# Patient Record
Sex: FEMALE | NOT HISPANIC OR LATINO | URBAN - METROPOLITAN AREA
[De-identification: names, ages, dates, MRNs, and addresses within clinical notes are randomized per-mention and may not be internally consistent; named-entity substitution may affect disease eponyms.]

---

## 2024-11-10 ENCOUNTER — EMERGENCY (EMERGENCY)
Facility: HOSPITAL | Age: 69
LOS: 0 days | Discharge: ANOTHER HOSPITAL | End: 2024-11-10
Attending: STUDENT IN AN ORGANIZED HEALTH CARE EDUCATION/TRAINING PROGRAM
Payer: MEDICARE

## 2024-11-10 ENCOUNTER — INPATIENT (INPATIENT)
Facility: HOSPITAL | Age: 69
LOS: 7 days | Discharge: NURSING, REHAB OR LONG-TERM CARE | DRG: 101 | End: 2024-11-18
Attending: PSYCHIATRY & NEUROLOGY | Admitting: STUDENT IN AN ORGANIZED HEALTH CARE EDUCATION/TRAINING PROGRAM
Payer: SELF-PAY

## 2024-11-10 VITALS
RESPIRATION RATE: 23 BRPM | OXYGEN SATURATION: 100 % | WEIGHT: 205.03 LBS | HEART RATE: 89 BPM | HEIGHT: 66 IN | DIASTOLIC BLOOD PRESSURE: 75 MMHG | SYSTOLIC BLOOD PRESSURE: 142 MMHG

## 2024-11-10 VITALS
SYSTOLIC BLOOD PRESSURE: 124 MMHG | DIASTOLIC BLOOD PRESSURE: 66 MMHG | RESPIRATION RATE: 20 BRPM | OXYGEN SATURATION: 96 % | HEART RATE: 82 BPM

## 2024-11-10 VITALS
OXYGEN SATURATION: 100 % | SYSTOLIC BLOOD PRESSURE: 129 MMHG | HEART RATE: 86 BPM | TEMPERATURE: 99 F | DIASTOLIC BLOOD PRESSURE: 66 MMHG

## 2024-11-10 DIAGNOSIS — R56.9 UNSPECIFIED CONVULSIONS: ICD-10-CM

## 2024-11-10 DIAGNOSIS — D32.9 BENIGN NEOPLASM OF MENINGES, UNSPECIFIED: ICD-10-CM

## 2024-11-10 DIAGNOSIS — E11.9 TYPE 2 DIABETES MELLITUS WITHOUT COMPLICATIONS: ICD-10-CM

## 2024-11-10 LAB
ALBUMIN SERPL ELPH-MCNC: 3.2 G/DL — LOW (ref 3.3–5)
ALBUMIN SERPL ELPH-MCNC: 3.7 G/DL — SIGNIFICANT CHANGE UP (ref 3.3–5)
ALP SERPL-CCNC: 81 U/L — SIGNIFICANT CHANGE UP (ref 40–120)
ALP SERPL-CCNC: 83 U/L — SIGNIFICANT CHANGE UP (ref 40–120)
ALT FLD-CCNC: 32 U/L — SIGNIFICANT CHANGE UP (ref 10–45)
ALT FLD-CCNC: 41 U/L — SIGNIFICANT CHANGE UP (ref 12–78)
AMPHET UR-MCNC: NEGATIVE — SIGNIFICANT CHANGE UP
ANION GAP SERPL CALC-SCNC: 16 MMOL/L — SIGNIFICANT CHANGE UP (ref 5–17)
ANION GAP SERPL CALC-SCNC: 8 MMOL/L — SIGNIFICANT CHANGE UP (ref 5–17)
APAP SERPL-MCNC: <15 UG/ML — SIGNIFICANT CHANGE UP (ref 10–30)
APPEARANCE UR: CLEAR — SIGNIFICANT CHANGE UP
APPEARANCE UR: CLEAR — SIGNIFICANT CHANGE UP
APTT BLD: 23 SEC — LOW (ref 24.5–35.6)
APTT BLD: 32.1 SEC — SIGNIFICANT CHANGE UP (ref 24.5–35.6)
AST SERPL-CCNC: 34 U/L — SIGNIFICANT CHANGE UP (ref 15–37)
AST SERPL-CCNC: 39 U/L — SIGNIFICANT CHANGE UP (ref 10–40)
BACTERIA # UR AUTO: ABNORMAL /HPF
BACTERIA # UR AUTO: ABNORMAL /HPF
BARBITURATES UR SCN-MCNC: NEGATIVE — SIGNIFICANT CHANGE UP
BASE EXCESS BLDA CALC-SCNC: 1 MMOL/L — SIGNIFICANT CHANGE UP (ref -2–3)
BASOPHILS # BLD AUTO: 0.03 K/UL — SIGNIFICANT CHANGE UP (ref 0–0.2)
BASOPHILS # BLD AUTO: 0.04 K/UL — SIGNIFICANT CHANGE UP (ref 0–0.2)
BASOPHILS NFR BLD AUTO: 0.3 % — SIGNIFICANT CHANGE UP (ref 0–2)
BASOPHILS NFR BLD AUTO: 0.3 % — SIGNIFICANT CHANGE UP (ref 0–2)
BENZODIAZ UR-MCNC: POSITIVE
BILIRUB SERPL-MCNC: 0.5 MG/DL — SIGNIFICANT CHANGE UP (ref 0.2–1.2)
BILIRUB SERPL-MCNC: 0.9 MG/DL — SIGNIFICANT CHANGE UP (ref 0.2–1.2)
BILIRUB UR-MCNC: NEGATIVE — SIGNIFICANT CHANGE UP
BILIRUB UR-MCNC: NEGATIVE — SIGNIFICANT CHANGE UP
BLD GP AB SCN SERPL QL: NEGATIVE — SIGNIFICANT CHANGE UP
BLOOD GAS COMMENTS ARTERIAL: SIGNIFICANT CHANGE UP
BUN SERPL-MCNC: 17 MG/DL — SIGNIFICANT CHANGE UP (ref 7–23)
BUN SERPL-MCNC: 19 MG/DL — SIGNIFICANT CHANGE UP (ref 7–23)
CALCIUM SERPL-MCNC: 9 MG/DL — SIGNIFICANT CHANGE UP (ref 8.5–10.1)
CALCIUM SERPL-MCNC: 9.8 MG/DL — SIGNIFICANT CHANGE UP (ref 8.4–10.5)
CAST: 1 /LPF — SIGNIFICANT CHANGE UP (ref 0–4)
CHLORIDE SERPL-SCNC: 101 MMOL/L — SIGNIFICANT CHANGE UP (ref 96–108)
CHLORIDE SERPL-SCNC: 102 MMOL/L — SIGNIFICANT CHANGE UP (ref 96–108)
CO2 BLDA-SCNC: 29 MMOL/L — HIGH (ref 19–24)
CO2 SERPL-SCNC: 21 MMOL/L — LOW (ref 22–31)
CO2 SERPL-SCNC: 26 MMOL/L — SIGNIFICANT CHANGE UP (ref 22–31)
COCAINE METAB.OTHER UR-MCNC: NEGATIVE — SIGNIFICANT CHANGE UP
COLOR SPEC: YELLOW — SIGNIFICANT CHANGE UP
COLOR SPEC: YELLOW — SIGNIFICANT CHANGE UP
CREAT SERPL-MCNC: 0.88 MG/DL — SIGNIFICANT CHANGE UP (ref 0.5–1.3)
CREAT SERPL-MCNC: 1.06 MG/DL — SIGNIFICANT CHANGE UP (ref 0.5–1.3)
DIFF PNL FLD: ABNORMAL
DIFF PNL FLD: ABNORMAL
EGFR: 57 ML/MIN/1.73M2 — LOW
EGFR: 71 ML/MIN/1.73M2 — SIGNIFICANT CHANGE UP
EOSINOPHIL # BLD AUTO: 0 K/UL — SIGNIFICANT CHANGE UP (ref 0–0.5)
EOSINOPHIL # BLD AUTO: 0 K/UL — SIGNIFICANT CHANGE UP (ref 0–0.5)
EOSINOPHIL NFR BLD AUTO: 0 % — SIGNIFICANT CHANGE UP (ref 0–6)
EOSINOPHIL NFR BLD AUTO: 0 % — SIGNIFICANT CHANGE UP (ref 0–6)
EPI CELLS # UR: PRESENT
ETHANOL SERPL-MCNC: <10 MG/DL — SIGNIFICANT CHANGE UP (ref 0–10)
ETHANOL SERPL-MCNC: <10 MG/DL — SIGNIFICANT CHANGE UP (ref 0–10)
GAS PNL BLDA: SIGNIFICANT CHANGE UP
GAS PNL BLDV: SIGNIFICANT CHANGE UP
GLUCOSE BLDC GLUCOMTR-MCNC: 91 MG/DL — SIGNIFICANT CHANGE UP (ref 70–99)
GLUCOSE SERPL-MCNC: 124 MG/DL — HIGH (ref 70–99)
GLUCOSE SERPL-MCNC: 149 MG/DL — HIGH (ref 70–99)
GLUCOSE UR QL: >=1000 MG/DL
GLUCOSE UR QL: >=1000 MG/DL
HCO3 BLDA-SCNC: 27 MMOL/L — SIGNIFICANT CHANGE UP (ref 21–28)
HCT VFR BLD CALC: 36.1 % — SIGNIFICANT CHANGE UP (ref 34.5–45)
HCT VFR BLD CALC: 38.3 % — SIGNIFICANT CHANGE UP (ref 34.5–45)
HGB BLD-MCNC: 11.2 G/DL — LOW (ref 11.5–15.5)
HGB BLD-MCNC: 11.6 G/DL — SIGNIFICANT CHANGE UP (ref 11.5–15.5)
HOROWITZ INDEX BLDA+IHG-RTO: 100 — SIGNIFICANT CHANGE UP
IMM GRANULOCYTES NFR BLD AUTO: 0.3 % — SIGNIFICANT CHANGE UP (ref 0–0.9)
IMM GRANULOCYTES NFR BLD AUTO: 0.6 % — SIGNIFICANT CHANGE UP (ref 0–0.9)
INR BLD: 0.98 RATIO — SIGNIFICANT CHANGE UP (ref 0.85–1.16)
INR BLD: 0.99 RATIO — SIGNIFICANT CHANGE UP (ref 0.85–1.16)
KETONES UR-MCNC: NEGATIVE MG/DL — SIGNIFICANT CHANGE UP
KETONES UR-MCNC: NEGATIVE MG/DL — SIGNIFICANT CHANGE UP
LACTATE SERPL-SCNC: 3.1 MMOL/L — HIGH (ref 0.7–2)
LEUKOCYTE ESTERASE UR-ACNC: NEGATIVE — SIGNIFICANT CHANGE UP
LEUKOCYTE ESTERASE UR-ACNC: NEGATIVE — SIGNIFICANT CHANGE UP
LIDOCAIN IGE QN: 14 U/L — SIGNIFICANT CHANGE UP (ref 7–60)
LYMPHOCYTES # BLD AUTO: 0.59 K/UL — LOW (ref 1–3.3)
LYMPHOCYTES # BLD AUTO: 1.1 K/UL — SIGNIFICANT CHANGE UP (ref 1–3.3)
LYMPHOCYTES # BLD AUTO: 4.9 % — LOW (ref 13–44)
LYMPHOCYTES # BLD AUTO: 9.8 % — LOW (ref 13–44)
MAGNESIUM SERPL-MCNC: 2 MG/DL — SIGNIFICANT CHANGE UP (ref 1.6–2.6)
MCHC RBC-ENTMCNC: 23 PG — LOW (ref 27–34)
MCHC RBC-ENTMCNC: 23.3 PG — LOW (ref 27–34)
MCHC RBC-ENTMCNC: 30.3 G/DL — LOW (ref 32–36)
MCHC RBC-ENTMCNC: 31 G/DL — LOW (ref 32–36)
MCV RBC AUTO: 75.2 FL — LOW (ref 80–100)
MCV RBC AUTO: 75.8 FL — LOW (ref 80–100)
METHADONE UR-MCNC: NEGATIVE — SIGNIFICANT CHANGE UP
MONOCYTES # BLD AUTO: 0.18 K/UL — SIGNIFICANT CHANGE UP (ref 0–0.9)
MONOCYTES # BLD AUTO: 0.57 K/UL — SIGNIFICANT CHANGE UP (ref 0–0.9)
MONOCYTES NFR BLD AUTO: 1.5 % — LOW (ref 2–14)
MONOCYTES NFR BLD AUTO: 5.1 % — SIGNIFICANT CHANGE UP (ref 2–14)
NEUTROPHILS # BLD AUTO: 11.12 K/UL — HIGH (ref 1.8–7.4)
NEUTROPHILS # BLD AUTO: 9.45 K/UL — HIGH (ref 1.8–7.4)
NEUTROPHILS NFR BLD AUTO: 84.5 % — HIGH (ref 43–77)
NEUTROPHILS NFR BLD AUTO: 92.7 % — HIGH (ref 43–77)
NITRITE UR-MCNC: POSITIVE
NITRITE UR-MCNC: POSITIVE
NRBC # BLD: 0 /100 WBCS — SIGNIFICANT CHANGE UP (ref 0–0)
NRBC # BLD: 0 /100 WBCS — SIGNIFICANT CHANGE UP (ref 0–0)
OPIATES UR-MCNC: NEGATIVE — SIGNIFICANT CHANGE UP
OXYCODONE UR-MCNC: NEGATIVE — SIGNIFICANT CHANGE UP
PCO2 BLDA: 48 MMHG — HIGH (ref 32–46)
PCP SPEC-MCNC: SIGNIFICANT CHANGE UP
PCP UR-MCNC: NEGATIVE — SIGNIFICANT CHANGE UP
PH BLDA: 7.36 — SIGNIFICANT CHANGE UP (ref 7.35–7.45)
PH UR: 6 — SIGNIFICANT CHANGE UP (ref 5–8)
PH UR: 6 — SIGNIFICANT CHANGE UP (ref 5–8)
PLATELET # BLD AUTO: 214 K/UL — SIGNIFICANT CHANGE UP (ref 150–400)
PLATELET # BLD AUTO: 316 K/UL — SIGNIFICANT CHANGE UP (ref 150–400)
PO2 BLDA: 300 MMHG — HIGH (ref 83–108)
POTASSIUM SERPL-MCNC: 3.2 MMOL/L — LOW (ref 3.5–5.3)
POTASSIUM SERPL-MCNC: 3.7 MMOL/L — SIGNIFICANT CHANGE UP (ref 3.5–5.3)
POTASSIUM SERPL-SCNC: 3.2 MMOL/L — LOW (ref 3.5–5.3)
POTASSIUM SERPL-SCNC: 3.7 MMOL/L — SIGNIFICANT CHANGE UP (ref 3.5–5.3)
PROT SERPL-MCNC: 7.5 G/DL — SIGNIFICANT CHANGE UP (ref 6–8.3)
PROT SERPL-MCNC: 7.7 GM/DL — SIGNIFICANT CHANGE UP (ref 6–8.3)
PROT UR-MCNC: SIGNIFICANT CHANGE UP MG/DL
PROT UR-MCNC: SIGNIFICANT CHANGE UP MG/DL
PROTHROM AB SERPL-ACNC: 11.1 SEC — SIGNIFICANT CHANGE UP (ref 9.9–13.4)
PROTHROM AB SERPL-ACNC: 11.3 SEC — SIGNIFICANT CHANGE UP (ref 9.9–13.4)
RBC # BLD: 4.8 M/UL — SIGNIFICANT CHANGE UP (ref 3.8–5.2)
RBC # BLD: 5.05 M/UL — SIGNIFICANT CHANGE UP (ref 3.8–5.2)
RBC # FLD: 13.9 % — SIGNIFICANT CHANGE UP (ref 10.3–14.5)
RBC # FLD: 13.9 % — SIGNIFICANT CHANGE UP (ref 10.3–14.5)
RBC CASTS # UR COMP ASSIST: 0 /HPF — SIGNIFICANT CHANGE UP (ref 0–4)
RBC CASTS # UR COMP ASSIST: 3 /HPF — SIGNIFICANT CHANGE UP (ref 0–4)
RH IG SCN BLD-IMP: POSITIVE — SIGNIFICANT CHANGE UP
SALICYLATES SERPL-MCNC: <2 MG/DL — LOW (ref 15–30)
SAO2 % BLDA: 99 % — HIGH (ref 94–98)
SODIUM SERPL-SCNC: 136 MMOL/L — SIGNIFICANT CHANGE UP (ref 135–145)
SODIUM SERPL-SCNC: 138 MMOL/L — SIGNIFICANT CHANGE UP (ref 135–145)
SP GR SPEC: 1.02 — SIGNIFICANT CHANGE UP (ref 1–1.03)
SP GR SPEC: 1.02 — SIGNIFICANT CHANGE UP (ref 1–1.03)
SQUAMOUS # UR AUTO: 1 /HPF — SIGNIFICANT CHANGE UP (ref 0–5)
THC UR QL: NEGATIVE — SIGNIFICANT CHANGE UP
TROPONIN I, HIGH SENSITIVITY RESULT: 37.8 NG/L — SIGNIFICANT CHANGE UP
UROBILINOGEN FLD QL: 0.2 MG/DL — SIGNIFICANT CHANGE UP (ref 0.2–1)
UROBILINOGEN FLD QL: 0.2 MG/DL — SIGNIFICANT CHANGE UP (ref 0.2–1)
WBC # BLD: 11.18 K/UL — HIGH (ref 3.8–10.5)
WBC # BLD: 12 K/UL — HIGH (ref 3.8–10.5)
WBC # FLD AUTO: 11.18 K/UL — HIGH (ref 3.8–10.5)
WBC # FLD AUTO: 12 K/UL — HIGH (ref 3.8–10.5)
WBC UR QL: 1 /HPF — SIGNIFICANT CHANGE UP (ref 0–5)
WBC UR QL: 3 /HPF — SIGNIFICANT CHANGE UP (ref 0–5)

## 2024-11-10 PROCEDURE — 70450 CT HEAD/BRAIN W/O DYE: CPT | Mod: 26,MC

## 2024-11-10 PROCEDURE — 71045 X-RAY EXAM CHEST 1 VIEW: CPT | Mod: 26

## 2024-11-10 PROCEDURE — 71045 X-RAY EXAM CHEST 1 VIEW: CPT | Mod: 26,77,76

## 2024-11-10 PROCEDURE — 99291 CRITICAL CARE FIRST HOUR: CPT | Mod: 25

## 2024-11-10 PROCEDURE — 43752 NASAL/OROGASTRIC W/TUBE PLMT: CPT

## 2024-11-10 PROCEDURE — 95720 EEG PHY/QHP EA INCR W/VEEG: CPT

## 2024-11-10 PROCEDURE — 99291 CRITICAL CARE FIRST HOUR: CPT

## 2024-11-10 PROCEDURE — 99292 CRITICAL CARE ADDL 30 MIN: CPT | Mod: 25

## 2024-11-10 PROCEDURE — 31500 INSERT EMERGENCY AIRWAY: CPT

## 2024-11-10 PROCEDURE — 71045 X-RAY EXAM CHEST 1 VIEW: CPT | Mod: 26,77

## 2024-11-10 RX ORDER — PROPOFOL 10 MG/ML
10 INJECTION, EMULSION INTRAVENOUS
Qty: 1000 | Refills: 0 | Status: DISCONTINUED | OUTPATIENT
Start: 2024-11-10 | End: 2024-11-10

## 2024-11-10 RX ORDER — FENTANYL 12 UG/H
0.5 PATCH, EXTENDED RELEASE TRANSDERMAL
Qty: 2500 | Refills: 0 | Status: DISCONTINUED | OUTPATIENT
Start: 2024-11-10 | End: 2024-11-10

## 2024-11-10 RX ORDER — LORAZEPAM 2 MG/1
2 TABLET ORAL ONCE
Refills: 0 | Status: DISCONTINUED | OUTPATIENT
Start: 2024-11-10 | End: 2024-11-10

## 2024-11-10 RX ORDER — FENTANYL 12 UG/H
25 PATCH, EXTENDED RELEASE TRANSDERMAL
Refills: 0 | Status: DISCONTINUED | OUTPATIENT
Start: 2024-11-10 | End: 2024-11-12

## 2024-11-10 RX ORDER — LEVETIRACETAM 500 MG/1
1000 TABLET, FILM COATED ORAL ONCE
Refills: 0 | Status: COMPLETED | OUTPATIENT
Start: 2024-11-10 | End: 2024-11-10

## 2024-11-10 RX ORDER — CHLORHEXIDINE GLUCONATE 1.2 MG/ML
1 RINSE ORAL DAILY
Refills: 0 | Status: DISCONTINUED | OUTPATIENT
Start: 2024-11-10 | End: 2024-11-11

## 2024-11-10 RX ORDER — FENTANYL 12 UG/H
0.5 PATCH, EXTENDED RELEASE TRANSDERMAL
Qty: 5000 | Refills: 0 | Status: DISCONTINUED | OUTPATIENT
Start: 2024-11-10 | End: 2024-11-10

## 2024-11-10 RX ORDER — CHLORHEXIDINE GLUCONATE 40 MG/ML
15 SOLUTION TOPICAL EVERY 12 HOURS
Refills: 0 | Status: DISCONTINUED | OUTPATIENT
Start: 2024-11-10 | End: 2024-11-10

## 2024-11-10 RX ORDER — LEVETIRACETAM 500 MG/1
3000 TABLET, FILM COATED ORAL ONCE
Refills: 0 | Status: DISCONTINUED | OUTPATIENT
Start: 2024-11-10 | End: 2024-11-10

## 2024-11-10 RX ORDER — LEVETIRACETAM 500 MG/1
1500 TABLET, FILM COATED ORAL ONCE
Refills: 0 | Status: DISCONTINUED | OUTPATIENT
Start: 2024-11-10 | End: 2024-11-10

## 2024-11-10 RX ORDER — PROPOFOL 10 MG/ML
50 INJECTION, EMULSION INTRAVENOUS
Qty: 1000 | Refills: 0 | Status: DISCONTINUED | OUTPATIENT
Start: 2024-11-10 | End: 2024-11-10

## 2024-11-10 RX ORDER — FENTANYL CITRAT/DEXTROSE 5%/PF 1250MCG/50
0.5 PATIENT CONTROLLED ANALGESIA SYRINGE INTRAVENOUS
Qty: 2500 | Refills: 0 | Status: DISCONTINUED | OUTPATIENT
Start: 2024-11-10 | End: 2024-11-10

## 2024-11-10 RX ORDER — POTASSIUM CHLORIDE 600 MG/1
20 TABLET, EXTENDED RELEASE ORAL
Refills: 0 | Status: COMPLETED | OUTPATIENT
Start: 2024-11-10 | End: 2024-11-11

## 2024-11-10 RX ORDER — LEVETIRACETAM 500 MG/1
1500 TABLET, FILM COATED ORAL ONCE
Refills: 0 | Status: COMPLETED | OUTPATIENT
Start: 2024-11-10 | End: 2024-11-10

## 2024-11-10 RX ORDER — LACOSAMIDE 10 MG/ML
400 INJECTION INTRAVENOUS ONCE
Refills: 0 | Status: DISCONTINUED | OUTPATIENT
Start: 2024-11-10 | End: 2024-11-10

## 2024-11-10 RX ORDER — OXYCODONE HYDROCHLORIDE 30 MG/1
10 TABLET ORAL EVERY 6 HOURS
Refills: 0 | Status: DISCONTINUED | OUTPATIENT
Start: 2024-11-10 | End: 2024-11-12

## 2024-11-10 RX ORDER — POTASSIUM CHLORIDE 10 MEQ
10 TABLET, EXTENDED RELEASE ORAL ONCE
Refills: 0 | Status: DISCONTINUED | OUTPATIENT
Start: 2024-11-10 | End: 2024-11-10

## 2024-11-10 RX ORDER — LORAZEPAM 2 MG
2 TABLET ORAL ONCE
Refills: 0 | Status: DISCONTINUED | OUTPATIENT
Start: 2024-11-10 | End: 2024-11-10

## 2024-11-10 RX ORDER — ETOMIDATE 2 MG/ML
20 INJECTION, SOLUTION INTRAVENOUS ONCE
Refills: 0 | Status: COMPLETED | OUTPATIENT
Start: 2024-11-10 | End: 2024-11-10

## 2024-11-10 RX ORDER — 0.9 % SODIUM CHLORIDE 0.9 %
1000 INTRAVENOUS SOLUTION INTRAVENOUS
Refills: 0 | Status: DISCONTINUED | OUTPATIENT
Start: 2024-11-10 | End: 2024-11-10

## 2024-11-10 RX ORDER — SODIUM CHLORIDE 9 MG/ML
1000 INJECTION, SOLUTION INTRAMUSCULAR; INTRAVENOUS; SUBCUTANEOUS
Refills: 0 | Status: DISCONTINUED | OUTPATIENT
Start: 2024-11-10 | End: 2024-11-11

## 2024-11-10 RX ORDER — SENNOSIDES 8.6 MG
2 TABLET ORAL AT BEDTIME
Refills: 0 | Status: DISCONTINUED | OUTPATIENT
Start: 2024-11-10 | End: 2024-11-18

## 2024-11-10 RX ORDER — CEFTRIAXONE SODIUM 1 G
1000 VIAL (EA) INJECTION EVERY 24 HOURS
Refills: 0 | Status: COMPLETED | OUTPATIENT
Start: 2024-11-10 | End: 2024-11-12

## 2024-11-10 RX ORDER — POLYETHYLENE GLYCOL 3350 17 G/17G
17 POWDER, FOR SOLUTION ORAL
Refills: 0 | Status: DISCONTINUED | OUTPATIENT
Start: 2024-11-10 | End: 2024-11-18

## 2024-11-10 RX ORDER — CEFTRIAXONE SODIUM 1 G
1000 VIAL (EA) INJECTION ONCE
Refills: 0 | Status: COMPLETED | OUTPATIENT
Start: 2024-11-10 | End: 2024-11-10

## 2024-11-10 RX ORDER — GLUCAGON INJECTION, SOLUTION 0.5 MG/.1ML
1 INJECTION, SOLUTION SUBCUTANEOUS ONCE
Refills: 0 | Status: DISCONTINUED | OUTPATIENT
Start: 2024-11-10 | End: 2024-11-10

## 2024-11-10 RX ORDER — OXYCODONE HYDROCHLORIDE 30 MG/1
5 TABLET ORAL EVERY 6 HOURS
Refills: 0 | Status: DISCONTINUED | OUTPATIENT
Start: 2024-11-10 | End: 2024-11-12

## 2024-11-10 RX ORDER — LACOSAMIDE 10 MG/ML
200 INJECTION INTRAVENOUS EVERY 12 HOURS
Refills: 0 | Status: DISCONTINUED | OUTPATIENT
Start: 2024-11-10 | End: 2024-11-11

## 2024-11-10 RX ORDER — PANTOPRAZOLE SODIUM 40 MG/1
40 TABLET, DELAYED RELEASE ORAL DAILY
Refills: 0 | Status: DISCONTINUED | OUTPATIENT
Start: 2024-11-10 | End: 2024-11-18

## 2024-11-10 RX ORDER — DEXMEDETOMIDINE HYDROCHLORIDE 4 UG/ML
0.2 INJECTION, SOLUTION INTRAVENOUS
Qty: 200 | Refills: 0 | Status: DISCONTINUED | OUTPATIENT
Start: 2024-11-10 | End: 2024-11-11

## 2024-11-10 RX ORDER — ACETAMINOPHEN 500MG 500 MG/1
650 TABLET, COATED ORAL EVERY 6 HOURS
Refills: 0 | Status: DISCONTINUED | OUTPATIENT
Start: 2024-11-10 | End: 2024-11-18

## 2024-11-10 RX ORDER — ONDANSETRON HYDROCHLORIDE 4 MG/1
4 TABLET, FILM COATED ORAL EVERY 6 HOURS
Refills: 0 | Status: DISCONTINUED | OUTPATIENT
Start: 2024-11-10 | End: 2024-11-18

## 2024-11-10 RX ORDER — SUCCINYLCHOLINE CHLORIDE 20 MG/ML
100 INJECTION INTRAMUSCULAR; INTRAVENOUS ONCE
Refills: 0 | Status: COMPLETED | OUTPATIENT
Start: 2024-11-10 | End: 2024-11-10

## 2024-11-10 RX ORDER — CHLORHEXIDINE GLUCONATE 1.2 MG/ML
15 RINSE ORAL EVERY 12 HOURS
Refills: 0 | Status: DISCONTINUED | OUTPATIENT
Start: 2024-11-10 | End: 2024-11-11

## 2024-11-10 RX ORDER — HEPARIN SODIUM,PORCINE 1000/ML
5000 VIAL (ML) INJECTION EVERY 8 HOURS
Refills: 0 | Status: DISCONTINUED | OUTPATIENT
Start: 2024-11-10 | End: 2024-11-16

## 2024-11-10 RX ORDER — CEFTRIAXONE SODIUM 10 G
1000 VIAL (EA) INJECTION ONCE
Refills: 0 | Status: DISCONTINUED | OUTPATIENT
Start: 2024-11-10 | End: 2024-11-10

## 2024-11-10 RX ADMIN — PROPOFOL 5.58 MICROGRAM(S)/KG/MIN: 10 INJECTION, EMULSION INTRAVENOUS at 10:48

## 2024-11-10 RX ADMIN — SODIUM CHLORIDE 75 MILLILITER(S): 9 INJECTION, SOLUTION INTRAMUSCULAR; INTRAVENOUS; SUBCUTANEOUS at 16:16

## 2024-11-10 RX ADMIN — CHLORHEXIDINE GLUCONATE 1 APPLICATION(S): 1.2 RINSE ORAL at 17:30

## 2024-11-10 RX ADMIN — POTASSIUM CHLORIDE 20 MILLIEQUIVALENT(S): 600 TABLET, EXTENDED RELEASE ORAL at 22:17

## 2024-11-10 RX ADMIN — PROPOFOL 21 MICROGRAM(S)/KG/MIN: 10 INJECTION, EMULSION INTRAVENOUS at 14:29

## 2024-11-10 RX ADMIN — SUCCINYLCHOLINE CHLORIDE 100 MILLIGRAM(S): 20 INJECTION INTRAMUSCULAR; INTRAVENOUS at 10:47

## 2024-11-10 RX ADMIN — PANTOPRAZOLE SODIUM 40 MILLIGRAM(S): 40 TABLET, DELAYED RELEASE ORAL at 17:24

## 2024-11-10 RX ADMIN — Medication 2 TABLET(S): at 22:19

## 2024-11-10 RX ADMIN — PROPOFOL 21 MICROGRAM(S)/KG/MIN: 10 INJECTION, EMULSION INTRAVENOUS at 16:17

## 2024-11-10 RX ADMIN — SODIUM CHLORIDE 75 MILLILITER(S): 9 INJECTION, SOLUTION INTRAMUSCULAR; INTRAVENOUS; SUBCUTANEOUS at 19:09

## 2024-11-10 RX ADMIN — FENTANYL 1.75 MICROGRAM(S)/KG/HR: 12 PATCH, EXTENDED RELEASE TRANSDERMAL at 13:24

## 2024-11-10 RX ADMIN — Medication 100 MILLIGRAM(S): at 13:52

## 2024-11-10 RX ADMIN — LEVETIRACETAM 400 MILLIGRAM(S): 500 TABLET, FILM COATED ORAL at 12:09

## 2024-11-10 RX ADMIN — LEVETIRACETAM 400 MILLIGRAM(S): 500 TABLET, FILM COATED ORAL at 09:58

## 2024-11-10 RX ADMIN — POLYETHYLENE GLYCOL 3350 17 GRAM(S): 17 POWDER, FOR SOLUTION ORAL at 23:06

## 2024-11-10 RX ADMIN — LACOSAMIDE 140 MILLIGRAM(S): 10 INJECTION INTRAVENOUS at 17:31

## 2024-11-10 RX ADMIN — CHLORHEXIDINE GLUCONATE 15 MILLILITER(S): 1.2 RINSE ORAL at 17:27

## 2024-11-10 RX ADMIN — Medication 5000 UNIT(S): at 22:19

## 2024-11-10 RX ADMIN — LACOSAMIDE 180 MILLIGRAM(S): 10 INJECTION INTRAVENOUS at 14:58

## 2024-11-10 RX ADMIN — FENTANYL 3.5 MICROGRAM(S)/KG/HR: 12 PATCH, EXTENDED RELEASE TRANSDERMAL at 14:23

## 2024-11-10 RX ADMIN — DEXMEDETOMIDINE HYDROCHLORIDE 3.5 MICROGRAM(S)/KG/HR: 4 INJECTION, SOLUTION INTRAVENOUS at 19:09

## 2024-11-10 RX ADMIN — ETOMIDATE 20 MILLIGRAM(S): 2 INJECTION, SOLUTION INTRAVENOUS at 10:46

## 2024-11-10 RX ADMIN — Medication 2 MILLIGRAM(S): at 09:55

## 2024-11-10 RX ADMIN — Medication 4.65 MICROGRAM(S)/KG/HR: at 11:45

## 2024-11-10 RX ADMIN — Medication 100 MILLIGRAM(S): at 20:56

## 2024-11-10 RX ADMIN — PROPOFOL 21 MICROGRAM(S)/KG/MIN: 10 INJECTION, EMULSION INTRAVENOUS at 13:21

## 2024-11-10 NOTE — ED ADULT NURSE NOTE - CHIEF COMPLAINT QUOTE
transfer from [facility] for neurosurg pt. had multiple seizures intubated at OSH dx with meningioma

## 2024-11-10 NOTE — ED PROVIDER NOTE - CLINICAL SUMMARY MEDICAL DECISION MAKING FREE TEXT BOX
68 y/o F with PMH DM, glaucoma presenting to the ED w/ seizure.  Vitals stable.  Patient is unresponsive with some seizure activity in the ED (tonic movement of arms and eye deviation).  Patient given ativan IVP, received 15mg versed in the field  Intubated for airway protection; will place on propofol and fentanyl for sedation  Loaded 1g keppra  Will obtain labs and CTH to evaluate for ICH or other acute pathology    Labs reviewed, mild leukocytosis  CT w/ meningioma, may be cause of seizure  Discussed with Neuro ICU, will transfer to [facility] for neurosurgery eval and likely neuro icu admission

## 2024-11-10 NOTE — ED PROVIDER NOTE - PROGRESS NOTE DETAILS
Attending MD Alfonso: Discussed with patient's son at bedside.  He states that he found her on the ground with shaking activity of the entire body.  He estimates that she had continuous shaking activity for about 30 minutes and finally it seemed to young after the paramedics got her to the hospital.  He denies any history of seizure in his mother.  She was not ill with anything yesterday.  She does not drink alcohol.

## 2024-11-10 NOTE — ED ADULT NURSE NOTE - OBJECTIVE STATEMENT
69 yr old female with a h/o high bp and diabetes but normally a and o x 4 fabiola miller was found on the floor by her son having seizures. was taken to [place] and was found to have a mengioma. transferred to us for neuro surg. pt cam intubated from other hospital. unable to assess neuro, skin intact no fevers drake placed at [place]

## 2024-11-10 NOTE — ED ADULT NURSE NOTE - CHIEF COMPLAINT QUOTE
BIBEMS per son he heard pt moaning around 9AM and saw her on the ground in hallway. Per EMS, pt seizing x7 times. EMS placed LAC #20 and gave 5mg Versed IM and 10mg IV push. On NRB 95%. PMH DM, Glaucoma.

## 2024-11-10 NOTE — H&P ADULT - ASSESSMENT
ASSESSMENT/PLAN: 69F presented with Seizure activity     NEURO:  Neuro checks q1/vitals q1  11/10  OSH CTH: 1.0 cm partially calcified mass along the anterior skull base which may represent a meningioma. Mild mass effect on inferior medial frontal lobe  MRI w/wout pending  Vimpat 400mg load, continue Vimpat 200 mg BID  vEEG in progress  Sedation: Propofol  Pain: tylenol and oxycodone PRN   Activity: [] OOB as tolerated [x] Bedrest [] PT [] OT [] PMNR      PULM:  Intubated, SpO2 >92%  16/400/40/5    CV:  -160mmHg  TTE-p    RENAL:  IVF until good PO intake    GI:  Diet: NGT- NPO  senna and miralax for bowel regimen  Last Bowel Movement: PTA  GI prophylaxis [] not indicated [x] PPI [] other:      ENDO:   Goal euglycemia (-180)  ISS  a1c pending    HEME/ONC:  VTE prophylaxis: [x] SCDs [] chemoprophylaxis [] hold chemoprophylaxis due to: [] high risk of DVT/PE on admission due to:  LED-p    ID:  Afebrile    MISC:    SOCIAL/FAMILY:  [x] awaiting [] updated at bedside [] family meeting    CODE STATUS:  [x] Full Code [] DNR [] DNI [] Palliative/Comfort Care    DISPOSITION:  [x] ICU [] Stroke Unit [] Floor [] EMU [] RCU [] PCU    [x] Patient is at high risk of neurologic deterioration/death due to:       Contact: [phone] ASSESSMENT/PLAN: 69F presented with Seizure activity     NEURO:  Neuro checks q1/vitals q1  11/10  OSH CTH: 1.0 cm partially calcified mass along the anterior skull base which may represent a meningioma. Mild mass effect on inferior medial frontal lobe  MRI w/wout pending  Vimpat 400mg load, continue Vimpat 200 mg BID  vEEG in progress  Sedation: Propofol  Pain: tylenol and oxycodone PRN   Activity: [] OOB as tolerated [x] Bedrest [] PT [] OT [] PMNR      PULM:  Intubated, SpO2 >92%  16/400/40/5    CV:  -160mmHg  TTE-p    RENAL:  IVF until good PO intake    GI:  Diet: NGT- NPO  senna and miralax for bowel regimen  Last Bowel Movement: PTA  GI prophylaxis [] not indicated [x] PPI [] other:      ENDO:   Goal euglycemia (-180)  ISS  a1c pending    HEME/ONC:  VTE prophylaxis: [x] SCDs [] chemoprophylaxis [] hold chemoprophylaxis due to: [] high risk of DVT/PE on admission due to:  LED-p    ID:  11/10 at [facility] VS UA (+)  started on Ceftriaxone for 3 days    MISC:    SOCIAL/FAMILY:  [x] awaiting [] updated at bedside [] family meeting    CODE STATUS:  [x] Full Code [] DNR [] DNI [] Palliative/Comfort Care    DISPOSITION:  [x] ICU [] Stroke Unit [] Floor [] EMU [] RCU [] PCU    [x] Patient is at high risk of neurologic deterioration/death due to:       Contact: [phone] ASSESSMENT/PLAN: 69F presented with Seizure activity     NEURO:  Neuro checks q1/vitals q1  11/10  OSH CTH: 1.0 cm partially calcified mass along the anterior skull base which may represent a meningioma. Mild mass effect on inferior medial frontal lobe  MRI w/wout pending  Vimpat 400mg load, continue Vimpat 200 mg BID  vEEG in progress  Sedation: Propofol  Pain: tylenol and oxycodone PRN   Activity: [] OOB as tolerated [x] Bedrest [] PT [] OT [] PMNR      PULM:  Intubated, SpO2 >92%  16/400/30/5    CV:  -160mmHg  TTE-p    RENAL:  IVF until good PO intake    GI:  Diet: NGT- NPO  senna and miralax for bowel regimen  Last Bowel Movement: PTA  GI prophylaxis [] not indicated [x] PPI [] other:      ENDO:   Goal euglycemia (-180)  ISS  a1c pending    HEME/ONC:  VTE prophylaxis: [x] SCDs [x] SQH BID  LED-p    ID:  11/10 at [facility] VS UA (+)  started on Ceftriaxone for 3 days till 11/13  Urine cultures pending    MISC:    SOCIAL/FAMILY:  [x] awaiting [] updated at bedside [] family meeting    CODE STATUS:  [x] Full Code [] DNR [] DNI [] Palliative/Comfort Care    DISPOSITION:  [x] ICU [] Stroke Unit [] Floor [] EMU [] RCU [] PCU    [x] Patient is at high risk of neurologic deterioration/death due to:       Contact: [phone]

## 2024-11-10 NOTE — CONSULT NOTE ADULT - ASSESSMENT
69F PMHx HTN, DM, glaucoma presents as transfer from [facility] due to seizures. Had 30 minutes of consecutive seizure activity, broke after Versed. Received Keppra 2500 mg total, intubated and sedated at time of arrival. Loaded with Vimpat 400 mg in NSCU. On exam, patient is intubated s/p propofol, currently on sedation with precedex. Not arousable, does not follow commands, left gaze preference.   CT head remarkable for 1 cm lesion with mass effect. UA positive for UTI.    Impression: New onset seizures (generalized vs focal to bilateral tonic clonic) with status epilepticus.    Recommendations:  [] Keep off propofol  [] Agree with Vimpat 200 mg BID  [] Continue vEEG monitoring  [] Infectious/metabolic workup as per primary team    Discussed with attending Dr. López. Case and plan not finalized until attending attestation 69F PMHx HTN, DM, glaucoma presents as transfer from [facility] due to seizures. Had 30 minutes of consecutive seizure activity, broke after Versed. Received Keppra 2500 mg total, intubated and sedated at time of arrival. Loaded with Vimpat 400 mg in NSCU. On exam, patient is intubated s/p propofol, currently on sedation with precedex. Not arousable, does not follow commands, left gaze preference.   CT head remarkable for 1 cm lesion with mass effect. UA positive for UTI.    Impression: New onset seizures (generalized vs focal to bilateral tonic clonic) with status epilepticus.    Recommendations:  [] Keep off propofol  [] Agree with Vimpat 200 mg BID  [] Continue vEEG monitoring  [] MRI brain w/ and w/o contrast Epilepsy protocol  [] Infectious/metabolic workup as per primary team  [] Vital signs and neurological checks q2h  [] Seizure, fall and aspiration precautions. Avoid sleep deprivation.   [] Please note that numerous antibiotics may trigger epileptic seizures or status epilepticus by decreasing inhibitory transmission in the brain, thus lowering the seizure threshold. The most potent epileptogenic effect is exerted by penicillins, cephalosporins (fourth generation), fluoroquinolones, and carbapenems.    [] Given concern for seizure, advise the patient with regards to risks and driving privileges associated with the [place] Guidelines - driving is restricted until 12 months (less at discretion of DMV) after the last seizure with impaired awareness. Advise patient regarding the risk of seizures and general seizure safety recommendations including not to be bathing alone, showering with standing water > 2 inches, swimming unsupervised, climbing to high places, or operating heavy machinery until cleared by follow-up outpatient Neurology. Reinforce the importance of compliance with medications. Discuss sleep hygiene and the risks of sleep disruption. Discuss the risk of death associated with seizures / SUDEP.     [] Please note: if patient has a convulsion, please document length of episode, specifically what patient was doing paying attention to eye opening vs closure, gaze deviation, shaking of extremities, tongue bite, urinary incontinence, any derangement of vital signs.      Discussed with attending Dr. López. Case and plan not finalized until attending attestation 69F PMHx HTN, DM, glaucoma presents as transfer from [facility] due to seizures. Had 30 minutes of consecutive seizure activity, broke after Versed. Received Keppra 2500 mg total, intubated and sedated at time of arrival. Loaded with Vimpat 400 mg in NSCU. On exam, patient is intubated s/p propofol, currently on sedation with precedex. Not arousable, does not follow commands, left gaze preference.   CT head remarkable for 1 cm lesion with mass effect. UA positive for UTI.    Impression: New onset seizures (generalized vs focal to bilateral tonic clonic) with status epilepticus.    Recommendations:  [] Keep off propofol  [] Agree with Vimpat 200 mg BID  [] Rescue medications: 1st line- Lorazepam 1 mg IV push for seizures lasting >3 minutes with vital sign changes. 2nd line- Briviact 100 mg IV push for seizures lasting >3 minutes refractory to lorazepam.  [] Continue vEEG monitoring  [] MRI brain w/ and w/o contrast Epilepsy protocol  [] Infectious/metabolic workup as per primary team  [] Vital signs and neurological checks q2h  [] Seizure, fall and aspiration precautions. Avoid sleep deprivation.   [] Please note that numerous antibiotics may trigger epileptic seizures or status epilepticus by decreasing inhibitory transmission in the brain, thus lowering the seizure threshold. The most potent epileptogenic effect is exerted by penicillins, cephalosporins (fourth generation), fluoroquinolones, and carbapenems.    [] Given concern for seizure, advise the patient with regards to risks and driving privileges associated with the [place] Guidelines - driving is restricted until 12 months (less at discretion of DMV) after the last seizure with impaired awareness. Advise patient regarding the risk of seizures and general seizure safety recommendations including not to be bathing alone, showering with standing water > 2 inches, swimming unsupervised, climbing to high places, or operating heavy machinery until cleared by follow-up outpatient Neurology. Reinforce the importance of compliance with medications. Discuss sleep hygiene and the risks of sleep disruption. Discuss the risk of death associated with seizures / SUDEP.     [] Please note: if patient has a convulsion, please document length of episode, specifically what patient was doing paying attention to eye opening vs closure, gaze deviation, shaking of extremities, tongue bite, urinary incontinence, any derangement of vital signs.      Discussed with attending Dr. López. Case and plan not finalized until attending attestation 69F PMHx HTN, DM, glaucoma presents as transfer from [facility] due to seizures. Had 30 minutes of consecutive seizure activity, broke after Versed. Received Keppra 2500 mg total, intubated and sedated at time of arrival. Loaded with Vimpat 400 mg in NSCU. On exam, patient is intubated s/p propofol, currently on sedation with precedex. Not arousable, does not follow commands, left gaze preference.   CT head remarkable for 1 cm lesion with mass effect. UA positive for UTI.    Impression: New onset seizures (generalized vs focal to bilateral tonic clonic) with status epilepticus.    Recommendations:  [] Keep off propofol  [] Agree with Vimpat 200 mg BID  [] Rescue medications: 1st line- Lorazepam 1 mg IV push for seizures lasting >3 minutes with vital sign changes. 2nd line- Briviact 100 mg IV push for seizures lasting >3 minutes refractory to lorazepam.  [] Continue vEEG monitoring  [] MRI brain w/ and w/o contrast Epilepsy protocol  [] Infectious/metabolic workup as per primary team  [] Follow up NSGY recommendations for brain mass  [] Vital signs and neurological checks q2h  [] Seizure, fall and aspiration precautions. Avoid sleep deprivation.   [] Please note that numerous antibiotics may trigger epileptic seizures or status epilepticus by decreasing inhibitory transmission in the brain, thus lowering the seizure threshold. The most potent epileptogenic effect is exerted by penicillins, cephalosporins (fourth generation), fluoroquinolones, and carbapenems.    [] Given concern for seizure, advise the patient with regards to risks and driving privileges associated with the [place] Guidelines - driving is restricted until 12 months (less at discretion of DMV) after the last seizure with impaired awareness. Advise patient regarding the risk of seizures and general seizure safety recommendations including not to be bathing alone, showering with standing water > 2 inches, swimming unsupervised, climbing to high places, or operating heavy machinery until cleared by follow-up outpatient Neurology. Reinforce the importance of compliance with medications. Discuss sleep hygiene and the risks of sleep disruption. Discuss the risk of death associated with seizures / SUDEP.     [] Please note: if patient has a convulsion, please document length of episode, specifically what patient was doing paying attention to eye opening vs closure, gaze deviation, shaking of extremities, tongue bite, urinary incontinence, any derangement of vital signs.      Discussed with attending Dr. López. Case and plan not finalized until attending attestation

## 2024-11-10 NOTE — ED ADULT NURSE REASSESSMENT NOTE - NS ED NURSE REASSESS COMMENT FT1
Late entry 11:24am: Patient observed biting down on tube and extremely restless, Dr. Benites made aware. Fentanyl to be ordered. 3rd IV line initiated.

## 2024-11-10 NOTE — H&P ADULT - NSHPLABSRESULTS_GEN_ALL_CORE
Respiratory:  Mode: AC/ CMV (Assist Control/ Continuous Mandatory Ventilation)  RR (machine): 16  TV (machine): 400  FiO2: 40  PEEP: 5  ITime: 1  MAP: 7    CTof the Head at [facility] VS: 11/10  1.0 cm partially calcified mass along the anterior skull base which may represent a meningioma. Mild mass effect on inferior medial frontal lobe Respiratory:  Mode: AC/ CMV (Assist Control/ Continuous Mandatory Ventilation)  RR (machine): 16  TV (machine): 400  FiO2: 30  PEEP: 5  ITime: 1  MAP: 7    CTof the Head at [facility] VS: 11/10  1.0 cm partially calcified mass along the anterior skull base which may represent a meningioma. Mild mass effect on inferior medial frontal lobe

## 2024-11-10 NOTE — H&P ADULT - NSHPREVIEWOFSYSTEMS_GEN_ALL_CORE
REVIEW OF SYSTEMS: [ ] Unable to Assess due to neurologic exam   [ ] All ROS addressed below are non-contributory, except:  Neuro: [ ] Headache [ ] Back pain [ ] Numbness [ ] Weakness [ ] Ataxia [ ] Dizziness [ ] Aphasia [ ] Dysarthria [ ] Visual disturbance  Resp: [ ] Shortness of breath/dyspnea, [ ] Orthopnea [ ] Cough  CV: [ ] Chest pain [ ] Palpitation [ ] Lightheadedness [ ] Syncope  Renal: [ ] Thirst [ ] Edema  GI: [ ] Nausea [ ] Emesis [ ] Abdominal pain [ ] Constipation [ ] Diarrhea  Hem: [ ] Hematemesis [ ] bright red blood per rectum  ID: [ ] Fever [ ] Chills [ ] Dysuria  ENT: [ ] Rhinorrhea

## 2024-11-10 NOTE — H&P ADULT - HISTORY OF PRESENT ILLNESS
69F with PMH diabetes and glaucoma. Presented to [facility] 11/10 after son found her on the ground this morning presumed seizure-like activity. Patient received a total of 15 mg of Versed during transport from first ED, Patient was intubated in the ED, started on propofol and fentanyl infusions.  Patient received a total of 2500 of IV Keppra at outside hospital.  Patient had a CT of the head that showed "1 cm partially calcified mass along the anterior skull base which may represent a meningioma.  There is mild mass effect on the inferior medial left frontal lobe.  No acute intracranial hemorrhage or midline shift."  Remainder of history is unobtainable as patient is intubated and sedated. Patient transferred to [facility] for further interventions.    Admission Scores  GCS:  Sedation on                               69F with PMH diabetes and glaucoma. Presented to [facility] 11/10 after son found her on the ground this morning presumed seizure-like activity. Patient received a total of 15 mg of Versed during transport from first ED, Patient was intubated in the ED, started on propofol and fentanyl infusions.  Patient received a total of 2500 of IV Keppra at outside hospital.  Patient had a CT of the head that showed "1 cm partially calcified mass along the anterior skull base which may represent a meningioma.  There is mild mass effect on the inferior medial left frontal lobe.  No acute intracranial hemorrhage or midline shift."  Remainder of history is unobtainable as patient is intubated and sedated. Patient transferred to [facility] for further interventions.    Admission Scores  GCS:  Sedation on 69F with PMH diabetes and glaucoma. Presented to [facility] 11/10 after son found her on the ground this morning presumed seizure-like activity. Patient received a total of 15 mg of Versed during transport from first ED, Patient was intubated in the ED, started on propofol and fentanyl infusions.  Patient received a total of 2500 of IV Keppra at outside hospital.  Patient had a CT of the head that showed "1 cm partially calcified mass along the anterior skull base which may represent a meningioma.  There is mild mass effect on the inferior medial left frontal lobe.  No acute intracranial hemorrhage or midline shift."  Remainder of history is unobtainable as patient is intubated and sedated. Patient transferred to [facility] for further interventions.    Admission Scores  GCS:  Sedation

## 2024-11-10 NOTE — ED ADULT TRIAGE NOTE - CHIEF COMPLAINT QUOTE
transfer from [facility] for neurosurg pt. had multiple seizures intubated at OSH dx with meningioma

## 2024-11-10 NOTE — ED ADULT NURSE NOTE - OBJECTIVE STATEMENT
69 year old female BIBA presents to ED postictal after several witnessed seizures. As per family patient had a recent fall and no history of seizures. EMS administered 15mg of Versed. Patient intubated 69 year old female BIBA hx DM, Glaucoma presents to ED postictal after several seizures witnessed by family and EMS. As per family patient had a recent fall and no history of seizures. EMS administered 15mg of Versed. Patient intubated 7.5 and 23 at the lip at 10:06 and given keppra. Land catheter in 14F. CT shows mangioma to be transferred to high level of care.

## 2024-11-10 NOTE — H&P ADULT - BIRTH SEX
Generated in [employer] and routed to Dr Rodriguez for review and signature.  Original placed in MA Done folder on [employer].    Christiana Ellis CMA(AAMA)     Female

## 2024-11-10 NOTE — H&P ADULT - NSHPPHYSICALEXAM_GEN_ALL_CORE
DEVICES:   [ x] Restraints [x ] ET tube [ x] NGT/OGT     EXAMINATION:  PHYSICAL EXAM:    Constitutional: No Acute Distress     Neurological:                                                  Pulmonary: Clear to Auscultation, No rales, No rhonchi, No wheezes     Cardiovascular: S1, S2, Regular rate and rhythm     Gastrointestinal: Soft, Non-tender, Non-distended     Extremities: No calf tenderness DEVICES:   [ x] Restraints [x ] ET tube [ x] NGT/OGT     EXAMINATION:  PHYSICAL EXAM:    Constitutional: No Acute Distress     Neurological: Intubated, no EO, PERRL, (+) cough/gag/overbreathing, not Following, BUE trace localized, BLE WDs                                                 Pulmonary: Clear to Auscultation, No rales, No rhonchi, No wheezes     Cardiovascular: S1, S2, Regular rate and rhythm     Gastrointestinal: Soft, Non-tender, Non-distended     Extremities: No calf tenderness

## 2024-11-10 NOTE — CONSULT NOTE ADULT - SUBJECTIVE AND OBJECTIVE BOX
Neurology - Consult Note    -  Spectra: [phone] ([facility]), [phone] ([facility])  -    HPI: Patient HIEU HERRMANN is a 69y (1955) wo/man with a PMHx significant for ***      Review of Systems:  INCOMPLETE   CONSTITUTIONAL: No fevers or chills  EYES AND ENT: No visual changes or no throat pain   NECK: No pain or stiffness  RESPIRATORY: No hemoptysis or shortness of breath  CARDIOVASCULAR: No chest pain or palpitations  GASTROINTESTINAL: No melena or hematochezia  GENITOURINARY: No dysuria or hematuria  NEUROLOGICAL: +As stated in HPI above  SKIN: No itching, burning, rashes, or lesions   All other review of systems is negative unless indicated above.    Allergies:  Allergy Status Unknown      PMHx/PSHx/Family Hx: As above, otherwise see below   No pertinent past medical history    Diabetes mellitus    Glaucoma        Social Hx:  No current use of tobacco, alcohol, or illicit drugs  Lives with ***    Medications:  MEDICATIONS  (STANDING):  cefTRIAXone   IVPB 1000 milliGRAM(s) IV Intermittent every 24 hours  chlorhexidine 0.12% Liquid 15 milliLiter(s) Oral Mucosa every 12 hours  chlorhexidine 4% Liquid 1 Application(s) Topical daily  heparin   Injectable 5000 Unit(s) SubCutaneous every 8 hours  insulin lispro (ADMELOG) corrective regimen sliding scale   SubCutaneous every 6 hours  lacosamide IVPB 200 milliGRAM(s) IV Intermittent every 12 hours  pantoprazole  Injectable 40 milliGRAM(s) IV Push daily  polyethylene glycol 3350 17 Gram(s) Oral two times a day  propofol Infusion 50 MICROgram(s)/kG/Min (21 mL/Hr) IV Continuous <Continuous>  senna 2 Tablet(s) Oral at bedtime  sodium chloride 0.9%. 1000 milliLiter(s) (75 mL/Hr) IV Continuous <Continuous>    MEDICATIONS  (PRN):  acetaminophen     Tablet .. 650 milliGRAM(s) Oral every 6 hours PRN Temp greater or equal to 38.5C (101.3F), Mild Pain (1 - 3)  ondansetron Injectable 4 milliGRAM(s) IV Push every 6 hours PRN Nausea and/or Vomiting  oxyCODONE    IR 5 milliGRAM(s) Oral every 6 hours PRN Moderate Pain (4 - 6)  oxyCODONE    IR 10 milliGRAM(s) Oral every 6 hours PRN Severe Pain (7 - 10)      Vitals:  T(C): 37.7 (11-10-24 @ 14:30), Max: 37.7 (11-10-24 @ 14:30)  HR: 78 (11-10-24 @ 16:00) (78 - 86)  BP: 122/60 (11-10-24 @ 16:00) (119/57 - 151/67)  RR: 17 (11-10-24 @ 16:00) (17 - 19)  SpO2: 100% (11-10-24 @ 16:00) (100% - 100%)    Physical Examination: INCOMPLETE  General - NAD, pleasant, cooperative   Cardiovascular - Peripheral pulses palpable, no edema  Neurologic Exam:  Mental status - Awake, Alert, Oriented to person, place, and time. Speech fluent, repetition and naming intact. Follows simple and complex commands. Attention/concentration, recent and remote memory (including registration and recall), and fund of knowledge intact    Cranial nerves:  CN II: Visual fields are full to confrontation. Fundoscopic exam is normal with sharp discs. Pupils are 4 mm and briskly reactive to light. Visual acuity is 20/20 bilaterally.  CN III, IV, VI: EOMI, no nystagmus, no ptosis  CN V: Facial sensation is intact to pinprick in all 3 divisions bilaterally.  CN VII: Face is symmetric with normal eye closure and smile.  CN VII: Hearing is normal to rubbing fingers  CN IX, X: Palate elevates symmetrically. Phonation is normal.  CN XI: Head turning and shoulder shrug are intact  CN XII: Tongue is midline with normal movements and no atrophy.    Motor - Normal bulk and tone throughout. No pronator drift of out-stretched arms.  Strength testing            Deltoid      Biceps      Triceps     Wrist Extension    Wrist Flexion     Interossei         R            5                 5               5                     5                              5                        5                 5  L             5                 5               5                     5                              5                        5                 5              Hip Flexion    Hip Extension    Knee Flexion    Knee Extension    Dorsiflexion    Plantar Flexion  R              5                           5                       5                           5                            5                          5  L              5                           5                        5                           5                            5                          5    Sensation - Light touch/temperature OR pain/vibration intact in fingers and toes     DTR's -             Biceps      Triceps     Brachioradialis      Patellar    Ankle    Toes/plantar response  R             2+             2+                  2+                       2+            2+                 Down  L              2+             2+                 2+                        2+           2+                 Down    Coordination - Rapid alternating movements and fine finger movements are intact. There is no dysmetria on finger-to-nose and heel-knee-shin. There are no abnormal or extraneous movements. Romberg?    Gait and station - Posture is normal. Gait is steady with normal steps, base, arm swing, and turning. Heel and toe walking are normal. Tandem gait is normal     Labs:                        11.6   11.18 )-----------( 214      ( 10 Nov 2024 13:23 )             38.3     11-10    138  |  101  |  17  ----------------------------<  124[H]  3.7   |  21[L]  |  0.88    Ca    9.8      10 Nov 2024 13:23    TPro  7.5  /  Alb  3.7  /  TBili  0.9  /  DBili  x   /  AST  39  /  ALT  32  /  AlkPhos  83  11-10    CAPILLARY BLOOD GLUCOSE      POCT Blood Glucose.: 115 mg/dL (10 Nov 2024 13:06)    LIVER FUNCTIONS - ( 10 Nov 2024 13:23 )  Alb: 3.7 g/dL / Pro: 7.5 g/dL / ALK PHOS: 83 U/L / ALT: 32 U/L / AST: 39 U/L / GGT: x             PT/INR - ( 10 Nov 2024 13:23 )   PT: 11.3 sec;   INR: 0.99 ratio         PTT - ( 10 Nov 2024 13:23 )  PTT:23.0 sec  CSF:                  Radiology:     Neurology - Consult Note    -  Spectra: [phone] ([facility]), [phone] ([facility])  -    HPI: Patient HIEU HERRMANN is a 69y (1955) woman with a PMHx significant for HTN, DM, glaucoma who presented to the ED as hospital transfer for seizures. Patient initially presented to [facility] earlier today after she was found by her son with seizure activity. Patient had reported 7 consecutive episodes of full body shaking without return to baseline, lasting approximately 30 minutes prior to EMS arrival. Patient received Ativan, Versed in the field. When she arrived in [facility], she was noted to be unresponsive with tonic movement of bilateral arms and eye deviation (unclear which side). She was intubated for airway protection, started on fentanyl and propofol and loaded with Keppra 1g followed by 1500 mg. CT head revealed 1 cm mass along the anterior skull base with mild mass effect. Patient was transferred to [facility] for further management. Upon arrival to [facility], patient was intubated and sedated, admitted to NSCU. In NSCU, patient loaded with Vimpat 400 mg, started maintenance dose Vimpat 200 mg BID. Currently undergoing vEEG evaluation.    Review of Systems:  Unable to obtain, patient intubated and sedated    Allergies:  Allergy Status Unknown      PMHx/PSHx/Family Hx: As above, otherwise see below   No pertinent past medical history    Diabetes mellitus    Glaucoma        Social Hx:  Unable to obtain, patient unable to provide history    Medications:  MEDICATIONS  (STANDING):  cefTRIAXone   IVPB 1000 milliGRAM(s) IV Intermittent every 24 hours  chlorhexidine 0.12% Liquid 15 milliLiter(s) Oral Mucosa every 12 hours  chlorhexidine 4% Liquid 1 Application(s) Topical daily  heparin   Injectable 5000 Unit(s) SubCutaneous every 8 hours  insulin lispro (ADMELOG) corrective regimen sliding scale   SubCutaneous every 6 hours  lacosamide IVPB 200 milliGRAM(s) IV Intermittent every 12 hours  pantoprazole  Injectable 40 milliGRAM(s) IV Push daily  polyethylene glycol 3350 17 Gram(s) Oral two times a day  propofol Infusion 50 MICROgram(s)/kG/Min (21 mL/Hr) IV Continuous <Continuous>  senna 2 Tablet(s) Oral at bedtime  sodium chloride 0.9%. 1000 milliLiter(s) (75 mL/Hr) IV Continuous <Continuous>    MEDICATIONS  (PRN):  acetaminophen     Tablet .. 650 milliGRAM(s) Oral every 6 hours PRN Temp greater or equal to 38.5C (101.3F), Mild Pain (1 - 3)  ondansetron Injectable 4 milliGRAM(s) IV Push every 6 hours PRN Nausea and/or Vomiting  oxyCODONE    IR 5 milliGRAM(s) Oral every 6 hours PRN Moderate Pain (4 - 6)  oxyCODONE    IR 10 milliGRAM(s) Oral every 6 hours PRN Severe Pain (7 - 10)      Vitals:  T(C): 37.7 (11-10-24 @ 14:30), Max: 37.7 (11-10-24 @ 14:30)  HR: 78 (11-10-24 @ 16:00) (78 - 86)  BP: 122/60 (11-10-24 @ 16:00) (119/57 - 151/67)  RR: 17 (11-10-24 @ 16:00) (17 - 19)  SpO2: 100% (11-10-24 @ 16:00) (100% - 100%)    Physical Examination:   General - Intubated, sedated    Neurologic Exam:  Mental status - Obtunded, mild grimace to noxious stimuli, does not open eyes or follow commands  Cranial nerves: Pupils symmetric, L gaze preference with some roving movements. +corneal +cough/gag  Motor - Moving all extremities spontaneously  Sensation - Minimal movement to noxious stimuli in all extremities  DTR's - 1+ throughout, plantars downgoing  Coordination - Unable to assess, patient intubated/sedated  Gait and station - Unable to assess, patient intubated/sedated    Labs:                        11.6   11.18 )-----------( 214      ( 10 Nov 2024 13:23 )             38.3     11-10    138  |  101  |  17  ----------------------------<  124[H]  3.7   |  21[L]  |  0.88    Ca    9.8      10 Nov 2024 13:23    TPro  7.5  /  Alb  3.7  /  TBili  0.9  /  DBili  x   /  AST  39  /  ALT  32  /  AlkPhos  83  11-10    CAPILLARY BLOOD GLUCOSE      POCT Blood Glucose.: 115 mg/dL (10 Nov 2024 13:06)    LIVER FUNCTIONS - ( 10 Nov 2024 13:23 )  Alb: 3.7 g/dL / Pro: 7.5 g/dL / ALK PHOS: 83 U/L / ALT: 32 U/L / AST: 39 U/L / GGT: x             PT/INR - ( 10 Nov 2024 13:23 )   PT: 11.3 sec;   INR: 0.99 ratio         PTT - ( 10 Nov 2024 13:23 )  PTT:23.0 sec      Radiology:    CT head noncontrast 11/10:  IMPRESSION:  There is a 1.0 cm partially calcified mass along the anterior skull base which may represent a meningioma. There is mild mass effect on the inferior medial left frontal lobe.  No acute intracranial hemorrhage, or midline shift.  Consider follow-up imaging with contrast-enhanced MRI of the brain if no contraindications.

## 2024-11-10 NOTE — PROGRESS NOTE ADULT - SUBJECTIVE AND OBJECTIVE BOX
NSCU ATTENDING -- ADDITIONAL PROGRESS NOTE    Nighttime rounds were performed -- please refer to earlier Progress Note for HPI details.    T(C): 37.9 (11-10-24 @ 19:00), Max: 37.9 (11-10-24 @ 19:00)  HR: 73 (11-10-24 @ 19:00) (73 - 90)  BP: 104/56 (11-10-24 @ 19:00) (104/56 - 160/75)  RR: 17 (11-10-24 @ 19:00) (17 - 21)  SpO2: 100% (11-10-24 @ 19:00) (100% - 100%)  Wt(kg): --    Relevant labwork and imaging reviewed.    CBC:            11.6   11.18 )-----------( 214      ( 11-10-24 @ 13:23 )             38.3         Chem:         ( 11-10-24 @ 13:23 )    138  |  101  |  17  ----------------------------<  124[H]  3.7   |  21[L]  |  0.88        Liver Functions: ( 11-10-24 @ 13:23 )  Alb: 3.7 g/dL / Pro: 7.5 g/dL / ALK PHOS: 83 U/L / ALT: 32 U/L / AST: 39 U/L / GGT: x            MEDICATIONS  (STANDING):  cefTRIAXone   IVPB 1000 milliGRAM(s) IV Intermittent every 24 hours  chlorhexidine 0.12% Liquid 15 milliLiter(s) Oral Mucosa every 12 hours  chlorhexidine 4% Liquid 1 Application(s) Topical daily  dexMEDEtomidine Infusion 0.2 MICROgram(s)/kG/Hr (3.5 mL/Hr) IV Continuous <Continuous>  heparin   Injectable 5000 Unit(s) SubCutaneous every 8 hours  insulin lispro (ADMELOG) corrective regimen sliding scale   SubCutaneous every 6 hours  lacosamide IVPB 200 milliGRAM(s) IV Intermittent every 12 hours  pantoprazole  Injectable 40 milliGRAM(s) IV Push daily  polyethylene glycol 3350 17 Gram(s) Oral two times a day  senna 2 Tablet(s) Oral at bedtime  sodium chloride 0.9%. 1000 milliLiter(s) (75 mL/Hr) IV Continuous <Continuous>    MEDICATIONS  (PRN):  acetaminophen     Tablet .. 650 milliGRAM(s) Oral every 6 hours PRN Temp greater or equal to 38.5C (101.3F), Mild Pain (1 - 3)  fentaNYL    Injectable 25 MICROGram(s) IV Push every 2 hours PRN vent synchrony  ondansetron Injectable 4 milliGRAM(s) IV Push every 6 hours PRN Nausea and/or Vomiting  oxyCODONE    IR 5 milliGRAM(s) Oral every 6 hours PRN Moderate Pain (4 - 6)  oxyCODONE    IR 10 milliGRAM(s) Oral every 6 hours PRN Severe Pain (7 - 10)      [A/P]     ATTENDING STATEMENT:    Patient is critically ill, requiring critical care services.     Attending: I have personally and independently provided 30 minutes of critical care services.  This excludes any time spent on separate procedures or teaching.   NSCU ATTENDING -- ADDITIONAL PROGRESS NOTE    Nighttime rounds were performed -- please refer to earlier Progress Note for HPI details.    T(C): 37.9 (11-10-24 @ 19:00), Max: 37.9 (11-10-24 @ 19:00)  HR: 73 (11-10-24 @ 19:00) (73 - 90)  BP: 104/56 (11-10-24 @ 19:00) (104/56 - 160/75)  RR: 17 (11-10-24 @ 19:00) (17 - 21)  SpO2: 100% (11-10-24 @ 19:00) (100% - 100%)  Wt(kg): --    Relevant labwork and imaging reviewed.    CBC:            11.6   11.18 )-----------( 214      ( 11-10-24 @ 13:23 )             38.3         Chem:         ( 11-10-24 @ 13:23 )    138  |  101  |  17  ----------------------------<  124[H]  3.7   |  21[L]  |  0.88        Liver Functions: ( 11-10-24 @ 13:23 )  Alb: 3.7 g/dL / Pro: 7.5 g/dL / ALK PHOS: 83 U/L / ALT: 32 U/L / AST: 39 U/L / GGT: x            MEDICATIONS  (STANDING):  cefTRIAXone   IVPB 1000 milliGRAM(s) IV Intermittent every 24 hours  chlorhexidine 0.12% Liquid 15 milliLiter(s) Oral Mucosa every 12 hours  chlorhexidine 4% Liquid 1 Application(s) Topical daily  dexMEDEtomidine Infusion 0.2 MICROgram(s)/kG/Hr (3.5 mL/Hr) IV Continuous <Continuous>  heparin   Injectable 5000 Unit(s) SubCutaneous every 8 hours  insulin lispro (ADMELOG) corrective regimen sliding scale   SubCutaneous every 6 hours  lacosamide IVPB 200 milliGRAM(s) IV Intermittent every 12 hours  pantoprazole  Injectable 40 milliGRAM(s) IV Push daily  polyethylene glycol 3350 17 Gram(s) Oral two times a day  senna 2 Tablet(s) Oral at bedtime  sodium chloride 0.9%. 1000 milliLiter(s) (75 mL/Hr) IV Continuous <Continuous>    MEDICATIONS  (PRN):  acetaminophen     Tablet .. 650 milliGRAM(s) Oral every 6 hours PRN Temp greater or equal to 38.5C (101.3F), Mild Pain (1 - 3)  fentaNYL    Injectable 25 MICROGram(s) IV Push every 2 hours PRN vent synchrony  ondansetron Injectable 4 milliGRAM(s) IV Push every 6 hours PRN Nausea and/or Vomiting  oxyCODONE    IR 5 milliGRAM(s) Oral every 6 hours PRN Moderate Pain (4 - 6)  oxyCODONE    IR 10 milliGRAM(s) Oral every 6 hours PRN Severe Pain (7 - 10)      [A/P]  68yo woman found down by son, c/f GTC  15mg versed while transport, in ED intubated started on prop, given 2.5g keppra   CTH showing R frontal calcified mass  adm NSCU    precedex 0.3  intubated, no EO, RUE/RLE WD, LUE/LLE AG spon    f/u neurosurgery for OR plan  keep intubated, CPAP as tolerated 10/5  AEDs: lacosamide 200mg Q12  decadron   ceftriaxone 1g for now, likely 3d course, f/u cultures  keep NPO except for meds   MRI pending  cont VEEG  dopplers BLE pending  TTE pending    ATTENDING STATEMENT:    Patient is critically ill, requiring critical care services.     Attending: I have personally and independently provided 30 minutes of critical care services.  This excludes any time spent on separate procedures or teaching.   NSCU ATTENDING -- ADDITIONAL PROGRESS NOTE    Nighttime rounds were performed -- please refer to earlier Progress Note for HPI details.    T(C): 37.9 (11-10-24 @ 19:00), Max: 37.9 (11-10-24 @ 19:00)  HR: 73 (11-10-24 @ 19:00) (73 - 90)  BP: 104/56 (11-10-24 @ 19:00) (104/56 - 160/75)  RR: 17 (11-10-24 @ 19:00) (17 - 21)  SpO2: 100% (11-10-24 @ 19:00) (100% - 100%)  Wt(kg): --    Relevant labwork and imaging reviewed.    CBC:            11.6   11.18 )-----------( 214      ( 11-10-24 @ 13:23 )             38.3         Chem:         ( 11-10-24 @ 13:23 )    138  |  101  |  17  ----------------------------<  124[H]  3.7   |  21[L]  |  0.88        Liver Functions: ( 11-10-24 @ 13:23 )  Alb: 3.7 g/dL / Pro: 7.5 g/dL / ALK PHOS: 83 U/L / ALT: 32 U/L / AST: 39 U/L / GGT: x            MEDICATIONS  (STANDING):  cefTRIAXone   IVPB 1000 milliGRAM(s) IV Intermittent every 24 hours  chlorhexidine 0.12% Liquid 15 milliLiter(s) Oral Mucosa every 12 hours  chlorhexidine 4% Liquid 1 Application(s) Topical daily  dexMEDEtomidine Infusion 0.2 MICROgram(s)/kG/Hr (3.5 mL/Hr) IV Continuous <Continuous>  heparin   Injectable 5000 Unit(s) SubCutaneous every 8 hours  insulin lispro (ADMELOG) corrective regimen sliding scale   SubCutaneous every 6 hours  lacosamide IVPB 200 milliGRAM(s) IV Intermittent every 12 hours  pantoprazole  Injectable 40 milliGRAM(s) IV Push daily  polyethylene glycol 3350 17 Gram(s) Oral two times a day  senna 2 Tablet(s) Oral at bedtime  sodium chloride 0.9%. 1000 milliLiter(s) (75 mL/Hr) IV Continuous <Continuous>    MEDICATIONS  (PRN):  acetaminophen     Tablet .. 650 milliGRAM(s) Oral every 6 hours PRN Temp greater or equal to 38.5C (101.3F), Mild Pain (1 - 3)  fentaNYL    Injectable 25 MICROGram(s) IV Push every 2 hours PRN vent synchrony  ondansetron Injectable 4 milliGRAM(s) IV Push every 6 hours PRN Nausea and/or Vomiting  oxyCODONE    IR 5 milliGRAM(s) Oral every 6 hours PRN Moderate Pain (4 - 6)  oxyCODONE    IR 10 milliGRAM(s) Oral every 6 hours PRN Severe Pain (7 - 10)      [A/P]  68yo woman found down by son, c/f GTC  15mg versed while transport, in ED intubated started on prop, given 2.5g keppra   CTH showing R frontal calcified mass  adm NSCU    precedex 0.3  intubated, no EO, RUE/RLE WD, LUE/LLE AG spon    f/u neurosurgery for OR plan  keep intubated, CPAP as tolerated 10/5  AEDs: lacosamide 200mg Q12  decadron   ceftriaxone 1g for now, likely 3d course, f/u cultures  keep NPO except for meds   MRI pending  cont VEEG  dopplers BLE pending  TTE pending  replete hypokalemia 60mEq    ATTENDING STATEMENT:    Patient is critically ill, requiring critical care services.     Attending: I have personally and independently provided 30 minutes of critical care services.  This excludes any time spent on separate procedures or teaching.

## 2024-11-10 NOTE — CONSULT NOTE ADULT - ASSESSMENT
- Direct adm NSCU  - AEDs (currently on Vimpat 200 BID), vEEG  - CTX for UA/ fu Urine Cx  - MRI w/wo stereo  - ?CTCAP w/  - Continue to wean sedation (now down to 0.3 of precedex)

## 2024-11-10 NOTE — ED PROVIDER NOTE - CLINICAL SUMMARY MEDICAL DECISION MAKING FREE TEXT BOX
Attending MD Alfonso:  69-year-old woman with a history of diabetes glaucoma is transferred from outside hospital for evaluation of suspected seizure-like activity, intubated sedated on propofol and fentanyl infusions.  Patient reportedly was found on the ground by her son this morning patient received a total of 15 mg of Versed during transport from first ED, patient was intubated in the ED.  Patient received a total of 2500 of IV Keppra at outside hospital.  Patient had a CT of the head that showed "1 cm partially calcified mass along the anterior skull base which may represent a meningioma.  There is mild mass effect on the inferior medial left frontal lobe.  No acute intracranial hemorrhage or midline shift."  Remainder of history is unobtainable as patient is intubated and sedated.    Patient's vital signs are nonactionable.  Intubated, breath sounds present equal bilateral anterior lung fields.  The head is atraumatic, patient is moving all extremities spontaneously but does not open her eyes to voice or follow commands (this is on propofol infusion and fentanyl infusion).  Pupils are 3 mm to 2 mm bilaterally.  Abdomen nontender.  No evident external trauma.  Extremities warm to the touch.    Patient is presenting for evaluation of suspected seizure-like activity intubated in outside hospital received IV Keppra currently on propofol and fentanyl infusions.  Plan at this time will be to continue mechanical ventilation, obtain neurosurgical and neurology consultations.          *The above represents an initial assessment/impression. Please refer to progress notes for potential changes in patient clinical course*

## 2024-11-10 NOTE — ED ADULT NURSE REASSESSMENT NOTE - NS ED NURSE REASSESS COMMENT FT1
Pt has keppra, propofol and fentanyl infusing. IV potassium and rocephin was not administered, EMS made aware.

## 2024-11-10 NOTE — H&P ADULT - NSVTERISKREFERASSESS_GEN_ALL_CORE
Endocrinology Visit    Chief Complaint: Type 2 diabetes    History of Present Illness: Jaime Nelson is a [de-identified] y.o. female who returns for type 2 diabetes mellitus. I saw her in initial consultation in June 2017. A1c values have ranged from 6.5 to 7.3% on regimen of metformin and glipizide. At her last visit, I switched her to XR metformin. A1c has improved to 6.8% (was 7.3% in April). Current glycemic medication regimen is metformin XR 750mg BID plus glipizide 7.5mg Qam before breakfast and 5mg before dinner. Home blood glucose monitoring frequency: 1-2 times/day  Glucose range at home: 137-218 (lower when she checks fasting)  The patient has not had hypoglycemia recently. She does not know what she has done to improve her A1c. At her last visit she reported being less active over the winter, also stressed caring for her  and his health issues. Things have calmed down over the past few months. She has no known complications from diabetes. Has cataracts but no diabetic retinopathy - just had an eye visit yesterday. 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 9) E66.9    Chronic prescription benzodiazepine use Z79.899    Mitral valve prolapse, nonrheumatic I34.1    Osteoarthritis involving multiple joints on both sides of body M15.9       Past Medical History:    Past Medical History:   Diagnosis Date    Diabetes ([address]     adult-onset    IBS (irritable bowel syndrome)     Irregular heart beat     (PVCs)    MVP (mitral valve prolapse) h/o (neg ECHO-3/06)       Past Surgical History:  Past Surgical History:   Procedure Laterality Date    HX HEENT      tonsilectomy    HX HYSTERECTOMY      partial Hysterectomy    HX ORTHOPAEDIC      left knee-arthroscopic    HX ORTHOPAEDIC      carpal tunnel    HX OTHER SURGICAL Left     wrist, middle finger-trigger        Social History:  Social History     Social History    Marital status:      Spouse name: N/A    Number of children: N/A    Years of education: N/A     Occupational History    Not on file. Social History Main Topics    Smoking status: Never Smoker    Smokeless tobacco: Never Used    Alcohol use 0.0 oz/week     0 Standard drinks or equivalent per week      Comment: occassionally    Drug use: No    Sexual activity: No     Other Topics Concern    Not on file     Social History Narrative       Family History:  Family History   Problem Relation Age of Onset    Heart Failure Father      CHF    Emphysema Father      (smoker)    COPD Father     Arthritis-osteo Mother     Arrhythmia Mother     Heart Attack Mother     Heart Attack Maternal Grandmother        Medications:     Current Outpatient Prescriptions:     sertraline (ZOLOFT) 50 mg tablet, Take 1 Tab by mouth daily. , Disp: 90 Tab, Rfl: 2    glipiZIDE (GLUCOTROL) 5 mg tablet, Take 1 and half tabs with breakfast and 1 tab with dinner, Disp: 225 Tab, Rfl: 1    amLODIPine (NORVASC) 5 mg tablet, TAKE ONE TABLET BY MOUTH ONE TIME DAILY, Disp: , Rfl: 12    glucose blood VI test strips (ONETOUCH ULTRA BLUE TEST STRIP) strip, 2 (two) times a day. test blood sugar, Disp: , Rfl:     metFORMIN ER (GLUCOPHAGE XR) 750 mg tablet, Take 1 Tab by mouth two (2) times daily (with meals). , Disp: 60 Tab, Rfl: 5    Blood-Glucose Meter monitoring kit, Check BG 2 times/day; onetouch meter; E11.9, Disp: 1 Kit, Rfl: 0    glucose blood VI test strips (BLOOD GLUCOSE TEST) strip, Check BG 2 times/day, Disp: 100 Strip, Rfl: 11    diclofenac (VOLTAREN) 1 % gel, Apply  to affected area four (4) times daily. , Disp: , Rfl:     fluticasone (FLONASE) 50 mcg/actuation nasal spray, 2 Sprays by Both Nostrils route daily. , Disp: 1 Bottle, Rfl: 0    cyanocobalamin (VITAMIN B-12) 500 mcg tablet, Take 500 mcg by mouth two (2) times a day., Disp: , Rfl:     finasteride (PROSCAR) 5 mg tablet, Take 5 mg by mouth daily. , Disp: , Rfl:     biotin 2,500 mcg tab, Take 10,000 mcg by mouth., Disp: , Rfl:     Cholecalciferol, Vitamin D3, (VITAMIN D3) 1,000 unit cap, Take 1,000 Units by mouth., Disp: , Rfl:     MULTIVITAMIN PO, Take 1 Tab by mouth daily. , Disp: , Rfl:     aspirin delayed-release 81 mg tablet, Take 81 mg by mouth daily. Every other day, Disp: , Rfl:     LORazepam (ATIVAN) 0.5 mg tablet, Take 0.5 mg by mouth two (2) times a day., Disp: , Rfl:     sertraline (ZOLOFT) 25 mg tablet, TAKE ONE TABLET BY MOUTH ONE TIME DAILY, Disp: , Rfl: 4    clobetasol (TEMOVATE) 0.05 % external solution, USE ON SCALP 1-2 TIMES DAILY AS NEEDED, Disp: , Rfl: 5    nitroglycerin (NITROSTAT) 0.4 mg SL tablet, 0.4 mg by SubLINGual route every five (5) minutes as needed for Chest Pain. Up to 3 doses. , Disp: , Rfl:     HYDROcodone-acetaminophen (NORCO)  mg tablet, TAKE ONE TABLET BY MOUTH EVERY 4 TO 6 HOURS AS NEEDED FOR PAIN, Disp: , Rfl:     fexofenadine (ALLEGRA) 180 mg tablet, Take 1 Tab by mouth daily. , Disp: 30 Tab, Rfl: 4    amoxicillin (AMOXIL) 875 mg tablet, TAKE ONE TABLET BY MOUTH TWICE A DAY UNTIL GONE, Disp: , Rfl: 0    HYDROcodone-acetaminophen (NORCO)  mg tablet, TAKE ONE TABLET BY MOUTH EVERY 4 TO 6 HOURS AS NEEDED FOR PAIN, Disp: , Rfl: 0    amLODIPine (NORVASC) 2.5 mg tablet, Take 2.5 mg by mouth daily. Pt unsure of dosage, Disp: , Rfl:     NAPROXEN SODIUM (ALEVE PO), Take 220 mg by mouth daily as needed. , Disp: , Rfl:     Allergies:   Allergies   Allergen Reactions    Januvia [Sitagliptin] Rash    B12 [Cyanocobalamin-Cobamamide] Hives w/injection    Decongestant [Brompheniramine Maleate] Other (comments)     heart    Metrizamide Hives     w/injection    Brompheniramine Rash     heart       Physical Examination:  Blood pressure 128/78, pulse 83, resp. rate 16, height 4' 11\" (1.499 m), weight 169 lb (76.7 kg), SpO2 97 %. Gen: no acute distress  HEENT: mucous membranes moist  Thyroid: no enlargement or nodules noted, + submandibular fullness  CAD: normal rate, regular rhythm. No murmur rubs or gallops  PULM: clear to ausculation, no wheezes, rhonchis or rales. EXT: tr edema; PT pulses 2+  Neuro: grossly non focal  Psych: pleasant, good insight into medical hx  Skin: warm, dry      Clinical Data Review:  Lab Results   Component Value Date/Time    Hemoglobin A1c 6.8 (H) 08/20/2018 09:36 AM    Hemoglobin A1c 7.3 (H) 04/27/2018 10:33 AM    Hemoglobin A1c 6.8 (H) 11/16/2017 10:10 AM      Lab Results   Component Value Date/Time    Sodium 142 08/20/2018 09:38 AM    Potassium 5.0 08/20/2018 09:38 AM    Chloride 104 08/20/2018 09:38 AM    CO2 23 08/20/2018 09:38 AM    Anion gap 11 02/02/2010 09:45 AM    Glucose 149 (H) 08/20/2018 09:38 AM    BUN 11 08/20/2018 09:38 AM    Creatinine 0.71 08/20/2018 09:38 AM    BUN/Creatinine ratio 15 08/20/2018 09:38 AM    GFR est AA 93 08/20/2018 09:38 AM    GFR est non-AA 81 08/20/2018 09:38 AM    Calcium 9.4 08/20/2018 09:38 AM     Lab Results   Component Value Date/Time    Microalb/Creat ratio (ug/mg creat.) 11.3 08/20/2018 09:36 AM     Lab Results   Component Value Date/Time    Cholesterol, total 156 08/20/2018 09:38 AM    HDL Cholesterol 54 08/20/2018 09:38 AM    LDL, calculated 80 08/20/2018 09:38 AM    VLDL, calculated 22 08/20/2018 09:38 AM    Triglyceride 108 08/20/2018 09:38 AM    CHOL/HDL Ratio 2.9 02/02/2010 09:45 AM     Thyroid US April 2018  FINDINGS:  The right thyroid lobe measures 4.2 x 1.3 x 1.6 cm. The left thyroid  lobe measures 4.7 x 1.7 x 1.3 cm.  The isthmus measures 0.4 cm in AP Refer to the Assessment tab to view/cancel completed assessment. thickness.     The thyroid gland is normal in size, echogenicity, and vascularity. A cyst or  hypoechoic solid nodule in the upper right thyroid lobe measures 0.3 cm. There  are no suspicious thyroid nodules or calcification.     IMPRESSION:  Normal size of the thyroid gland without suspicious nodule. Assessment and Plan:  Patient is a [de-identified] y.o. female here for type 2 diabetes, control of which is adequate goal based on fingerstick glucose data and A1c. We discussed that goal A1c for her is less than ~7.5%. Continue current regimen. Glycemic Medication Changes:  - continue metformin XR 750mg BID  - continue glipizide 7.5mg Qam, 5mg Qpm (30 min AC meals)  - notify me for BG<70    DM Health Maintenance: pertinent items updated in HM tab  A1c: update Q6mo  Cv/Lipids: not on statin, LDL is <100 and I don't want to risk statin s/e at thist lito  BP/Renal: BP is at goal today, not on ACEi, microalbumin UTD and nonelevated  Vaccines: per PCP  Podiatry: no active issues, encouraged well-fitting footwear and daily inspection  Neuro: foot exam UTD  Ophtho: yearly eye exam recommended  Diet and exercise: discussed healthy eating and exercise recommendations, particularly reduction in dietary carbohydrates    I spent 25 minutes with the patient today and > 50% of the time was spent counseling the patient about diabetes management including medication options and dietary modification. Patient verbalized an understanding and will return to clinic in 12 months or sooner if needed. Thank you for the opportunity to participate in this patient's care.     Karl Schroeder MD  [facility]  [facility]

## 2024-11-10 NOTE — CONSULT NOTE ADULT - SUBJECTIVE AND OBJECTIVE BOX
Cecy Frey  69F, RHD, PMH DM (insulin dependent), HTN and glaucoma, presented to [facility] VS today after son found her on the ground this AM shaking entire body. S/p 15 of versed, 2,500 of Keppra, then intubated w prop. CTH showing 1 cm partially calcified L anterior skull base mass. No acute bleed or MLS.     Exam off sedation: Intubated, no EO, PERRL, LUE localizes, RUE wd, BLE WDs, no shaking/ seizure like activity    Labs: Glu 124, electrolytes wnl, U tox neg, UA+.

## 2024-11-10 NOTE — ED ADULT TRIAGE NOTE - CHIEF COMPLAINT QUOTE
BIBEMS per son he heard pt moaning around 9AM and saw her on the ground in hallway. Per EMS, pt seizing x7 times. EMS placed LAC #20 and gave 5mg Versed IM and 10mg IV push. On NRB 95% O2 sat. BIBEMS per son he heard pt moaning around 9AM and saw her on the ground in hallway. Per EMS, pt seizing x7 times. EMS placed LAC #20 and gave 5mg Versed IM and 10mg IV push. On NRB 95%. BIBEMS per son he heard pt moaning around 9AM and saw her on the ground in hallway. Per EMS, pt seizing x7 times. EMS placed LAC #20 and gave 5mg Versed IM and 10mg IV push. On NRB 95%. PMH DM, Glaucoma.

## 2024-11-10 NOTE — ED PROVIDER NOTE - OBJECTIVE STATEMENT
68 y/o F with PMH DM, glaucoma presenting to the ED w/ seizure. Per EMS, patient had 7 witnessed seizures with tonic clonic activity. She was found on the ground by her son this morning. No known seizure history. She did fall recently and may have hit her head. No fever at home. Per EMS, patient had episode of bowel incontinence. Patient received 15mg versed in the field. Hx is limited.

## 2024-11-10 NOTE — ED ADULT NURSE NOTE - NSFALLHARMRISKINTERV_ED_ALL_ED
Assistance OOB with selected safe patient handling equipment if applicable/Assistance with ambulation/Communicate risk of Fall with Harm to all staff, patient, and family/Encourage patient to sit up slowly, dangle for a short time, stand at bedside before walking/Monitor gait and stability/Monitor for mental status changes and reorient to person, place, and time, as needed/Provide visual cue: red socks, yellow wristband, yellow gown, etc/Reinforce activity limits and safety measures with patient and family/Review medications for side effects contributing to fall risk/Toileting schedule using arm’s reach rule for commode and bathroom/Use of alarms - bed, stretcher, chair and/or video monitoring/Bed in lowest position, wheels locked, appropriate side rails in place/Call bell, personal items and telephone in reach/Instruct patient to call for assistance before getting out of bed/chair/stretcher/Non-slip footwear applied when patient is off stretcher/[place] to call system/Physically safe environment - no spills, clutter or unnecessary equipment/Purposeful Proactive Rounding/Room/bathroom lighting operational, light cord in reach

## 2024-11-10 NOTE — ED PROVIDER NOTE - PHYSICAL EXAMINATION
GENERAL: unresponsive   HEENT: NC/AT, moist mucous membranes, eyes deviated to the right, pupils pinpoint bilaterally  LUNGS: CTAB, no wheezes or crackles   CARDIAC: RRR, no m/r/g  ABDOMEN: Soft, non tender, non distended, no rebound, no guarding  BACK: No midline spinal tenderness, no CVA tenderness  EXT: No edema, no calf tenderness, 2+ DP pulses bilaterally, no deformities.  NEURO: Unresponsive. Moving all extremities.  SKIN: Warm and dry. No rash.

## 2024-11-10 NOTE — ED PROVIDER NOTE - ATTENDING CONTRIBUTION TO CARE
Attending MD Alfonso:  I have seen and examined this patient and fully participated in the care of this patient as the teaching attending. I personally made/approved the management plan and take responsibility for the patient management.

## 2024-11-11 LAB
A1C WITH ESTIMATED AVERAGE GLUCOSE RESULT: 7.1 % — HIGH (ref 4–5.6)
ADD ON TEST-SPECIMEN IN LAB: SIGNIFICANT CHANGE UP
ALBUMIN SERPL ELPH-MCNC: 2 G/DL — LOW (ref 3.3–5)
ALP SERPL-CCNC: 43 U/L — SIGNIFICANT CHANGE UP (ref 40–120)
ALT FLD-CCNC: 17 U/L — SIGNIFICANT CHANGE UP (ref 10–45)
AST SERPL-CCNC: 17 U/L — SIGNIFICANT CHANGE UP (ref 10–40)
B-OH-BUTYR SERPL-SCNC: 0.5 MMOL/L — HIGH
BILIRUB DIRECT SERPL-MCNC: 0.2 MG/DL — SIGNIFICANT CHANGE UP (ref 0–0.3)
BILIRUB INDIRECT FLD-MCNC: 0.3 MG/DL — SIGNIFICANT CHANGE UP (ref 0.2–1)
BILIRUB SERPL-MCNC: 0.5 MG/DL — SIGNIFICANT CHANGE UP (ref 0.2–1.2)
C PEPTIDE SERPL-MCNC: 0.2 NG/ML — LOW (ref 1.1–4.4)
CORTIS SERPL-MCNC: 11 UG/DL — SIGNIFICANT CHANGE UP
ESTIMATED AVERAGE GLUCOSE: 157 MG/DL — HIGH (ref 68–114)
GLUCOSE BLDC GLUCOMTR-MCNC: 101 MG/DL — HIGH (ref 70–99)
GLUCOSE BLDC GLUCOMTR-MCNC: 128 MG/DL — HIGH (ref 70–99)
GLUCOSE BLDC GLUCOMTR-MCNC: 68 MG/DL — LOW (ref 70–99)
GLUCOSE BLDC GLUCOMTR-MCNC: 69 MG/DL — LOW (ref 70–99)
GLUCOSE BLDC GLUCOMTR-MCNC: 70 MG/DL — SIGNIFICANT CHANGE UP (ref 70–99)
GLUCOSE BLDC GLUCOMTR-MCNC: 71 MG/DL — SIGNIFICANT CHANGE UP (ref 70–99)
GLUCOSE BLDC GLUCOMTR-MCNC: 88 MG/DL — SIGNIFICANT CHANGE UP (ref 70–99)
GLUCOSE BLDC GLUCOMTR-MCNC: 89 MG/DL — SIGNIFICANT CHANGE UP (ref 70–99)
GLUCOSE BLDC GLUCOMTR-MCNC: 91 MG/DL — SIGNIFICANT CHANGE UP (ref 70–99)
GLUCOSE BLDC GLUCOMTR-MCNC: 97 MG/DL — SIGNIFICANT CHANGE UP (ref 70–99)
INSULIN SERPL-MCNC: 7.8 UU/ML — SIGNIFICANT CHANGE UP (ref 2.6–24.9)
MRSA PCR RESULT.: SIGNIFICANT CHANGE UP
PROT SERPL-MCNC: 4.3 G/DL — LOW (ref 6–8.3)
S AUREUS DNA NOSE QL NAA+PROBE: DETECTED
TSH SERPL-MCNC: 0.29 UIU/ML — SIGNIFICANT CHANGE UP (ref 0.27–4.2)

## 2024-11-11 PROCEDURE — 74177 CT ABD & PELVIS W/CONTRAST: CPT | Mod: 26

## 2024-11-11 PROCEDURE — 95718 EEG PHYS/QHP 2-12 HR W/VEEG: CPT

## 2024-11-11 PROCEDURE — 71260 CT THORAX DX C+: CPT | Mod: 26

## 2024-11-11 PROCEDURE — 99291 CRITICAL CARE FIRST HOUR: CPT

## 2024-11-11 PROCEDURE — 93306 TTE W/DOPPLER COMPLETE: CPT | Mod: 26

## 2024-11-11 PROCEDURE — 93970 EXTREMITY STUDY: CPT | Mod: 26

## 2024-11-11 RX ORDER — LACOSAMIDE 10 MG/ML
200 INJECTION INTRAVENOUS
Refills: 0 | Status: DISCONTINUED | OUTPATIENT
Start: 2024-11-11 | End: 2024-11-12

## 2024-11-11 RX ADMIN — PANTOPRAZOLE SODIUM 40 MILLIGRAM(S): 40 TABLET, DELAYED RELEASE ORAL at 11:05

## 2024-11-11 RX ADMIN — Medication 100 MILLIGRAM(S): at 22:06

## 2024-11-11 RX ADMIN — CHLORHEXIDINE GLUCONATE 1 APPLICATION(S): 1.2 RINSE ORAL at 11:06

## 2024-11-11 RX ADMIN — POLYETHYLENE GLYCOL 3350 17 GRAM(S): 17 POWDER, FOR SOLUTION ORAL at 05:40

## 2024-11-11 RX ADMIN — POTASSIUM CHLORIDE 20 MILLIEQUIVALENT(S): 600 TABLET, EXTENDED RELEASE ORAL at 02:20

## 2024-11-11 RX ADMIN — Medication 12.5 MILLILITER(S): at 01:49

## 2024-11-11 RX ADMIN — CHLORHEXIDINE GLUCONATE 15 MILLILITER(S): 1.2 RINSE ORAL at 05:34

## 2024-11-11 RX ADMIN — POLYETHYLENE GLYCOL 3350 17 GRAM(S): 17 POWDER, FOR SOLUTION ORAL at 17:42

## 2024-11-11 RX ADMIN — Medication 50 MILLILITER(S): at 11:08

## 2024-11-11 RX ADMIN — LACOSAMIDE 140 MILLIGRAM(S): 10 INJECTION INTRAVENOUS at 05:30

## 2024-11-11 RX ADMIN — Medication 12.5 MILLILITER(S): at 00:45

## 2024-11-11 RX ADMIN — Medication 5000 UNIT(S): at 05:34

## 2024-11-11 RX ADMIN — Medication 5000 UNIT(S): at 22:06

## 2024-11-11 RX ADMIN — POTASSIUM CHLORIDE 20 MILLIEQUIVALENT(S): 600 TABLET, EXTENDED RELEASE ORAL at 00:03

## 2024-11-11 RX ADMIN — Medication 5000 UNIT(S): at 13:38

## 2024-11-11 RX ADMIN — Medication 1 APPLICATION(S): at 17:42

## 2024-11-11 RX ADMIN — LACOSAMIDE 200 MILLIGRAM(S): 10 INJECTION INTRAVENOUS at 17:42

## 2024-11-11 NOTE — PROGRESS NOTE ADULT - ASSESSMENT
ASSESSMENT/PLAN: 69F presented with Seizure activity     NEURO: ***  Neuro checks q1/vitals q1  11/10  OSH CTH: 1.0 cm partially calcified mass along the anterior skull base which may represent a meningioma. Mild mass effect on inferior medial frontal lobe  MRI w/wout pending  Vimpat 400mg load, continue Vimpat 200 mg BID  vEEG in progress  Sedation: Propofol  Pain: tylenol and oxycodone PRN   Activity: [] OOB as tolerated [x] Bedrest [] PT [] OT [] PMNR      PULM:  Intubated, SpO2 >92%  16/400/30/5    CV:  -160mmHg  TTE-p    RENAL:  IVF until good PO intake    GI:  Diet: NGT- NPO  senna and miralax for bowel regimen  Last Bowel Movement: PTA  GI prophylaxis [] not indicated [x] PPI [] other:      ENDO:   Goal euglycemia (-180)  ISS  a1c pending    HEME/ONC:  VTE prophylaxis: [x] SCDs [x] SQH BID  LED-p    ID:  11/10 at [facility] UA (+)  started on Ceftriaxone for 3 days till 11/13  Urine cultures pending    MISC:    SOCIAL/FAMILY:  [x] awaiting [] updated at bedside [] family meeting    CODE STATUS:  [x] Full Code [] DNR [] DNI [] Palliative/Comfort Care    DISPOSITION:  [x] ICU [] Stroke Unit [] Floor [] EMU [] RCU [] PCU    [x] Patient is at high risk of neurologic deterioration/death due to:       Contact: [phone] ASSESSMENT/PLAN: 69F presented with Seizure activity   #11/10  OSH CTH: 1.0 cm partially calcified mass along the anterior skull base which may represent a meningioma. Mild mass effect on inferior medial frontal lobe    NEURO:   Neuro checks q2 -> space to q4 after extubation  MRI w/wout pending  Vimpat 200 mg BID  vEEG in progress  Sedation: low dose precedex  Pain: tylenol and oxycodone PRN   Activity: PT/OT    PULM:  SpO2 >92%  Extubate -> pulmonary toilet    CV: #11/11 EF 55-60%  -160mmHg  ensure baseline ECG    RENAL:  replete electrolytes PRN  external catheter  IVF until taking PO    GI:  Diet: NGT- NPO  senna and miralax for bowel regimen  Last Bowel Movement: PTA  GI prophylaxis [] not indicated [x] PPI [] other:    ENDO: A1c 7.1  Goal euglycemia (-180)  ISS    HEME/ONC: #LED 11/11- negative  VTE prophylaxis: [x] SCDs [x] SQH BID  LED-p    ID:#11/[id number] at [facility] UA (+)  started on Ceftriaxone for 3 days till 11/13  Urine cultures pending    Dispo: potentially to EMU in PM if stable

## 2024-11-11 NOTE — OCCUPATIONAL THERAPY INITIAL EVALUATION ADULT - SHORT TERM MEMORY, REHAB EVAL
Pt given SBT; difficulty following commands and requires increased time to answer questions, and cues to stay on task. able to state month but not year, unable to state time. Pt only able to count backwards from 20-15 then would start counting forwards, unable to say months of year in reverse order, only able to recall 1/6 parts of name and address with cue/impaired

## 2024-11-11 NOTE — PROGRESS NOTE ADULT - ASSESSMENT
MR brain stereo w/wo- p      CT CAP wwo- p      vEEG in progress    CPAP as tolerated     LED-p    TTE-p    Urine Cx pending

## 2024-11-11 NOTE — PATIENT PROFILE ADULT - FALL HARM RISK - HARM RISK INTERVENTIONS
Assistance with ambulation/Assistance OOB with selected safe patient handling equipment/Communicate Risk of Fall with Harm to all staff/Discuss with provider need for PT consult/Monitor gait and stability/Reinforce activity limits and safety measures with patient and family/Tailored Fall Risk Interventions/Visual Cue: Yellow wristband and red socks/Bed in lowest position, wheels locked, appropriate side rails in place/Call bell, personal items and telephone in reach/Instruct patient to call for assistance before getting out of bed or chair/Non-slip footwear when patient is out of bed/[place] to call system/Physically safe environment - no spills, clutter or unnecessary equipment/Purposeful Proactive Rounding/Room/bathroom lighting operational, light cord in reach

## 2024-11-11 NOTE — PROGRESS NOTE ADULT - SUBJECTIVE AND OBJECTIVE BOX
NSICU ATTENDING PROGRESS NOTE    Historical Review:   69F with PMH diabetes and glaucoma. Presented to [facility] 11/10 after son found her on the ground this morning presumed seizure-like activity. Patient received a total of 15 mg of Versed during transport from first ED, Patient was intubated in the ED, started on propofol and fentanyl infusions.  Patient received a total of 2500 of IV Keppra at outside hospital.  Patient had a CT of the head that showed "1 cm partially calcified mass along the anterior skull base which may represent a meningioma.  There is mild mass effect on the inferior medial left frontal lobe.  No acute intracranial hemorrhage or midline shift."  Remainder of history is unobtainable as patient is intubated and sedated. Patient transferred to [facility] for further interventions.    Admission Scores  GCS:  Sedation (10 Nov 2024 13:47)    24hr EVENTS:  11/10 - no seizures on EEG, propofol turned off    ROS: [x]  Unable to assess due to mental status/intubation    ----------------------------------------------------------------------------------------------------  PHYSICAL EXAM:  Constitutional: No Acute Distress     Neurological: Intubated, no EO, PERRL, (+) cough/gag/overbreathing, not Following, BUE trace localized, BLE WDs                                                 Pulmonary: Clear to Auscultation, No rales, No rhonchi, No wheezes     Cardiovascular: S1, S2, Regular rate and rhythm     Gastrointestinal: Soft, Non-tender, Non-distended     Extremities: No calf tenderness    -----------------------------------------------------------------------------------------------------  ICU Vital Signs Last 24 Hrs  T(C): 36.8 (11 Nov 2024 07:00), Max: 37.9 (10 Nov 2024 19:00)  T(F): 98.2 (11 Nov 2024 07:00), Max: 100.2 (10 Nov 2024 19:00)  HR: 64 (11 Nov 2024 07:00) (60 - 90)  BP: 137/71 (11 Nov 2024 07:00) (101/52 - 160/75)  BP(mean): 98 (11 Nov 2024 07:00) (73 - 108)  ABP: --  ABP(mean): --  RR: 20 (11 Nov 2024 07:00) (16 - 32)  SpO2: 100% (11 Nov 2024 07:00) (100% - 100%)    O2 Parameters below as of 11 Nov 2024 07:00  Patient On (Oxygen Delivery Method): ventilator    O2 Concentration (%): 40        I&O's Summary    10 Nov 2024 07:01  -  11 Nov 2024 07:00  --------------------------------------------------------  IN: 1478.5 mL / OUT: 1405 mL / NET: 73.5 mL        MEDICATIONS  (STANDING):  cefTRIAXone   IVPB 1000 milliGRAM(s) IV Intermittent every 24 hours  chlorhexidine 0.12% Liquid 15 milliLiter(s) Oral Mucosa every 12 hours  chlorhexidine 4% Liquid 1 Application(s) Topical daily  dexMEDEtomidine Infusion 0.2 MICROgram(s)/kG/Hr (3.5 mL/Hr) IV Continuous <Continuous>  heparin   Injectable 5000 Unit(s) SubCutaneous every 8 hours  insulin lispro (ADMELOG) corrective regimen sliding scale   SubCutaneous every 6 hours  lacosamide IVPB 200 milliGRAM(s) IV Intermittent every 12 hours  pantoprazole  Injectable 40 milliGRAM(s) IV Push daily  polyethylene glycol 3350 17 Gram(s) Oral two times a day  senna 2 Tablet(s) Oral at bedtime  sodium chloride 0.9%. 1000 milliLiter(s) (75 mL/Hr) IV Continuous <Continuous>      RESPIRATORY:  Mode: CPAP with PS  FiO2: 30  PEEP: 5  PS: 10  MAP: 8  PIP: 15        IMAGING:   Recent imaging studies were reviewed.    LAB RESULTS:                          11.6   11.18 )-----------( 214      ( 10 Nov 2024 13:23 )             38.3       PT/INR - ( 10 Nov 2024 13:23 )   PT: 11.3 sec;   INR: 0.99 ratio         PTT - ( 10 Nov 2024 13:23 )  PTT:23.0 sec    11-10    138  |  101  |  17  ----------------------------<  124[H]  3.7   |  21[L]  |  0.88    Ca    9.8      10 Nov 2024 13:23    TPro  7.5  /  Alb  3.7  /  TBili  0.9  /  DBili  x   /  AST  39  /  ALT  32  /  AlkPhos  83  11-10                -----------------------------------------------------------------------------------------------------------------------------------------------------------------------------------                     NSICU ATTENDING PROGRESS NOTE    Historical Review:   69F with PMH diabetes and glaucoma. Presented to [facility] 11/10 after son found her on the ground this morning presumed seizure-like activity. Patient received a total of 15 mg of Versed during transport from first ED, Patient was intubated in the ED, started on propofol and fentanyl infusions.  Patient received a total of 2500 of IV Keppra at outside hospital.  Patient had a CT of the head that showed "1 cm partially calcified mass along the anterior skull base which may represent a meningioma.  There is mild mass effect on the inferior medial left frontal lobe.  No acute intracranial hemorrhage or midline shift."  Remainder of history is unobtainable as patient is intubated and sedated. Patient transferred to [facility] for further interventions.    Admission Scores  GCS:  Sedation (10 Nov 2024 13:47)    24hr EVENTS:  11/10 - no seizures on EEG, propofol turned off, stable on precedex overnight, brief hypoglycemia  11/11 - tolerating CPAP 5/5, cuff leak +, no secretions    ROS: [x]  Unable to assess due to mental status/intubation    ----------------------------------------------------------------------------------------------------  PHYSICAL EXAM:  Constitutional: No Acute Distress     Neurological: Intubated, EO, following commands  BUE trace localized, BLE WDs  EEG head wrap in place                                                 Pulmonary: normal WOB on pressure support  Cardiovascular: S1, S2, Regular rate and rhythm   Gastrointestinal: Soft, Non-tender, Non-distended   Extremities: No calf tenderness    -----------------------------------------------------------------------------------------------------  ICU Vital Signs Last 24 Hrs  T(C): 36.8 (11 Nov 2024 07:00), Max: 37.9 (10 Nov 2024 19:00)  T(F): 98.2 (11 Nov 2024 07:00), Max: 100.2 (10 Nov 2024 19:00)  HR: 64 (11 Nov 2024 07:00) (60 - 90)  BP: 137/71 (11 Nov 2024 07:00) (101/52 - 160/75)  BP(mean): 98 (11 Nov 2024 07:00) (73 - 108)  RR: 20 (11 Nov 2024 07:00) (16 - 32)  SpO2: 100% (11 Nov 2024 07:00) (100% - 100%)    O2 Parameters below as of 11 Nov 2024 07:00  Patient On (Oxygen Delivery Method): ventilator    O2 Concentration (%): 40        I&O's Summary    10 Nov 2024 07:01  -  11 Nov 2024 07:00  --------------------------------------------------------  IN: 1478.5 mL / OUT: 1405 mL / NET: 73.5 mL        MEDICATIONS  (STANDING):  cefTRIAXone   IVPB 1000 milliGRAM(s) IV Intermittent every 24 hours  chlorhexidine 0.12% Liquid 15 milliLiter(s) Oral Mucosa every 12 hours  chlorhexidine 4% Liquid 1 Application(s) Topical daily  dexMEDEtomidine Infusion 0.2 MICROgram(s)/kG/Hr (3.5 mL/Hr) IV Continuous <Continuous>  heparin   Injectable 5000 Unit(s) SubCutaneous every 8 hours  insulin lispro (ADMELOG) corrective regimen sliding scale   SubCutaneous every 6 hours  lacosamide IVPB 200 milliGRAM(s) IV Intermittent every 12 hours  pantoprazole  Injectable 40 milliGRAM(s) IV Push daily  polyethylene glycol 3350 17 Gram(s) Oral two times a day  senna 2 Tablet(s) Oral at bedtime  sodium chloride 0.9%. 1000 milliLiter(s) (75 mL/Hr) IV Continuous <Continuous>      RESPIRATORY:  Mode: CPAP with PS  FiO2: 30  PEEP: 5  PS: 10    IMAGING:   Recent imaging studies were reviewed.    LAB RESULTS:               11.6   11.18 )-----------( 214      ( 10 Nov 2024 13:23 )             38.3       PT/INR - ( 10 Nov 2024 13:23 )   PT: 11.3 sec;   INR: 0.99 ratio      PTT - ( 10 Nov 2024 13:23 )  PTT:23.0 sec    11-10    138  |  101  |  17  ----------------------------<  124[H]  3.7   |  21[L]  |  0.88    Ca    9.8      10 Nov 2024 13:23    TPro  7.5  /  Alb  3.7  /  TBili  0.9  /  DBili  x   /  AST  39  /  ALT  32  /  AlkPhos  83  11-10    -----------------------------------------------------------------------------------------------------------------------------------------------------------------------------------

## 2024-11-11 NOTE — AIRWAY REMOVAL NOTE  ADULT & PEDS - ARTIFICAL AIRWAY REMOVAL COMMENTS
Written order for extubation verified. The patient was identified by full name and birth date compared to the identification band. Present during the procedure was Campos Mayfield RN. no

## 2024-11-11 NOTE — OCCUPATIONAL THERAPY INITIAL EVALUATION ADULT - ADDITIONAL COMMENTS
As per Pt, lives with son in private home, few steps to enter and main level set up. As per patient, independent with ADLs prior to admission, ambulated independently with no assistive devices. Pt is poor historian, please see care coordination note for more info on PLOF/social history.

## 2024-11-11 NOTE — PHYSICAL THERAPY INITIAL EVALUATION ADULT - GENERAL OBSERVATIONS, REHAB EVAL
Pt received semisupine in bed with +IVL, +bed alarm, +vEEG and +external female catheter. NAD noted.

## 2024-11-11 NOTE — PHYSICAL THERAPY INITIAL EVALUATION ADULT - ADDITIONAL COMMENTS
Pt reports she lives with her son in a house with 0 steps. Pt was independent with ADLs and functional mobility PTA. Pt did not use any DME PTA.

## 2024-11-11 NOTE — OCCUPATIONAL THERAPY INITIAL EVALUATION ADULT - GENERAL OBSERVATIONS, REHAB EVAL
Upon entry, patient semi-supine in bed, family member present at bedside +vEEG +tele +purewick +IV, patient agreeable to OT eval, cleared for OT evaluation as per RN.

## 2024-11-11 NOTE — OCCUPATIONAL THERAPY INITIAL EVALUATION ADULT - PERTINENT HX OF CURRENT PROBLEM, REHAB EVAL
69F with PMH diabetes and glaucoma. Presented to [facility] 11/10 after son found her on the ground this morning presumed seizure-like activity. Patient received a total of 15 mg of Versed during transport from first ED, Patient was intubated in the ED, started on propofol and fentanyl infusions.  Patient received a total of 2500 of IV Keppra at outside hospital.  Patient had a CT of the head that showed "1 cm partially calcified mass along the anterior skull base which may represent a meningioma.  There is mild mass effect on the inferior medial left frontal lobe.  No acute intracranial hemorrhage or midline shift."  Remainder of history is unobtainable as patient is intubated and sedated. Patient transferred to [facility] for further interventions. 69F with PMH diabetes and glaucoma. Presented to [facility] 11/10 after son found her on the ground this morning presumed seizure-like activity. Patient received a total of 15 mg of Versed during transport from first ED, Patient was intubated in the ED, started on propofol and fentanyl infusions.  Patient received a total of 2500 of IV Keppra at outside hospital.  Patient had a CT of the head that showed "1 cm partially calcified mass along the anterior skull base which may represent a meningioma.  There is mild mass effect on the inferior medial left frontal lobe.  No acute intracranial hemorrhage or midline shift."  Remainder of history is unobtainable as patient is intubated and sedated. Patient transferred to [facility] for further interventions.CXR 11/10/24: Right basilar linear atelectasis. Abdomen/Pelvis/Chest CT 11/11/24: Asymmetrically enlarged left adnexa. No acute intrathoracic findings. +VA Duplex BLE Vein Scan 11/11/24: No evidence of deep venous thrombosis in the left lower extremity. Right calf vein thrombosis is detected in the soleal vein

## 2024-11-11 NOTE — OCCUPATIONAL THERAPY INITIAL EVALUATION ADULT - MD ORDER
OT Initial Evaluation  OOB with Assistance Occupational therapy to evaluate and treat.   OOB with Assistance

## 2024-11-11 NOTE — PROGRESS NOTE ADULT - CRITICAL CARE ATTENDING COMMENT
I have personally provided the above noted minutes of critical care time including examining patient, review of laboratory values, imaging, adjusting ventilator, reviewing EEG, interdisciplinary care coordination, and frequent monitoring for decompensation.    Based on my personal evaluation, this patient has a high probability of imminent or life-threatening deterioration due to the presence of: seizures, meningioma, respiratory failure, hypoglycemia -  which required my direct attention, intervention, and personal management. Other billable procedures, if performed, are documented separately.

## 2024-11-11 NOTE — OCCUPATIONAL THERAPY INITIAL EVALUATION ADULT - DIAGNOSIS, OT EVAL
decreased functional activity endurance, decreased strength, Impaired balance, impaired coordination, impaired command follow, impaired cognition, impacting ability to perform ADL and functional mobility.

## 2024-11-11 NOTE — PROVIDER CONTACT NOTE (HYPOGLYCEMIA EVENT) - NS PROVIDER CONTACT BACKGROUND-HYPO
Age: 69y  Past history of DM, Glaucoma. Presented with seizure activity.     Gender: Female    POCT Blood Glucose:  101 mg/dL (11-11-24 @ 02:14)  88 mg/dL (11-11-24 @ 01:38)  91 mg/dL (11-11-24 @ 01:02)  71 mg/dL (11-11-24 @ 00:35)  68 mg/dL (11-11-24 @ 00:33)  91 mg/dL (11-10-24 @ 17:25)  115 mg/dL (11-10-24 @ 13:06)      eMAR:  dextrose 50% Injectable   12.5 milliLiter(s) IV Push (11-11-24 @ 01:49)    dextrose 50% Injectable   12.5 milliLiter(s) IV Push (11-11-24 @ 00:45)

## 2024-11-11 NOTE — PROGRESS NOTE ADULT - SUBJECTIVE AND OBJECTIVE BOX
Patient seen and examined at bedside.    --Anticoagulation--  heparin   Injectable 5000 Unit(s) SubCutaneous every 8 hours    T(C): 37.2 (11-10-24 @ 23:00), Max: 37.9 (11-10-24 @ 19:00)  HR: 63 (11-11-24 @ 01:00) (63 - 90)  BP: 124/59 (11-11-24 @ 01:00) (101/52 - 160/75)  RR: 23 (11-11-24 @ 01:00) (16 - 23)  SpO2: 100% (11-11-24 @ 01:00) (100% - 100%)  Wt(kg): --    Exam: Intubated, no EO, PERRL, R-side WD, L-side spont AG

## 2024-11-11 NOTE — PHYSICAL THERAPY INITIAL EVALUATION ADULT - PERTINENT HX OF CURRENT PROBLEM, REHAB EVAL
Pt is a 69F with PMH diabetes and glaucoma. Presented to [facility] 11/10 after son found her on the ground this morning presumed seizure-like activity. Patient received a total of 15 mg of Versed during transport from first ED, Patient was intubated in the ED, started on propofol and fentanyl infusions.  Patient received a total of 2500 of IV Keppra at outside hospital.  Patient had a CT of the head that showed "1 cm partially calcified mass along the anterior skull base which may represent a meningioma.  There is mild mass effect on the inferior medial left frontal lobe.  No acute intracranial hemorrhage or midline shift."  Remainder of history is unobtainable as patient is intubated and sedated. Patient transferred to [facility] for further interventions. CXR 11/10/24: Right basilar linear atelectasis. Pt is a 69F with PMH diabetes and glaucoma. Presented to [facility] 11/10 after son found her on the ground this morning presumed seizure-like activity. Patient received a total of 15 mg of Versed during transport from first ED, Patient was intubated in the ED, started on propofol and fentanyl infusions.  Patient received a total of 2500 of IV Keppra at outside hospital.  Patient had a CT of the head that showed "1 cm partially calcified mass along the anterior skull base which may represent a meningioma.  There is mild mass effect on the inferior medial left frontal lobe.  No acute intracranial hemorrhage or midline shift."  Remainder of history is unobtainable as patient is intubated and sedated. Patient transferred to [facility] for further interventions. CXR 11/10/24: Right basilar linear atelectasis. Abdomen/Pelvis/Chest CT 11/11/24: Asymmetrically enlarged left adnexa. No acute intrathoracic findings. +VA Duplex BLE Vein Scan 11/11/24: No evidence of deep venous thrombosis in the left lower extremity. Right calf vein thrombosis is detected in the soleal vein.

## 2024-11-11 NOTE — OCCUPATIONAL THERAPY INITIAL EVALUATION ADULT - LEVEL OF INDEPENDENCE: BED TO CHAIR, REHAB EVAL
steps at bedside, Pt requires max cues to participate and declining further mobility/minimum assist (75% patients effort)/moderate assist (50% patients effort)

## 2024-11-11 NOTE — PROVIDER CONTACT NOTE (HYPOGLYCEMIA EVENT) - NS PROVIDER CONTACT ASSESS-HYPO
Pt sedated, A&Ox0, full mechanical vent support, moving left side spontaneously, right side withdrawal to noxious stimuli.  Pt NPO except meds as per order. During blood sugar monitoring, pt Hypoglycemic FS 68mg/dl. Neurostatus unchanged at the time, IRWIN Laura notified.

## 2024-11-11 NOTE — CHART NOTE - NSCHARTNOTEFT_GEN_A_CORE
Spoke with and signed out pt to Yasmin Ornelas PA-C on EMU unit  at 7:15pm . Pt was accepted under Dr. López. IRWIN Mederos was notified of the patient's hospital course and any complications during their hospital stay.

## 2024-11-12 LAB
-  AMOXICILLIN/CLAVULANIC ACID: SIGNIFICANT CHANGE UP
-  AMPICILLIN/SULBACTAM: SIGNIFICANT CHANGE UP
-  AMPICILLIN: SIGNIFICANT CHANGE UP
-  AZTREONAM: SIGNIFICANT CHANGE UP
-  CEFAZOLIN: SIGNIFICANT CHANGE UP
-  CEFEPIME: SIGNIFICANT CHANGE UP
-  CEFOXITIN: SIGNIFICANT CHANGE UP
-  CEFTRIAXONE: SIGNIFICANT CHANGE UP
-  CEFUROXIME: SIGNIFICANT CHANGE UP
-  CIPROFLOXACIN: SIGNIFICANT CHANGE UP
-  ERTAPENEM: SIGNIFICANT CHANGE UP
-  GENTAMICIN: SIGNIFICANT CHANGE UP
-  IMIPENEM: SIGNIFICANT CHANGE UP
-  LEVOFLOXACIN: SIGNIFICANT CHANGE UP
-  MEROPENEM: SIGNIFICANT CHANGE UP
-  NITROFURANTOIN: SIGNIFICANT CHANGE UP
-  PIPERACILLIN/TAZOBACTAM: SIGNIFICANT CHANGE UP
-  TOBRAMYCIN: SIGNIFICANT CHANGE UP
-  TRIMETHOPRIM/SULFAMETHOXAZOLE: SIGNIFICANT CHANGE UP
ANION GAP SERPL CALC-SCNC: 11 MMOL/L — SIGNIFICANT CHANGE UP (ref 5–17)
ANION GAP SERPL CALC-SCNC: 14 MMOL/L — SIGNIFICANT CHANGE UP (ref 5–17)
BUN SERPL-MCNC: 11 MG/DL — SIGNIFICANT CHANGE UP (ref 7–23)
BUN SERPL-MCNC: 13 MG/DL — SIGNIFICANT CHANGE UP (ref 7–23)
CALCIUM SERPL-MCNC: 9.2 MG/DL — SIGNIFICANT CHANGE UP (ref 8.4–10.5)
CALCIUM SERPL-MCNC: 9.2 MG/DL — SIGNIFICANT CHANGE UP (ref 8.4–10.5)
CHLORIDE SERPL-SCNC: 105 MMOL/L — SIGNIFICANT CHANGE UP (ref 96–108)
CHLORIDE SERPL-SCNC: 106 MMOL/L — SIGNIFICANT CHANGE UP (ref 96–108)
CO2 SERPL-SCNC: 25 MMOL/L — SIGNIFICANT CHANGE UP (ref 22–31)
CO2 SERPL-SCNC: 26 MMOL/L — SIGNIFICANT CHANGE UP (ref 22–31)
CREAT SERPL-MCNC: 0.82 MG/DL — SIGNIFICANT CHANGE UP (ref 0.5–1.3)
CREAT SERPL-MCNC: 0.9 MG/DL — SIGNIFICANT CHANGE UP (ref 0.5–1.3)
CULTURE RESULTS: ABNORMAL
EGFR: 69 ML/MIN/1.73M2 — SIGNIFICANT CHANGE UP
EGFR: 77 ML/MIN/1.73M2 — SIGNIFICANT CHANGE UP
GLUCOSE BLDC GLUCOMTR-MCNC: 135 MG/DL — HIGH (ref 70–99)
GLUCOSE BLDC GLUCOMTR-MCNC: 137 MG/DL — HIGH (ref 70–99)
GLUCOSE BLDC GLUCOMTR-MCNC: 152 MG/DL — HIGH (ref 70–99)
GLUCOSE BLDC GLUCOMTR-MCNC: 227 MG/DL — HIGH (ref 70–99)
GLUCOSE BLDC GLUCOMTR-MCNC: 259 MG/DL — HIGH (ref 70–99)
GLUCOSE SERPL-MCNC: 130 MG/DL — HIGH (ref 70–99)
GLUCOSE SERPL-MCNC: 174 MG/DL — HIGH (ref 70–99)
HCT VFR BLD CALC: 35.4 % — SIGNIFICANT CHANGE UP (ref 34.5–45)
HGB BLD-MCNC: 11 G/DL — LOW (ref 11.5–15.5)
INSULIN SERPL-MCNC: 15.6 UU/ML — SIGNIFICANT CHANGE UP (ref 2.6–24.9)
MAGNESIUM SERPL-MCNC: 2 MG/DL — SIGNIFICANT CHANGE UP (ref 1.6–2.6)
MCHC RBC-ENTMCNC: 23.4 PG — LOW (ref 27–34)
MCHC RBC-ENTMCNC: 31.1 G/DL — LOW (ref 32–36)
MCV RBC AUTO: 75.2 FL — LOW (ref 80–100)
METHOD TYPE: SIGNIFICANT CHANGE UP
NRBC # BLD: 0 /100 WBCS — SIGNIFICANT CHANGE UP (ref 0–0)
ORGANISM # SPEC MICROSCOPIC CNT: ABNORMAL
ORGANISM # SPEC MICROSCOPIC CNT: ABNORMAL
PHOSPHATE SERPL-MCNC: 3.2 MG/DL — SIGNIFICANT CHANGE UP (ref 2.5–4.5)
PLATELET # BLD AUTO: 291 K/UL — SIGNIFICANT CHANGE UP (ref 150–400)
POTASSIUM SERPL-MCNC: 3 MMOL/L — LOW (ref 3.5–5.3)
POTASSIUM SERPL-MCNC: 4 MMOL/L — SIGNIFICANT CHANGE UP (ref 3.5–5.3)
POTASSIUM SERPL-SCNC: 3 MMOL/L — LOW (ref 3.5–5.3)
POTASSIUM SERPL-SCNC: 4 MMOL/L — SIGNIFICANT CHANGE UP (ref 3.5–5.3)
RBC # BLD: 4.71 M/UL — SIGNIFICANT CHANGE UP (ref 3.8–5.2)
RBC # FLD: 13.6 % — SIGNIFICANT CHANGE UP (ref 10.3–14.5)
SODIUM SERPL-SCNC: 143 MMOL/L — SIGNIFICANT CHANGE UP (ref 135–145)
SODIUM SERPL-SCNC: 144 MMOL/L — SIGNIFICANT CHANGE UP (ref 135–145)
SPECIMEN SOURCE: SIGNIFICANT CHANGE UP
WBC # BLD: 8.93 K/UL — SIGNIFICANT CHANGE UP (ref 3.8–10.5)
WBC # FLD AUTO: 8.93 K/UL — SIGNIFICANT CHANGE UP (ref 3.8–10.5)

## 2024-11-12 PROCEDURE — 99222 1ST HOSP IP/OBS MODERATE 55: CPT

## 2024-11-12 PROCEDURE — 70553 MRI BRAIN STEM W/O & W/DYE: CPT | Mod: 26

## 2024-11-12 PROCEDURE — 95720 EEG PHY/QHP EA INCR W/VEEG: CPT

## 2024-11-12 PROCEDURE — 99223 1ST HOSP IP/OBS HIGH 75: CPT

## 2024-11-12 RX ORDER — POTASSIUM CHLORIDE 600 MG/1
10 TABLET, EXTENDED RELEASE ORAL
Refills: 0 | Status: COMPLETED | OUTPATIENT
Start: 2024-11-12 | End: 2024-11-12

## 2024-11-12 RX ORDER — GLUCAGON INJECTION, SOLUTION 0.5 MG/.1ML
1 INJECTION, SOLUTION SUBCUTANEOUS ONCE
Refills: 0 | Status: DISCONTINUED | OUTPATIENT
Start: 2024-11-12 | End: 2024-11-18

## 2024-11-12 RX ORDER — POTASSIUM CHLORIDE 600 MG/1
40 TABLET, EXTENDED RELEASE ORAL ONCE
Refills: 0 | Status: COMPLETED | OUTPATIENT
Start: 2024-11-12 | End: 2024-11-12

## 2024-11-12 RX ORDER — 0.9 % SODIUM CHLORIDE 0.9 %
1000 INTRAVENOUS SOLUTION INTRAVENOUS
Refills: 0 | Status: DISCONTINUED | OUTPATIENT
Start: 2024-11-12 | End: 2024-11-18

## 2024-11-12 RX ORDER — LACOSAMIDE 10 MG/ML
100 INJECTION INTRAVENOUS
Refills: 0 | Status: DISCONTINUED | OUTPATIENT
Start: 2024-11-12 | End: 2024-11-18

## 2024-11-12 RX ADMIN — LACOSAMIDE 100 MILLIGRAM(S): 10 INJECTION INTRAVENOUS at 17:43

## 2024-11-12 RX ADMIN — Medication 6: at 16:39

## 2024-11-12 RX ADMIN — Medication 2 TABLET(S): at 21:41

## 2024-11-12 RX ADMIN — POTASSIUM CHLORIDE 100 MILLIEQUIVALENT(S): 600 TABLET, EXTENDED RELEASE ORAL at 12:12

## 2024-11-12 RX ADMIN — POTASSIUM CHLORIDE 40 MILLIEQUIVALENT(S): 600 TABLET, EXTENDED RELEASE ORAL at 12:37

## 2024-11-12 RX ADMIN — Medication 5000 UNIT(S): at 13:04

## 2024-11-12 RX ADMIN — LACOSAMIDE 200 MILLIGRAM(S): 10 INJECTION INTRAVENOUS at 05:08

## 2024-11-12 RX ADMIN — POLYETHYLENE GLYCOL 3350 17 GRAM(S): 17 POWDER, FOR SOLUTION ORAL at 05:08

## 2024-11-12 RX ADMIN — Medication 100 MILLIGRAM(S): at 21:42

## 2024-11-12 RX ADMIN — Medication 4: at 13:01

## 2024-11-12 RX ADMIN — Medication 1 APPLICATION(S): at 18:15

## 2024-11-12 RX ADMIN — Medication 5000 UNIT(S): at 05:08

## 2024-11-12 RX ADMIN — POTASSIUM CHLORIDE 100 MILLIEQUIVALENT(S): 600 TABLET, EXTENDED RELEASE ORAL at 16:31

## 2024-11-12 RX ADMIN — Medication 1 APPLICATION(S): at 05:08

## 2024-11-12 RX ADMIN — PANTOPRAZOLE SODIUM 40 MILLIGRAM(S): 40 TABLET, DELAYED RELEASE ORAL at 12:54

## 2024-11-12 RX ADMIN — POTASSIUM CHLORIDE 100 MILLIEQUIVALENT(S): 600 TABLET, EXTENDED RELEASE ORAL at 12:53

## 2024-11-12 RX ADMIN — Medication 5000 UNIT(S): at 21:41

## 2024-11-12 NOTE — PROGRESS NOTE ADULT - SUBJECTIVE AND OBJECTIVE BOX
Patient is a 69y old  Female who presents with a chief complaint of Admitted 11/10/24 for Seizures (12 Nov 2024 06:33)    HPI:  69F with PMH diabetes and glaucoma. Presented to [facility] 11/10 after son found her on the ground this morning presumed seizure-like activity. Patient received a total of 15 mg of Versed during transport from first ED, Patient was intubated in the ED, started on propofol and fentanyl infusions.  Patient received a total of 2500 of IV Keppra at outside hospital.  Patient had a CT of the head that showed "1 cm partially calcified mass along the anterior skull base which may represent a meningioma.  There is mild mass effect on the inferior medial left frontal lobe.  No acute intracranial hemorrhage or midline shift."  Remainder of history is unobtainable as patient is intubated and sedated. Patient transferred to [facility] for further interventions.    Interval history: Downgraded to EMU. Mri and pelvic USG awaited.    Subjective: Patient evaluated at bedside.       Vital Signs Last 24 Hrs  T(C): 36.7 (12 Nov 2024 08:42), Max: 37.6 (11 Nov 2024 15:00)  T(F): 98 (12 Nov 2024 08:42), Max: 99.7 (11 Nov 2024 15:00)  HR: 77 (12 Nov 2024 04:41) (65 - 79)  BP: 145/65 (12 Nov 2024 08:42) (113/75 - 161/72)  BP(mean): 96 (11 Nov 2024 18:00) (91 - 104)  RR: 18 (12 Nov 2024 08:42) (18 - 23)  SpO2: 98% (12 Nov 2024 08:42) (95% - 100%)    Parameters below as of 12 Nov 2024 08:42  Patient On (Oxygen Delivery Method): room air          Physical Exam:  Constitutional: NAD, lying in bed  Neuro  * Mental Status:  GCS 15: Awake, alert, oriented to conversation. No aphasia or difficulty speaking. No dysarthria. Able to name objects and their function.  * Cranial Nerves: Cnii-Cnxii grossly intact. PERRL, EOMI, tongue midline, no gaze deviation  * Motor: RUE 5/5, LUE 5/5, RLE 5/5, LLE 5/5, no drift or dysmetria  * Sensory: Sensation intact to light touch  * Reflexes: not assessed   Cardiovascular: Regular rate and rhythm.  Eyes: See neurologic examination with detailed examination of eyes.  ENT: No JVD, Trachea Midline  Respiratory: non labored breathing   Gastrointestinal: Soft, nontender, nondistended.  Genitourinary: [ ] Land, [ x ] No Land.   Musculoskeletal: No muscle wasting noted, (See neurologic assessment for full muscle strength assessment) No pretibial edema appreciated, no appreciable calf tenderness.  Skin:  no wounds, no redness, no abrasions noted  Hematologic / Lymph / Immunologic: No bleeding from IV sites or wounds, No lymphadenopathy, No Hives or allergic type skin lesions      LABS:                        11.0   8.93  )-----------( 291      ( 12 Nov 2024 05:55 )             35.4     11-12    144  |  105  |  11  ----------------------------<  130[H]  3.0[L]   |  25  |  0.82    Ca    9.2      12 Nov 2024 05:55  Phos  3.2     11-12  Mg     2.0     11-12    TPro  4.3[L]  /  Alb  2.0[L]  /  TBili  0.5  /  DBili  0.2  /  AST  17  /  ALT  17  /  AlkPhos  43  11-11    PT/INR - ( 10 Nov 2024 13:23 )   PT: 11.3 sec;   INR: 0.99 ratio         PTT - ( 10 Nov 2024 13:23 )  PTT:23.0 sec  Urinalysis Basic - ( 12 Nov 2024 05:55 )    Color: x / Appearance: x / SG: x / pH: x  Gluc: 130 mg/dL / Ketone: x  / Bili: x / Urobili: x   Blood: x / Protein: x / Nitrite: x   Leuk Esterase: x / RBC: x / WBC x   Sq Epi: x / Non Sq Epi: x / Bacteria: x        CULTURES:  Culture Results:   >100,000 CFU/ml Escherichia coli *!* (11-10 @ 13:39)        I&O:       Medications:    RADIOLOGY & ADDITIONAL STUDIES: Patient is a 69y old  Female who presents with a chief complaint of Admitted 11/10/24 for Seizures (12 Nov 2024 06:33)    HPI:  69F with PMH diabetes and glaucoma. Presented to [facility] 11/10 after son found her on the ground this morning presumed seizure-like activity. Patient received a total of 15 mg of Versed during transport from first ED, Patient was intubated in the ED, started on propofol and fentanyl infusions.  Patient received a total of 2500 of IV Keppra at outside hospital.  Patient had a CT of the head that showed "1 cm partially calcified mass along the anterior skull base which may represent a meningioma.  There is mild mass effect on the inferior medial left frontal lobe.  No acute intracranial hemorrhage or midline shift."  Remainder of history is unobtainable as patient is intubated and sedated. Patient transferred to [facility] for further interventions.    Interval history: Downgraded to EMU. Mri and pelvic USG awaited.    Subjective: Patient evaluated at bedside.       Vital Signs Last 24 Hrs  T(C): 36.7 (12 Nov 2024 08:42), Max: 37.6 (11 Nov 2024 15:00)  T(F): 98 (12 Nov 2024 08:42), Max: 99.7 (11 Nov 2024 15:00)  HR: 77 (12 Nov 2024 04:41) (65 - 79)  BP: 145/65 (12 Nov 2024 08:42) (113/75 - 161/72)  BP(mean): 96 (11 Nov 2024 18:00) (91 - 104)  RR: 18 (12 Nov 2024 08:42) (18 - 23)  SpO2: 98% (12 Nov 2024 08:42) (95% - 100%)    Parameters below as of 12 Nov 2024 08:42  Patient On (Oxygen Delivery Method): room air          Physical Exam:  Constitutional: NAD, lying in bed  Neuro  * Mental Status:  GCS 15: Awake, alert, oriented to conversation. No aphasia or difficulty speaking. No dysarthria. Able to name objects and their function.  * Cranial Nerves: Cnii-Cnxii grossly intact. PERRL, EOMI, tongue midline, no gaze deviation  * Motor: RUE 5/5, LUE 5/5, RLE 5/5, LLE 5/5, no drift or dysmetria  * Sensory: Sensation intact to light touch  * Reflexes: not assessed   Cardiovascular: Regular rate and rhythm.  Eyes: See neurologic examination with detailed examination of eyes.  ENT: No JVD, Trachea Midline  Respiratory: non labored breathing   Gastrointestinal: Soft, nontender, nondistended.  Genitourinary: [ ] Land, [ x ] No Land.   Musculoskeletal: No muscle wasting noted, (See neurologic assessment for full muscle strength assessment) No pretibial edema appreciated, no appreciable calf tenderness.  Skin:  no wounds, no redness, no abrasions noted  Hematologic / Lymph / Immunologic: No bleeding from IV sites or wounds, No lymphadenopathy, No Hives or allergic type skin lesions      LABS:                        11.0   8.93  )-----------( 291      ( 12 Nov 2024 05:55 )             35.4     11-12    144  |  105  |  11  ----------------------------<  130[H]  3.0[L]   |  25  |  0.82    Ca    9.2      12 Nov 2024 05:55  Phos  3.2     11-12  Mg     2.0     11-12    TPro  4.3[L]  /  Alb  2.0[L]  /  TBili  0.5  /  DBili  0.2  /  AST  17  /  ALT  17  /  AlkPhos  43  11-11    PT/INR - ( 10 Nov 2024 13:23 )   PT: 11.3 sec;   INR: 0.99 ratio         PTT - ( 10 Nov 2024 13:23 )  PTT:23.0 sec  Urinalysis Basic - ( 12 Nov 2024 05:55 )    Color: x / Appearance: x / SG: x / pH: x  Gluc: 130 mg/dL / Ketone: x  / Bili: x / Urobili: x   Blood: x / Protein: x / Nitrite: x   Leuk Esterase: x / RBC: x / WBC x   Sq Epi: x / Non Sq Epi: x / Bacteria: x        CULTURES:  Culture Results:   >100,000 CFU/ml Escherichia coli *!* (11-10 @ 13:39)        I&O:       Medications:    RADIOLOGY & ADDITIONAL STUDIES:    EEG 11/12  1- risk of focal seizures arising from left frontal region  2- Focal cerebral dysfunction or structural abnormality in the left hemisphere region.  3- Moderate diffuse cerebral dysfunction that is not specific in etiology or at least partly due to medication Patient is a 69y old  Female who presents with a chief complaint of Admitted 11/10/24 for Seizures (12 Nov 2024 06:33)    HPI:  69F with PMH diabetes and glaucoma. Presented to [facility] 11/10 after son found her on the ground this morning presumed seizure-like activity. Patient received a total of 15 mg of Versed during transport from first ED, Patient was intubated in the ED, started on propofol and fentanyl infusions.  Patient received a total of 2500 of IV Keppra at outside hospital.  Patient had a CT of the head that showed "1 cm partially calcified mass along the anterior skull base which may represent a meningioma.  There is mild mass effect on the inferior medial left frontal lobe.  No acute intracranial hemorrhage or midline shift."  Remainder of history is unobtainable as patient is intubated and sedated. Patient transferred to [facility] for further interventions.    Interval history: Downgraded to EMU. Mri and pelvic USG awaited.    Subjective: Patient evaluated at bedside. No acute events overnight.      Vital Signs Last 24 Hrs  T(C): 36.7 (12 Nov 2024 08:42), Max: 37.6 (11 Nov 2024 15:00)  T(F): 98 (12 Nov 2024 08:42), Max: 99.7 (11 Nov 2024 15:00)  HR: 77 (12 Nov 2024 04:41) (65 - 79)  BP: 145/65 (12 Nov 2024 08:42) (113/75 - 161/72)  BP(mean): 96 (11 Nov 2024 18:00) (91 - 104)  RR: 18 (12 Nov 2024 08:42) (18 - 23)  SpO2: 98% (12 Nov 2024 08:42) (95% - 100%)    Parameters below as of 12 Nov 2024 08:42  Patient On (Oxygen Delivery Method): room air          Physical Exam:  Constitutional: NAD, lying in bed  Neuro  * Mental Status:  Awake, alert, oriented to place, person and time. No difficulty speaking.   * Cranial Nerves: Cnii-Cnxii grossly intact. PERRL, EOMI, tongue midline, no gaze deviation  * Motor: RUE 4/5, LUE 4/5, LL promixmally and distally, no drift or dysmetria  * Sensory: Sensation intact to light touch  * Reflexes: not assessed   Cardiovascular: Regular rate and rhythm.  Eyes: See neurologic examination with detailed examination of eyes.  ENT: No JVD, Trachea Midline  Respiratory: non labored breathing     LABS:                        11.0   8.93  )-----------( 291      ( 12 Nov 2024 05:55 )             35.4     11-12    144  |  105  |  11  ----------------------------<  130[H]  3.0[L]   |  25  |  0.82    Ca    9.2      12 Nov 2024 05:55  Phos  3.2     11-12  Mg     2.0     11-12    TPro  4.3[L]  /  Alb  2.0[L]  /  TBili  0.5  /  DBili  0.2  /  AST  17  /  ALT  17  /  AlkPhos  43  11-11    PT/INR - ( 10 Nov 2024 13:23 )   PT: 11.3 sec;   INR: 0.99 ratio         PTT - ( 10 Nov 2024 13:23 )  PTT:23.0 sec  Urinalysis Basic - ( 12 Nov 2024 05:55 )    Color: x / Appearance: x / SG: x / pH: x  Gluc: 130 mg/dL / Ketone: x  / Bili: x / Urobili: x   Blood: x / Protein: x / Nitrite: x   Leuk Esterase: x / RBC: x / WBC x   Sq Epi: x / Non Sq Epi: x / Bacteria: x        CULTURES:  Culture Results:   >100,000 CFU/ml Escherichia coli *!* (11-10 @ 13:39)        I&O:       Medications:    RADIOLOGY & ADDITIONAL STUDIES:    EEG 11/12  1- risk of focal seizures arising from left frontal region  2- Focal cerebral dysfunction or structural abnormality in the left hemisphere region.  3- Moderate diffuse cerebral dysfunction that is not specific in etiology or at least partly due to medication

## 2024-11-12 NOTE — CONSULT NOTE ADULT - SUBJECTIVE AND OBJECTIVE BOX
Vascular Cardiology Consult Note     DIRECT PROVIDER NUMBER: [phone]  / Available on TEAMS    CC: Seizures      HPI: 70 y/o F PMHx HTN, DM, glaucoma presents as transfer from [facility] due to seizures. Transferred to [facility]. LE venous duplex 11/11 showed R soleal vein DVT. CT chest / abdomen / pelvis with contrast: No PE noted. Asymmetrically enlarged left adnexa. TTE showed normal RV and LV function. Denies C/P or SOB. No LE pain or edema. No known prior VTE. Vascular Cardiology consulted.     MEDICATIONS:  heparin   Injectable 5000 Unit(s) SubCutaneous every 8 hours  cefTRIAXone   IVPB 1000 milliGRAM(s) IV Intermittent every 24 hours  acetaminophen     Tablet .. 650 milliGRAM(s) Oral every 6 hours PRN  lacosamide 100 milliGRAM(s) Oral <User Schedule>  ondansetron Injectable 4 milliGRAM(s) IV Push every 6 hours PRN  pantoprazole  Injectable 40 milliGRAM(s) IV Push daily  polyethylene glycol 3350 17 Gram(s) Oral two times a day  senna 2 Tablet(s) Oral at bedtime  insulin lispro (ADMELOG) corrective regimen sliding scale   SubCutaneous every 6 hours  mupirocin 2% Nasal 1 Application(s) Both Nostrils every 12 hours  potassium chloride   Powder 40 milliEquivalent(s) Oral once  potassium chloride  10 mEq/100 mL IVPB 10 milliEquivalent(s) IV Intermittent every 1 hour    PAST MEDICAL & SURGICAL HISTORY:  Diabetes mellitus  Glaucoma    FAMILY HISTORY: see HPI    SOCIAL HISTORY:  unchanged    REVIEW OF SYSTEMS:  CONSTITUTIONAL: No fever  EYES: No eye pain  ENT:  No throat pain  NECK: No pain   RESPIRATORY: No SOB   CARDIOVASCULAR:  No C/P  GASTROINTESTINAL: No abdominal pain  GENITOURINARY: No hematuria  SKIN: No LE wounds  LYMPH Nodes: No enlarged glands noted  ENDOCRINE: No heat or cold intolerance noted  MUSCULOSKELETAL: No LE edema  PSYCHIATRIC: No depression, anxiety  HEME/LYMPH: No bleeding gums  ALLERGY AND IMMUNOLOGIC: No hives    [ x] All others negative	    PHYSICAL EXAM:  T(C): 37.2 (11-12-24 @ 12:39), Max: 37.6 (11-11-24 @ 15:00)  HR: 66 (11-12-24 @ 12:39) (66 - 77)  BP: 157/74 (11-12-24 @ 12:39) (113/75 - 161/72)  RR: 18 (11-12-24 @ 12:39) (18 - 23)  SpO2: 98% (11-12-24 @ 12:39) (95% - 98%)  Wt(kg): --  I&O's Summary    11 Nov 2024 07:01  -  12 Nov 2024 07:00  --------------------------------------------------------  IN: 885.8 mL / OUT: 2050 mL / NET: -1164.2 mL    12 Nov 2024 07:01  -  12 Nov 2024 14:27  --------------------------------------------------------  IN: 230 mL / OUT: 0 mL / NET: 230 mL    Appearance: NAD	  Cardiovascular: RRR, S1 and S2  Respiratory: CTA B/L  Gastrointestinal:  Soft, Non-tender, + BS	  Skin: No cyanosis	  Extremities: No LE edema, bilateral calves soft     LABS:	 	    CBC Full  -  ( 12 Nov 2024 05:55 )  WBC Count : 8.93 K/uL  Hemoglobin : 11.0 g/dL  Hematocrit : 35.4 %  Platelet Count - Automated : 291 K/uL  Mean Cell Volume : 75.2 fl  Mean Cell Hemoglobin : 23.4 pg  Mean Cell Hemoglobin Concentration : 31.1 g/dL  Auto Neutrophil # : x  Auto Lymphocyte # : x  Auto Monocyte # : x  Auto Eosinophil # : x  Auto Basophil # : x  Auto Neutrophil % : x  Auto Lymphocyte % : x  Auto Monocyte % : x  Auto Eosinophil % : x  Auto Basophil % : x    11-12    144  |  105  |  11  ----------------------------<  130[H]  3.0[L]   |  25  |  0.82    Ca    9.2      12 Nov 2024 05:55  Phos  3.2     11-12  Mg     2.0     11-12    TPro  4.3[L]  /  Alb  2.0[L]  /  TBili  0.5  /  DBili  0.2  /  AST  17  /  ALT  17  /  AlkPhos  43  11-11      Assessment:  1. R Soleal DVT  2. Seizures  3. Enlarged left adnexa    Plan:  1. Currently on Heparin Subcutaneous TID.  2. Recommend repeat surveillance duplex on 11/15.  3. Pneumatic compression to L lower extremity.  4. TTE showed normal RV function.  6. Patient hemodynamically stable on RA. No cardiopulmonary symptoms.  7. CT chest, abdomen, pelvis reviewed: No PE noted.   8. Will need outpatient GYN follow-up for asymmetrically enlarged left adnexa.  9. Once no further testing is needed, recommend transition to Eliquis 2.5 mg BID (low dose for continued DVT PPX).    Paired with outpatient surveillance venous duplex.   10. Appreciate excellent Neurosurgical care.     Thank you  IRWIN Saldaña, MS, [facility]  Vascular Cardiology Service    Please call with any questions:   DIRECT SERVICE NUMBER: [phone] / Available on TEAMS

## 2024-11-12 NOTE — PROGRESS NOTE ADULT - ASSESSMENT
69F PMHx HTN, DM, glaucoma presents as transfer from [facility] due to seizures. Had 30 minutes of consecutive seizure activity, broke after Versed. Received Keppra 2500 mg total, intubated and sedated at time of arrival. Loaded with Vimpat 400 mg in NSCU. On exam, patient is intubated s/p propofol, currently on sedation with precedex. Not arousable, does not follow commands, left gaze preference.   CT head remarkable for 1 cm lesion with mass effect. UA positive for UTI.    Impression: New onset seizures (generalized vs focal to bilateral tonic clonic) with status epilepticus.    Recommendations:  [] Continue Vimpat 200 mg BID  [] Rescue medications: 1st line- Lorazepam 1 mg IV push for seizures lasting >3 minutes with vital sign changes. 2nd line- Briviact 100 mg IV push for seizures lasting >3 minutes refractory to lorazepam.  [] Continue vEEG monitoring  [] MRI brain w/ and w/o contrast Epilepsy protocol  [] Pelvic USG  [] IV ceftriaxone for E coli UTI  [] Follow up NSGY recommendations for brain mass  [] Vital signs and neurological checks q2h  [] Seizure, fall and aspiration precautions. Avoid sleep deprivation.       Discussed with attending Dr. López. Case and plan not finalized until attending attestation 69F PMHx HTN, DM, glaucoma presents as transfer from [facility] due to seizures. Had 30 minutes of consecutive seizure activity, broke after Versed. Received Keppra 2500 mg total, intubated and sedated at time of arrival. Loaded with Vimpat 400 mg in NSCU. On exam, patient is intubated s/p propofol, currently on sedation with precedex. Not arousable, does not follow commands, left gaze preference.   CT head remarkable for 1 cm lesion with mass effect. UA positive for UTI.    Impression: New onset seizures focal seizures with seondary generalisation s/p focal status epilepticus.      Recommendations:  [] Reduce Vimpat to 100 mg BID due to moderate cerebral dysfunction on EEG  [] Rescue medications: 1st line- Lorazepam 1 mg IV push for seizures lasting >3 minutes with vital sign changes. 2nd line- Briviact 100 mg IV push for seizures lasting >3 minutes refractory to lorazepam.  [] Continue vEEG monitoring  [] MRI brain w/ and w/o contrast Epilepsy protocol  [] Pelvic USG for evaluation of adnexal mass  [] IV ceftriaxone for E coli UTI  [] Follow up NSGY recommendations for brain mass  [] Vital signs and neurological checks q2h  [] Seizure, fall and aspiration precautions. Avoid sleep deprivation.       Discussed with attending Dr. López.  69F PMHx HTN, DM, glaucoma presents as transfer from [facility] due to seizures. Had 30 minutes of consecutive seizure activity, broke after Versed. Received Keppra 2500 mg total, intubated and sedated at time of arrival. Loaded with Vimpat 400 mg in NSCU. On exam, patient is intubated s/p propofol, currently on sedation with precedex. Not arousable, does not follow commands, left gaze preference. CT head remarkable for 1 cm lesion with mass effect. UA positive for UTI.    Impression: New onset seizures focal seizures with seondary generalisation s/p focal status epilepticus.    Recommendations:  [] Reduce Vimpat to 100 mg BID due to moderate cerebral dysfunction on EEG  [] Rescue medications: 1st line- Lorazepam 1 mg IV push for seizures lasting >3 minutes with vital sign changes. 2nd line- Briviact 100 mg IV push for seizures lasting >3 minutes refractory to lorazepam.  [] Continue vEEG monitoring  [] MRI brain w/ and w/o contrast Epilepsy protocol  [] Pelvic USG for evaluation of adnexal mass  [] IV ceftriaxone for E coli UTI  [] Follow up NSGY recommendations for brain mass  [] Vital signs and neurological checks q2h  [] Seizure, fall and aspiration precautions. Avoid sleep deprivation.       Discussed with attending Dr. López.

## 2024-11-12 NOTE — PROGRESS NOTE ADULT - ASSESSMENT
MR brain stereo w/wo- p      vEEG in progress    Vascular cards consulted for  R soleal DVT    Rpt duplex 11/16    Cont Abx for UTI untril 11/13 (Ecoli)

## 2024-11-13 LAB
GLUCOSE BLDC GLUCOMTR-MCNC: 155 MG/DL — HIGH (ref 70–99)
GLUCOSE BLDC GLUCOMTR-MCNC: 205 MG/DL — HIGH (ref 70–99)
GLUCOSE BLDC GLUCOMTR-MCNC: 207 MG/DL — HIGH (ref 70–99)
GLUCOSE BLDC GLUCOMTR-MCNC: 241 MG/DL — HIGH (ref 70–99)

## 2024-11-13 PROCEDURE — 95720 EEG PHY/QHP EA INCR W/VEEG: CPT

## 2024-11-13 PROCEDURE — 99232 SBSQ HOSP IP/OBS MODERATE 35: CPT

## 2024-11-13 PROCEDURE — 93975 VASCULAR STUDY: CPT | Mod: 26

## 2024-11-13 PROCEDURE — 71275 CT ANGIOGRAPHY CHEST: CPT | Mod: 26

## 2024-11-13 PROCEDURE — 99222 1ST HOSP IP/OBS MODERATE 55: CPT

## 2024-11-13 PROCEDURE — 76856 US EXAM PELVIC COMPLETE: CPT | Mod: 26,59

## 2024-11-13 PROCEDURE — 76830 TRANSVAGINAL US NON-OB: CPT | Mod: 26

## 2024-11-13 PROCEDURE — 99233 SBSQ HOSP IP/OBS HIGH 50: CPT

## 2024-11-13 RX ADMIN — LACOSAMIDE 100 MILLIGRAM(S): 10 INJECTION INTRAVENOUS at 05:46

## 2024-11-13 RX ADMIN — POLYETHYLENE GLYCOL 3350 17 GRAM(S): 17 POWDER, FOR SOLUTION ORAL at 05:45

## 2024-11-13 RX ADMIN — PANTOPRAZOLE SODIUM 40 MILLIGRAM(S): 40 TABLET, DELAYED RELEASE ORAL at 11:50

## 2024-11-13 RX ADMIN — LACOSAMIDE 100 MILLIGRAM(S): 10 INJECTION INTRAVENOUS at 17:22

## 2024-11-13 RX ADMIN — Medication 2: at 11:49

## 2024-11-13 RX ADMIN — Medication 1: at 17:23

## 2024-11-13 RX ADMIN — Medication 2: at 07:44

## 2024-11-13 RX ADMIN — Medication 1 APPLICATION(S): at 05:45

## 2024-11-13 RX ADMIN — Medication 5000 UNIT(S): at 14:52

## 2024-11-13 RX ADMIN — Medication 5000 UNIT(S): at 21:53

## 2024-11-13 RX ADMIN — Medication 5000 UNIT(S): at 05:45

## 2024-11-13 RX ADMIN — Medication 1 APPLICATION(S): at 17:24

## 2024-11-13 NOTE — PROGRESS NOTE ADULT - ASSESSMENT
Assessment and Plan:   · Assessment	  69F PMHx HTN, DM, glaucoma presents as transfer from [facility] due to seizures. Had 30 minutes of consecutive seizure activity, broke after Versed. Received Keppra 2500 mg total, intubated and sedated at time of arrival. Loaded with Vimpat 400 mg in NSCU. On exam, patient is intubated s/p propofol, currently on sedation with precedex. Not arousable, does not follow commands, left gaze preference. CT head remarkable for 1 cm lesion with mass effect. UA positive for UTI.    Impression: New onset seizures focal seizures with seondary generalisation s/p focal status epilepticus. Finding     Recommendations:  [] Reduce Vimpat to 100 mg BID due to moderate cerebral dysfunction on EEG  [] Rescue medications: 1st line- Lorazepam 1 mg IV push for seizures lasting >3 minutes with vital sign changes. 2nd line- Briviact 100 mg IV push for seizures lasting >3 minutes refractory to lorazepam.  [] Continue vEEG monitoring  [] Neurosurgery following given the finding of meningioma, plan for OR, timing to be decided  [] Pelvic USG for evaluation of adnexal mass  [] rt soleal DVT, repeat US 11/15  [] as per vascular CTA chest to r/o PE  [] IV ceftriaxone for E coli UTI  [] Vital signs and neurological checks q2h  [] Seizure, fall and aspiration precautions. Avoid sleep deprivation.  [] dvt PPX with SQH tid and SCD to the left leg only       Discussed with attending Dr. López.

## 2024-11-13 NOTE — CONSULT NOTE ADULT - SUBJECTIVE AND OBJECTIVE BOX
Patient is a 69y old  Female who presents with a chief complaint of Seizures (13 Nov 2024 14:46)    HPI:  69F with PMH diabetes and glaucoma. Presented to [facility] 11/10 after son found her on the ground this morning presumed seizure-like activity. Patient received a total of 15 mg of Versed during transport from first ED, Patient was intubated in the ED, started on propofol and fentanyl infusions.  Patient received a total of 2500 of IV Keppra at outside hospital.  Patient had a CT of the head that showed "1 cm partially calcified mass along the anterior skull base which may represent a meningioma.  There is mild mass effect on the inferior medial left frontal lobe.  No acute intracranial hemorrhage or midline shift."  Remainder of history is unobtainable as patient is intubated and sedated. Patient transferred to [facility] for further interventions.    69yF was admitted on 11-10, seen today, son and nephew at bedside. Patient with no complaints.       REVIEW OF SYSTEMS  Denies chest pain, SOB, N/V, F/C, abdominal pain     VITALS  T(C): 37 (11-13-24 @ 12:13), Max: 37 (11-13-24 @ 08:18)  HR: 72 (11-13-24 @ 12:13) (60 - 77)  BP: 162/81 (11-13-24 @ 12:13) (122/60 - 162/81)  RR: 18 (11-13-24 @ 12:13) (18 - 18)  SpO2: 98% (11-13-24 @ 12:13) (96% - 98%)  Wt(kg): --    PAST MEDICAL & SURGICAL HISTORY  Diabetes mellitus    Glaucoma    No significant past surgical history        FUNCTIONAL HISTORY  Lives with son, no steps  Independent AMB and ADLs PTA     CURRENT FUNCTIONAL STATUS  PT  11/12  bed mobility min assist   transfers min assist x 2  gait min assist x 2, 2 steps     OT 11/12  bed mobility min assist   transfers min assist x 2  dressing min assist     RECENT LABS/IMAGING  CBC Full  -  ( 12 Nov 2024 05:55 )  WBC Count : 8.93 K/uL  RBC Count : 4.71 M/uL  Hemoglobin : 11.0 g/dL  Hematocrit : 35.4 %  Platelet Count - Automated : 291 K/uL  Mean Cell Volume : 75.2 fl  Mean Cell Hemoglobin : 23.4 pg  Mean Cell Hemoglobin Concentration : 31.1 g/dL  Auto Neutrophil # : x  Auto Lymphocyte # : x  Auto Monocyte # : x  Auto Eosinophil # : x  Auto Basophil # : x  Auto Neutrophil % : x  Auto Lymphocyte % : x  Auto Monocyte % : x  Auto Eosinophil % : x  Auto Basophil % : x    11-12    143  |  106  |  13  ----------------------------<  174[H]  4.0   |  26  |  0.90    Ca    9.2      12 Nov 2024 19:33  Phos  3.2     11-12  Mg     2.0     11-12      Urinalysis Basic - ( 12 Nov 2024 19:33 )    Color: x / Appearance: x / SG: x / pH: x  Gluc: 174 mg/dL / Ketone: x  / Bili: x / Urobili: x   Blood: x / Protein: x / Nitrite: x   Leuk Esterase: x / RBC: x / WBC x   Sq Epi: x / Non Sq Epi: x / Bacteria: x    < from: MR Brain Stereotactic w/wo IV Cont (11.12.24 @ 11:46) >    IMPRESSION:  -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 No parenchymal signal abnormality   of the adjacent frontal lobes.    -Mild ventriculomegaly secondary findings raising possibility of normal   pressure hydrocephalus.    < end of copied text >      ALLERGIES  Allergy Status Unknown      MEDICATIONS   acetaminophen     Tablet .. 650 milliGRAM(s) Oral every 6 hours PRN  dextrose 5%. 1000 milliLiter(s) IV Continuous <Continuous>  dextrose 5%. 1000 milliLiter(s) IV Continuous <Continuous>  dextrose 50% Injectable 25 Gram(s) IV Push once  dextrose 50% Injectable 25 Gram(s) IV Push once  dextrose 50% Injectable 12.5 Gram(s) IV Push once  dextrose Oral Gel 15 Gram(s) Oral once PRN  glucagon  Injectable 1 milliGRAM(s) IntraMuscular once  heparin   Injectable 5000 Unit(s) SubCutaneous every 8 hours  insulin lispro (ADMELOG) corrective regimen sliding scale   SubCutaneous three times a day before meals  insulin lispro (ADMELOG) corrective regimen sliding scale   SubCutaneous at bedtime  lacosamide 100 milliGRAM(s) Oral <User Schedule>  mupirocin 2% Nasal 1 Application(s) Both Nostrils every 12 hours  ondansetron Injectable 4 milliGRAM(s) IV Push every 6 hours PRN  pantoprazole  Injectable 40 milliGRAM(s) IV Push daily  polyethylene glycol 3350 17 Gram(s) Oral two times a day  senna 2 Tablet(s) Oral at bedtime      ----------------------------------------------------------------------------------------  PHYSICAL EXAM  Constitutional - NAD, Comfortable, in bed   Chest - Breathing comfortably on room air   Cardiovascular - S1S2   Extremities - No C/C/E, No calf tenderness   Neurologic Exam -   follows commands                 Cognitive - Awake, Alert, AAO to self, place: hospital, month, year     Communication - Fluent, No dysarthria        Motor - moves all ext 5/5      Sensory - Intact to LT     Psychiatric - Mood stable, Affect WNL  ----------------------------------------------------------------------------------------  ASSESSMENT/PLAN  69yFemale h/o HTN, DM with functional deficits after seizure, found to have meningioma  MRI as above, possible resection this admission  on vimpat, EEG monitoring   Rt soleal DVT, on heparin TID   UTI treated with ceftriaxone   Pain - Tylenol  Rehab -    patient requires min assist with mobility    continue bedside therapy while admitted to prevent secondary complications of immobility, bed mobility, transfer training, progressive ambulation, equipment evaluation, ADLs, bracing/splinting   OOB throughout the day with staff, OOB to chair with meals/3 hours daily        Recommend ACUTE inpatient rehabilitation for the functional deficits consisting of 3 hours of multidisciplinary intense therapy/day x 5 days/week x 2-3 weeks depending on progress at rehabilitation facility, 24 hour RN/daily PMR physician for comorbid medical management.      Patient will be able to participate in and benefit from intense rehabilitation therapies for 3 hours a day x 5 days/week to maximize independence.     Rehab recommendations are dependent on functional progress and participation with bedside therapy     Will continue to follow for ongoing rehab needs and recommendations.       55 minutes spent, reviewing hospital course, therapy notes, relevant imaging, exam, education about inpatient rehabilitation, documentation, and discussion with  and rehabilitation team   You can access the FollowMyHealth Patient Portal offered by [facility] by registering at the following website: [email or web address]. By joining [facility]’s FollowMyHealth portal, you will also be able to view your health information using other applications (apps) compatible with our system.

## 2024-11-13 NOTE — PROGRESS NOTE ADULT - SUBJECTIVE AND OBJECTIVE BOX
Vascular Cardiology Progress Note     DIRECT PROVIDER NUMBER: [phone]  / Available on TEAMS    CC: Seizures      Interval Events:  No C/P or SOB.  No LE pain or edema.     MEDICATIONS  (STANDING):  dextrose 5%. 1000 milliLiter(s) (100 mL/Hr) IV Continuous <Continuous>  dextrose 5%. 1000 milliLiter(s) (50 mL/Hr) IV Continuous <Continuous>  dextrose 50% Injectable 25 Gram(s) IV Push once  dextrose 50% Injectable 25 Gram(s) IV Push once  dextrose 50% Injectable 12.5 Gram(s) IV Push once  glucagon  Injectable 1 milliGRAM(s) IntraMuscular once  heparin   Injectable 5000 Unit(s) SubCutaneous every 8 hours  insulin lispro (ADMELOG) corrective regimen sliding scale   SubCutaneous at bedtime  insulin lispro (ADMELOG) corrective regimen sliding scale   SubCutaneous three times a day before meals  lacosamide 100 milliGRAM(s) Oral <User Schedule>  mupirocin 2% Nasal 1 Application(s) Both Nostrils every 12 hours  pantoprazole  Injectable 40 milliGRAM(s) IV Push daily  polyethylene glycol 3350 17 Gram(s) Oral two times a day  senna 2 Tablet(s) Oral at bedtime    PAST MEDICAL & SURGICAL HISTORY:  Diabetes mellitus  Glaucoma    FAMILY HISTORY: see HPI    SOCIAL HISTORY:  unchanged    REVIEW OF SYSTEMS:  CONSTITUTIONAL: No fever  EYES: No eye pain  ENT:  No throat pain  NECK: No pain   RESPIRATORY: No SOB   CARDIOVASCULAR:  No C/P  GASTROINTESTINAL: No abdominal pain  GENITOURINARY: No hematuria  SKIN: No LE wounds  LYMPH Nodes: No enlarged glands noted  ENDOCRINE: No heat or cold intolerance noted  MUSCULOSKELETAL: No LE edema  PSYCHIATRIC: No depression, anxiety  HEME/LYMPH: No bleeding gums  ALLERGY AND IMMUNOLOGIC: No hives    [ x] All others negative	    PHYSICAL EXAM:  Vital Signs Last 24 Hrs  T(C): 37 (13 Nov 2024 12:13), Max: 37 (13 Nov 2024 08:18)  T(F): 98.6 (13 Nov 2024 12:13), Max: 98.6 (13 Nov 2024 08:18)  HR: 72 (13 Nov 2024 12:13) (60 - 77)  BP: 162/81 (13 Nov 2024 12:13) (122/60 - 162/81)  BP(mean): --  RR: 18 (13 Nov 2024 12:13) (18 - 18)  SpO2: 98% (13 Nov 2024 12:13) (96% - 98%)    Parameters below as of 13 Nov 2024 12:13  Patient On (Oxygen Delivery Method): room air    Appearance: NAD	  Cardiovascular: RRR, S1 and S2  Respiratory: CTA B/L  Gastrointestinal:  Soft, Non-tender, + BS	  Skin: No cyanosis	  Extremities: No LE edema, bilateral calves soft     LABS:	 	                  11.0   8.93  )-----------( 291      ( 12 Nov 2024 05:55 )             35.4     11-12    143  |  106  |  13  ----------------------------<  174[H]  4.0   |  26  |  0.90    Ca    9.2      12 Nov 2024 19:33  Phos  3.2     11-12  Mg     2.0     11-12    Urinalysis Basic - ( 12 Nov 2024 19:33 )    Color: x / Appearance: x / SG: x / pH: x  Gluc: 174 mg/dL / Ketone: x  / Bili: x / Urobili: x   Blood: x / Protein: x / Nitrite: x   Leuk Esterase: x / RBC: x / WBC x   Sq Epi: x / Non Sq Epi: x / Bacteria: x    Assessment:  1. R Soleal DVT  2. Seizures  3. Enlarged left adnexa  4. Cerebral lesion    Plan:  1. Currently on Heparin Subcutaneous TID.  2. Recommend repeat surveillance duplex on 11/15.  3. Pneumatic compression to L lower extremity.  4. TTE showed normal RV function.  5. Patient hemodynamically stable on RA. No cardiopulmonary symptoms.  6. CT chest, abdomen, pelvis reviewed.  7. Recommend dedicated CTA chest to rule out PE, since she may be pre-op for neurosurgery.  8. Will need outpatient GYN follow-up for asymmetrically enlarged left adnexa.  9. Appreciate excellent Neurosurgical care.     Thank you  IRWIN Saldaña, MS, [facility]  Vascular Cardiology Service    Please call with any questions:   DIRECT SERVICE NUMBER: [phone] / Available on TEAMS Vascular Cardiology Progress Note     DIRECT PROVIDER NUMBER: [phone]  / Available on TEAMS    CC: Seizures      Interval Events:  No C/P or SOB.  No LE pain or edema.     MEDICATIONS  (STANDING):  dextrose 5%. 1000 milliLiter(s) (100 mL/Hr) IV Continuous <Continuous>  dextrose 5%. 1000 milliLiter(s) (50 mL/Hr) IV Continuous <Continuous>  dextrose 50% Injectable 25 Gram(s) IV Push once  dextrose 50% Injectable 25 Gram(s) IV Push once  dextrose 50% Injectable 12.5 Gram(s) IV Push once  glucagon  Injectable 1 milliGRAM(s) IntraMuscular once  heparin   Injectable 5000 Unit(s) SubCutaneous every 8 hours  insulin lispro (ADMELOG) corrective regimen sliding scale   SubCutaneous at bedtime  insulin lispro (ADMELOG) corrective regimen sliding scale   SubCutaneous three times a day before meals  lacosamide 100 milliGRAM(s) Oral <User Schedule>  mupirocin 2% Nasal 1 Application(s) Both Nostrils every 12 hours  pantoprazole  Injectable 40 milliGRAM(s) IV Push daily  polyethylene glycol 3350 17 Gram(s) Oral two times a day  senna 2 Tablet(s) Oral at bedtime    PAST MEDICAL & SURGICAL HISTORY:  Diabetes mellitus  Glaucoma    FAMILY HISTORY: see HPI    SOCIAL HISTORY:  unchanged    REVIEW OF SYSTEMS:  CONSTITUTIONAL: No fever  EYES: No eye pain  ENT:  No throat pain  NECK: No pain   RESPIRATORY: No SOB   CARDIOVASCULAR:  No C/P  GASTROINTESTINAL: No abdominal pain  GENITOURINARY: No hematuria  SKIN: No LE wounds  LYMPH Nodes: No enlarged glands noted  ENDOCRINE: No heat or cold intolerance noted  MUSCULOSKELETAL: No LE edema  PSYCHIATRIC: No depression, anxiety  HEME/LYMPH: No bleeding gums  ALLERGY AND IMMUNOLOGIC: No hives    [ x] All others negative	    PHYSICAL EXAM:  Vital Signs Last 24 Hrs  T(C): 37 (13 Nov 2024 12:13), Max: 37 (13 Nov 2024 08:18)  T(F): 98.6 (13 Nov 2024 12:13), Max: 98.6 (13 Nov 2024 08:18)  HR: 72 (13 Nov 2024 12:13) (60 - 77)  BP: 162/81 (13 Nov 2024 12:13) (122/60 - 162/81)  BP(mean): --  RR: 18 (13 Nov 2024 12:13) (18 - 18)  SpO2: 98% (13 Nov 2024 12:13) (96% - 98%)    Parameters below as of 13 Nov 2024 12:13  Patient On (Oxygen Delivery Method): room air    Appearance: NAD	  Cardiovascular: RRR, S1 and S2  Respiratory: CTA B/L  Gastrointestinal:  Soft, Non-tender, + BS	  Skin: No cyanosis	  Extremities: No LE edema, bilateral calves soft     LABS:	 	                  11.0   8.93  )-----------( 291      ( 12 Nov 2024 05:55 )             35.4     11-12    143  |  106  |  13  ----------------------------<  174[H]  4.0   |  26  |  0.90    Ca    9.2      12 Nov 2024 19:33  Phos  3.2     11-12  Mg     2.0     11-12    Urinalysis Basic - ( 12 Nov 2024 19:33 )    Color: x / Appearance: x / SG: x / pH: x  Gluc: 174 mg/dL / Ketone: x  / Bili: x / Urobili: x   Blood: x / Protein: x / Nitrite: x   Leuk Esterase: x / RBC: x / WBC x   Sq Epi: x / Non Sq Epi: x / Bacteria: x    Assessment:  1. R Soleal DVT  2. Seizures  3. Enlarged left adnexa  4. Cerebral lesion    Plan:  1. Currently on Heparin Subcutaneous TID.  2. Recommend repeat surveillance duplex on 11/15.  3. Pneumatic compression to L lower extremity.  4. TTE showed normal RV function.  5. Patient hemodynamically stable on RA. No cardiopulmonary symptoms.  6. CT chest, abdomen, pelvis reviewed.  7. Recommend dedicated CTA chest to rule out PE, since she may be pre-op for neurosurgery.  8. Will need outpatient GYN follow-up for asymmetrically enlarged left adnexa.  9. Appreciate excellent Neurology & Neurosurgical care.     Thank you  IRWIN Saldaña, MS, [facility]  Vascular Cardiology Service    Please call with any questions:   DIRECT SERVICE NUMBER: [phone] / Available on TEAMS

## 2024-11-13 NOTE — PROGRESS NOTE ADULT - SUBJECTIVE AND OBJECTIVE BOX
Exam: alert, Ox2 (self, place), EOS, PERRL, FC, hypophonic, VARGHESE spontaneous AG, RUE drift    3.2cm T1/T2 hypointense, homogenously contrast enhancing planum mass, extending 2cm laterally from midline, abutting the left MIRIAM proximally.     Plan for possible rxn this admission, date/ plan pending

## 2024-11-13 NOTE — PROGRESS NOTE ADULT - SUBJECTIVE AND OBJECTIVE BOX
HPI:  69F with PMH diabetes and glaucoma. Presented to [facility] 11/10 after son found her on the ground this morning presumed seizure-like activity. Patient received a total of 15 mg of Versed during transport from first ED, Patient was intubated in the ED, started on propofol and fentanyl infusions.  Patient received a total of 2500 of IV Keppra at outside hospital.  Patient had a CT of the head that showed "1 cm partially calcified mass along the anterior skull base which may represent a meningioma.  There is mild mass effect on the inferior medial left frontal lobe.  No acute intracranial hemorrhage or midline shift."  Remainder of history is unobtainable as patient is intubated and sedated. Patient transferred to [facility] for further interventions.    SUBJECTIVE: No events overnight.  No new neurologic complaints. MRI completed    acetaminophen     Tablet .. 650 milliGRAM(s) Oral every 6 hours PRN  dextrose 5%. 1000 milliLiter(s) IV Continuous <Continuous>  dextrose 5%. 1000 milliLiter(s) IV Continuous <Continuous>  dextrose 50% Injectable 25 Gram(s) IV Push once  dextrose 50% Injectable 25 Gram(s) IV Push once  dextrose 50% Injectable 12.5 Gram(s) IV Push once  dextrose Oral Gel 15 Gram(s) Oral once PRN  glucagon  Injectable 1 milliGRAM(s) IntraMuscular once  heparin   Injectable 5000 Unit(s) SubCutaneous every 8 hours  insulin lispro (ADMELOG) corrective regimen sliding scale   SubCutaneous three times a day before meals  insulin lispro (ADMELOG) corrective regimen sliding scale   SubCutaneous at bedtime  lacosamide 100 milliGRAM(s) Oral <User Schedule>  mupirocin 2% Nasal 1 Application(s) Both Nostrils every 12 hours  ondansetron Injectable 4 milliGRAM(s) IV Push every 6 hours PRN  pantoprazole  Injectable 40 milliGRAM(s) IV Push daily  polyethylene glycol 3350 17 Gram(s) Oral two times a day  senna 2 Tablet(s) Oral at bedtime      Physical Exam: Neurological Exam:  	Mental Status: Orientated to self, date and place.  Attention intact.  No dysarthria, aphasia or neglect.  	Cranial Nerves: PERRL, EOMI, no nystagmus or diplopia. No facial asymmetry.  Hearing intact to finger rub bilaterally.  Tongue, uvula and palate midline.  Sternocleidomastoid and Trapezius intact bilaterally  	Motor: moving all 4 extremities equally w 5/5 strength  	Tone: normal.                  	Pronator drift: none                 	Dysmetria: None to finger-nose-finger  	No truncal ataxia.    	Tremor: No resting, postural or action tremor.  No myoclonus.  	Sensation: intact to light touch    LABS:                        11.0   8.93  )-----------( 291      ( 12 Nov 2024 05:55 )             35.4    11-12    143  |  106  |  13  ----------------------------<  174[H]  4.0   |  26  |  0.90    Ca    9.2      12 Nov 2024 19:33  Phos  3.2     11-12  Mg     2.0     11-12    TPro  4.3[L]  /  Alb  2.0[L]  /  TBili  0.5  /  DBili  0.2  /  AST  17  /  ALT  17  /  AlkPhos  43  11-11           IMAGING:     MRI  CTH  EEG     HPI:  69F with PMH diabetes and glaucoma. Presented to [facility] 11/10 after son found her on the ground this morning presumed seizure-like activity. Patient received a total of 15 mg of Versed during transport from first ED, Patient was intubated in the ED, started on propofol and fentanyl infusions.  Patient received a total of 2500 of IV Keppra at outside hospital.  Patient had a CT of the head that showed "1 cm partially calcified mass along the anterior skull base which may represent a meningioma.  There is mild mass effect on the inferior medial left frontal lobe.  No acute intracranial hemorrhage or midline shift."  Remainder of history is unobtainable as patient is intubated and sedated. Patient transferred to [facility] for further interventions. 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 Patient also had LED  positive for rt soleal DVT.    SUBJECTIVE: No events overnight.  No new neurologic complaints. MRI completed    acetaminophen     Tablet .. 650 milliGRAM(s) Oral every 6 hours PRN  dextrose 5%. 1000 milliLiter(s) IV Continuous <Continuous>  dextrose 5%. 1000 milliLiter(s) IV Continuous <Continuous>  dextrose 50% Injectable 25 Gram(s) IV Push once  dextrose 50% Injectable 25 Gram(s) IV Push once  dextrose 50% Injectable 12.5 Gram(s) IV Push once  dextrose Oral Gel 15 Gram(s) Oral once PRN  glucagon  Injectable 1 milliGRAM(s) IntraMuscular once  heparin   Injectable 5000 Unit(s) SubCutaneous every 8 hours  insulin lispro (ADMELOG) corrective regimen sliding scale   SubCutaneous three times a day before meals  insulin lispro (ADMELOG) corrective regimen sliding scale   SubCutaneous at bedtime  lacosamide 100 milliGRAM(s) Oral <User Schedule>  mupirocin 2% Nasal 1 Application(s) Both Nostrils every 12 hours  ondansetron Injectable 4 milliGRAM(s) IV Push every 6 hours PRN  pantoprazole  Injectable 40 milliGRAM(s) IV Push daily  polyethylene glycol 3350 17 Gram(s) Oral two times a day  senna 2 Tablet(s) Oral at bedtime      Physical Exam:  Constitutional: NAD, lying in bed  Neuro  * Mental Status:  Awake, alert, oriented to place, person and time. No difficulty speaking.   * Cranial Nerves: Cnii-Cnxii grossly intact. PERRL, EOMI, tongue midline, no gaze deviation  * Motor: RUE 4/5, LUE 4/5, LL promixmally and distally, no drift or dysmetria  * Sensory: Sensation intact to light touch  * Reflexes: not assessed   Cardiovascular: Regular rate and rhythm.  Eyes: See neurologic examination with detailed examination of eyes.  ENT: No JVD, Trachea Midline  Respiratory: non labored breathing     LABS:                        11.0   8.93  )-----------( 291      ( 12 Nov 2024 05:55 )             35.4    11-12    143  |  106  |  13  ----------------------------<  174[H]  4.0   |  26  |  0.90    Ca    9.2      12 Nov 2024 19:33  Phos  3.2     11-12  Mg     2.0     11-12    TPro  4.3[L]  /  Alb  2.0[L]  /  TBili  0.5  /  DBili  0.2  /  AST  17  /  ALT  17  /  AlkPhos  43  11-11           IMAGING:   MR Brain Stereotactic w/wo IV Cont (11.12.24 @ 11:46)    MR BRAIN WAW IC FOR SRS : -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 No parenchymal signal abnormality   of the adjacent frontal lobes.    -Mild ventriculomegaly secondary findings raising possibility of normal   pressure hydrocephalus.    < from: VA Duplex Lower Ext Vein Scan, Bilat (11.11.24 @ 17:51) >  FINDINGS:    RIGHT:  Normal compressibility of the RIGHT common femoral, femoral and popliteal   veins.  Doppler examination shows normal spontaneous and phasic flow.  RIGHT calf vein thrombosis is detected in the soleal vein.    LEFT:  Normal compressibility of the LEFT common femoral, femoral and popliteal   veins.  Doppler examination shows normal spontaneous and phasic flow.  No LEFT calf vein thrombosis is detected.    IMPRESSION:  No evidence of deep venous thrombosis in the left lower extremity.    RIGHT calf vein thrombosis is detected in the soleal vein.DUPLEX SCAN EXT VEINS LOWER BI   ORDERED BY: FABY BALDWIN     PROCEDURE DATE:  11/11/2024      < end of copied text >  CT Chest Abdomen and Pelvis w/ IV Cont (11.11.24 @ 14:04)   IMPRESSION:  Asymmetrically enlarged left adnexa. Recommend pelvic sonogram or MRI for   further evaluation.    No acute intrathoracic findings. CT ABDOMEN AND PELVIS IC   ORDERED BY:  SALVADOR PICKENS     ACC: [id number] EXAM:  CT CHEST IC   ORDERED BY:  SALVADOR PICKENS     PROCEDURE DATE:  11/11/2024      EEG:EEG Impression / Clinical Correlate:  Abnormal prolonged EEG study due to   1- risk of focal seizures arising from left frontal region  2- Focal cerebral dysfunction or structural abnormality in the left hemisphere region.  3- Moderate diffuse cerebral dysfunction that is not specific in etiology or at least partly due to medication    No seizures recorded.

## 2024-11-14 LAB
ALBUMIN SERPL ELPH-MCNC: 3.4 G/DL — SIGNIFICANT CHANGE UP (ref 3.3–5)
ALP SERPL-CCNC: 74 U/L — SIGNIFICANT CHANGE UP (ref 40–120)
ALT FLD-CCNC: 20 U/L — SIGNIFICANT CHANGE UP (ref 10–45)
ANION GAP SERPL CALC-SCNC: 14 MMOL/L — SIGNIFICANT CHANGE UP (ref 5–17)
AST SERPL-CCNC: 23 U/L — SIGNIFICANT CHANGE UP (ref 10–40)
BILIRUB SERPL-MCNC: 0.8 MG/DL — SIGNIFICANT CHANGE UP (ref 0.2–1.2)
BUN SERPL-MCNC: 10 MG/DL — SIGNIFICANT CHANGE UP (ref 7–23)
CALCIUM SERPL-MCNC: 9.4 MG/DL — SIGNIFICANT CHANGE UP (ref 8.4–10.5)
CHLORIDE SERPL-SCNC: 102 MMOL/L — SIGNIFICANT CHANGE UP (ref 96–108)
CO2 SERPL-SCNC: 25 MMOL/L — SIGNIFICANT CHANGE UP (ref 22–31)
CREAT SERPL-MCNC: 0.79 MG/DL — SIGNIFICANT CHANGE UP (ref 0.5–1.3)
EGFR: 80 ML/MIN/1.73M2 — SIGNIFICANT CHANGE UP
GLUCOSE BLDC GLUCOMTR-MCNC: 180 MG/DL — HIGH (ref 70–99)
GLUCOSE BLDC GLUCOMTR-MCNC: 187 MG/DL — HIGH (ref 70–99)
GLUCOSE BLDC GLUCOMTR-MCNC: 292 MG/DL — HIGH (ref 70–99)
GLUCOSE BLDC GLUCOMTR-MCNC: 300 MG/DL — HIGH (ref 70–99)
GLUCOSE SERPL-MCNC: 159 MG/DL — HIGH (ref 70–99)
HCT VFR BLD CALC: 36.8 % — SIGNIFICANT CHANGE UP (ref 34.5–45)
HGB BLD-MCNC: 11.4 G/DL — LOW (ref 11.5–15.5)
MCHC RBC-ENTMCNC: 23 PG — LOW (ref 27–34)
MCHC RBC-ENTMCNC: 31 G/DL — LOW (ref 32–36)
MCV RBC AUTO: 74.3 FL — LOW (ref 80–100)
NRBC # BLD: 0 /100 WBCS — SIGNIFICANT CHANGE UP (ref 0–0)
PLATELET # BLD AUTO: 294 K/UL — SIGNIFICANT CHANGE UP (ref 150–400)
POTASSIUM SERPL-MCNC: 3.9 MMOL/L — SIGNIFICANT CHANGE UP (ref 3.5–5.3)
POTASSIUM SERPL-SCNC: 3.9 MMOL/L — SIGNIFICANT CHANGE UP (ref 3.5–5.3)
PROT SERPL-MCNC: 7.1 G/DL — SIGNIFICANT CHANGE UP (ref 6–8.3)
RBC # BLD: 4.95 M/UL — SIGNIFICANT CHANGE UP (ref 3.8–5.2)
RBC # FLD: 13.4 % — SIGNIFICANT CHANGE UP (ref 10.3–14.5)
SODIUM SERPL-SCNC: 141 MMOL/L — SIGNIFICANT CHANGE UP (ref 135–145)
WBC # BLD: 6.04 K/UL — SIGNIFICANT CHANGE UP (ref 3.8–10.5)
WBC # FLD AUTO: 6.04 K/UL — SIGNIFICANT CHANGE UP (ref 3.8–10.5)

## 2024-11-14 PROCEDURE — 99232 SBSQ HOSP IP/OBS MODERATE 35: CPT

## 2024-11-14 RX ADMIN — Medication 3: at 17:23

## 2024-11-14 RX ADMIN — Medication 5000 UNIT(S): at 14:24

## 2024-11-14 RX ADMIN — Medication 1: at 07:49

## 2024-11-14 RX ADMIN — Medication 5000 UNIT(S): at 05:39

## 2024-11-14 RX ADMIN — LACOSAMIDE 100 MILLIGRAM(S): 10 INJECTION INTRAVENOUS at 17:24

## 2024-11-14 RX ADMIN — Medication 1 APPLICATION(S): at 05:40

## 2024-11-14 RX ADMIN — Medication 1: at 11:31

## 2024-11-14 RX ADMIN — LACOSAMIDE 100 MILLIGRAM(S): 10 INJECTION INTRAVENOUS at 05:39

## 2024-11-14 RX ADMIN — Medication 1: at 21:27

## 2024-11-14 RX ADMIN — PANTOPRAZOLE SODIUM 40 MILLIGRAM(S): 40 TABLET, DELAYED RELEASE ORAL at 11:31

## 2024-11-14 RX ADMIN — Medication 5000 UNIT(S): at 21:27

## 2024-11-14 RX ADMIN — Medication 1 APPLICATION(S): at 17:24

## 2024-11-14 NOTE — CHART NOTE - NSCHARTNOTEFT_GEN_A_CORE
Alert, Ox2 (self, place), EOS, PERRL, FC, hypophonic, VARGHESE spontaneous AG. EEG neg on Vimpat 100BID.     - Family and pt open to surgical options.   - Plan/ date pending  - Vasc cards saw for R soleal DVT, pt hemodynamically stable on RA, no cardiopulmonary symptoms, but rec'd CTA chest to rule out PE prior to surgery  - CTX for UTI dc'd yesterday.

## 2024-11-14 NOTE — EEG REPORT - NS EEG TEXT BOX
REPORT OF CONTINUOUS VIDEO EEG      [facility]: [address], [place] [address], Phone: [phone]  [facility]: [address], [place] [address], Phone: [phone]  [facility]: [address] [place] [address], Phone: [phone]    Patient Name: Cecy Frey   Age: 69 year, : 1955  [place] -6 [facility] [place]    Study Date: 2024	Start Time: 08:00      End Date:  2024	End Time: 18:17    Study Duration: 9 hr  51 min    Study Information:    EEG Recording Technique:  The patient underwent continuous Video-EEG monitoring, using Telemetry System hardware on the XLTek Digital System. EEG and video data were stored on a computer hard drive with important events saved in digital archive files. The material was reviewed by a physician (electroencephalographer / epileptologist) on a daily basis. Ross and seizure detection algorithms were utilized and reviewed. An EEG Technician attended to the patient, and was available throughout daytime work hours.  The epilepsy center neurologist was available in person or on call 24-hours per day.    EEG Placement and Labeling of Electrodes:  The EEG was performed utilizing 20 channel referential EEG connections (coronal over temporal over parasagittal montage) using all standard 10-20 electrode placements with EKG, with additional electrodes placed in the inferior temporal region using the modified 10-10 montage electrode placements for elective admissions, or if deemed necessary. Recording was at a sampling rate of 256 samples per second per channel. Time synchronized digital video recording was done simultaneously with EEG recording. A low light infrared camera was used for low light recording.     History: -  69 year old Female is here to rule out seizures    Medication  ZOFRAN    PROTONIX    VIMPAT      Interpretation:    [[[Abbreviation Key:  PDR=alpha rhythm/posterior dominant rhythm. A-P=anterior posterior.  Amplitude: ‘very low’:<20; ‘low’:20-49; ‘medium’:; ‘high’:>150uV.  Persistence for periodic/rhythmic patterns (% of epoch) ‘rare’:<1%; ‘occasional’:1-10%; ‘frequent’:10-50%; ‘abundant’:50-90%; ‘continuous’:>90%.  Persistence for sporadic discharges: ‘rare’:<1/hr; ‘occasional’:1/min-1/hr; ‘frequent’:>1/min; ‘abundant’:>1/10 sec.  RPP=rhythmic and periodic patterns; GRDA=generalized rhythmic delta activity; FIRDA=frontal intermittent GRDA; LRDA=lateralized rhythmic delta activity; TIRDA=temporal intermittent rhythmic delta activity;  LPD=PLED=lateralized periodic discharges; GPD=generalized periodic discharges; BIPDs =bilateral independent periodic discharges; Mf=multifocal; SIRPDs=stimulus induced rhythmic, periodic, or ictal appearing discharges; BIRDs=brief potentially ictal rhythmic discharges >4 Hz, lasting .5-10s; PFA (paroxysmal bursts >13 Hz or =8 Hz <10s).  Modifiers: +F=with fast component; +S=with spike component; +R=with rhythmic component.  S-B=burst suppression pattern.  Max=maximal. N1-drowsy; N2-stage II sleep; N3-slow wave sleep. SSS/BETS=small sharp spikes/benign epileptiform transients of sleep. HV=hyperventilation; PS=photic stimulation]]]    Daily EEG Visual Analysis    FINDINGS:      Background:  Symmetry: asymmetric  Continuous: continuous  PDR: unclear  Reactivity: present  Voltage: normal, [defined typically between 20-150uV]  Anterior Posterior Gradient: absent  Breach: absent    Background Slowing:  Generalized slowing: present. Background is diffusely slow consisting of polymorphic delta theta activity up to 5-6 Hz    Focal slowing: present. Continuous focal polymorphic delta theta slowing in left hemisphere region      State Changes:     Drowsiness and sleep did not occur    Sporadic Epileptiform Discharges:   Frequent sharp waves in left frontal region (FP1) at times periodic    Rhythmic and Periodic Patterns (RPPs):  LPDs at a rate of 0.5-1 Hz in left frontal region    Electrographic and Electroclinical seizures:  None    Other Clinical Events:  None    Activation Procedures:   -Hyperventilation was not performed.    -Photic stimulation was not performed.      Artifacts:  Intermittent myogenic and movement artifacts were noted.    ECG:  irregular HR  ________________________________________  EEG Classification:    Abnormal EEG in lethargic state  1.	Sharp waves and LPDs in left frontal region  2-         Continuous focal polymorphic delta theta slowing in left hemisphere region  3-         Moderate diffuse cerebral dysfunction that is not specific in etiology      ________________________________________  EEG Impression / Clinical Correlate:  Abnormal prolonged EEG study due to   1- risk of focal seizures arising from left frontal region  2- Focal cerebral dysfunction or structural abnormality in the left hemisphere region.  3- Moderate diffuse cerebral dysfunction that is not specific in etiology or at least partly due to medication    No seizures recorded.    ________________________________________    LOREE Rios  Attending Physician, [facility] [place]    ------------------------------------  EEG Reading Room: [phone]  On Call Service After Hours: [phone]              
   REPORT OF CONTINUOUS VIDEO EEG      [facility]: [address], [place] [address], Phone: [phone]  [facility]: [address], [place] [address], Phone: [phone]  [facility]: [address] [place] [address], Phone: [phone]    Patient Name: Cecy Frey   Age: 69 year, : 1955  [place]: -6 [facility] [place] [facility]    Study Date: 11/10/2024	Start Time: 15:34:35 PM      End Date:  2024	End Time: 08:00:03 AM     Study Duration: 15 hr  52 min    Study Information:    EEG Recording Technique:  The patient underwent continuous Video-EEG monitoring, using Telemetry System hardware on the XLTek Digital System. EEG and video data were stored on a computer hard drive with important events saved in digital archive files. The material was reviewed by a physician (electroencephalographer / epileptologist) on a daily basis. Ross and seizure detection algorithms were utilized and reviewed. An EEG Technician attended to the patient, and was available throughout daytime work hours.  The epilepsy center neurologist was available in person or on call 24-hours per day.    EEG Placement and Labeling of Electrodes:  The EEG was performed utilizing 20 channel referential EEG connections (coronal over temporal over parasagittal montage) using all standard 10-20 electrode placements with EKG, with additional electrodes placed in the inferior temporal region using the modified 10-10 montage electrode placements for elective admissions, or if deemed necessary. Recording was at a sampling rate of 256 samples per second per channel. Time synchronized digital video recording was done simultaneously with EEG recording. A low light infrared camera was used for low light recording.     History: -  69 year old Female is here to rule out seizures    Medication  ZOFRAN    PROTONIX    VIMPAT      Interpretation:    [[[Abbreviation Key:  PDR=alpha rhythm/posterior dominant rhythm. A-P=anterior posterior.  Amplitude: ‘very low’:<20; ‘low’:20-49; ‘medium’:; ‘high’:>150uV.  Persistence for periodic/rhythmic patterns (% of epoch) ‘rare’:<1%; ‘occasional’:1-10%; ‘frequent’:10-50%; ‘abundant’:50-90%; ‘continuous’:>90%.  Persistence for sporadic discharges: ‘rare’:<1/hr; ‘occasional’:1/min-1/hr; ‘frequent’:>1/min; ‘abundant’:>1/10 sec.  RPP=rhythmic and periodic patterns; GRDA=generalized rhythmic delta activity; FIRDA=frontal intermittent GRDA; LRDA=lateralized rhythmic delta activity; TIRDA=temporal intermittent rhythmic delta activity;  LPD=PLED=lateralized periodic discharges; GPD=generalized periodic discharges; BIPDs =bilateral independent periodic discharges; Mf=multifocal; SIRPDs=stimulus induced rhythmic, periodic, or ictal appearing discharges; BIRDs=brief potentially ictal rhythmic discharges >4 Hz, lasting .5-10s; PFA (paroxysmal bursts >13 Hz or =8 Hz <10s).  Modifiers: +F=with fast component; +S=with spike component; +R=with rhythmic component.  S-B=burst suppression pattern.  Max=maximal. N1-drowsy; N2-stage II sleep; N3-slow wave sleep. SSS/BETS=small sharp spikes/benign epileptiform transients of sleep. HV=hyperventilation; PS=photic stimulation]]]    Daily EEG Visual Analysis    FINDINGS:      Background:  Symmetry: asymmetric  Continuous: continuous  PDR: unclear  Reactivity: present  Voltage: normal, [defined typically between 20-150uV]  Anterior Posterior Gradient: absent  Breach: absent    Background Slowing:  Generalized slowing: present. Background is diffusely slow consisting of polymorphic delta theta activity up to 5-6 Hz    Focal slowing: present. Continuous focal polymorphic delta theta slowing in left hemisphere region      State Changes:     Drowsiness and sleep did not occur    Sporadic Epileptiform Discharges:   Frequent sharp waves in left frontal region (FP1) at times periodic    Rhythmic and Periodic Patterns (RPPs):  LPDs at a rate of 0.5-1 Hz in left frontal region    Electrographic and Electroclinical seizures:  None    Other Clinical Events:  None    Activation Procedures:   -Hyperventilation was not performed.    -Photic stimulation was not performed.      Artifacts:  Intermittent myogenic and movement artifacts were noted.    ECG:  irregular HR  ________________________________________  EEG Classification:    Abnormal EEG in lethargic state  1.	Sharp waves and LPDs in left frontal region  2-         Continuous focal polymorphic delta theta slowing in left hemisphere region  3-         Moderate diffuse cerebral dysfunction that is not specific in etiology      ________________________________________  EEG Impression / Clinical Correlate:  Abnormal prolonged EEG study due to   1- risk of focal seizures arising from left frontal region  2- Focal cerebral dysfunction or structural abnormality in the left hemisphere region.  3- Moderate diffuse cerebral dysfunction that is not specific in etiology or at least partly due to medication    No seizures recorded.    ________________________________________    LOREE Rios  Attending Physician, [facility]    ------------------------------------  EEG Reading Room: [phone]  On Call Service After Hours: [phone]        
------------------------------------------------------------------------------------------------------------------------------------     -- DAY 2 	     -- START: 11/12/2024  08:00 AM      -- END: 	11/13/2024  08:00 AM     -- DURATION (HRS): ?20 hr 59 min     ------------------------------------------------------------------------------------------------------------------------------------     DAILY EEG VISUAL ANALYSIS       The background was continuous, asymmetric, spontaneously variable and reactive. During wakefulness, there was no clear posterior dominant rhythm consisted of symmetric, well-modulated 6.5 - 7 Hz activity, poorly modulated with amplitude to 40 uV, that attenuated to eye opening. Low amplitude frontal beta was noted in wakefulness.      The anterior to posterior gradient was present.        BACKGROUND SLOWING:     Diffuse theta and delta frequencies were present.       FOCAL SLOWING:      Continuous left hemispheric focal polymorphic delta and theta activity was present.       SLEEP BACKGROUND:     Drowsiness was characterized by fragmentation, attenuation, and slowing of the background activity.       Sleep was characterized by the presence of vertex waves, symmetric sleep spindles and K-complexes.       OTHER NON-EPILEPTIFORM FINDINGS:     GPDs with triphasic morphology, at a frequency of 1 – 1.5 Hz were present.       ACTIVATION PROCEDURES:      Hyperventilation was not performed.       Photic stimulation was not performed.       INTERICTAL EPILEPTIFORM ACTIVITY:      None       EVENTS:     No events or seizures recorded.       ARTIFACTS:     Intermittent myogenic and movement artifacts were noted.       ECG:     The heart rate on single channel ECG was predominantly between 60 - 70 BPM.       ASMs:      Vimpat 100 mg BID       -------------------------------------------------------------------------------------------------------------------------------------------------------     EEG CLASSIFICATION:     Abnormal EEG in the awake, drowsy and asleep states.     - GPDs with triphasic morphology, 1 – 1.5 Hz     - Focal slowing, left hemisphere, continuous     - Moderate generalized background slowing          -------------------------------------------------------------------------------------------------------------------------------------------------------     IMPRESSION/CLINICAL CORRELATE:     This is an abnormal EEG record.      - Triphasic waves are typically seen in the setting of metabolic encephalopathy.      - Focal cerebral dysfunction in the left hemisphere, structural in etiology.     - Moderate diffuse / multi-focal cerebral dysfunction, not specific in etiology.        No seizures were recorded during this study.        -------------------------------------------------------------------------------------------------------------------------------------------------------       DO Noe Price, [facility] 
------------------------------------------------------------------------------------------------------------------------------------     -- DAY 3 	     -- START: 11/13/2024  08:00 AM      -- END: 	11/14/2024  10:32 AM     -- DURATION (HRS): ?24 hr 29 min (EEG disconnected 14:43 to 16:45)     ------------------------------------------------------------------------------------------------------------------------------------     DAILY EEG VISUAL ANALYSIS       The background was continuous, asymmetric, spontaneously variable and reactive. During wakefulness, there was no clear posterior dominant rhythm consisted of symmetric, well-modulated 7.5 - 8 Hz activity, poorly modulated with amplitude to 40 uV, that attenuated to eye opening. Low amplitude frontal beta was noted in wakefulness.      The anterior to posterior gradient was present.        BACKGROUND SLOWING:     Diffuse theta and delta frequencies were present.       FOCAL SLOWING:      Continuous left hemispheric focal polymorphic delta and theta activity was present.       SLEEP BACKGROUND:     Drowsiness was characterized by fragmentation, attenuation, and slowing of the background activity.       Sleep was characterized by the presence of vertex waves, symmetric sleep spindles and K-complexes.       OTHER NON-EPILEPTIFORM FINDINGS:     GPDs with triphasic morphology, at a frequency of 1 – 2 Hz were present.       ACTIVATION PROCEDURES:      Hyperventilation was not performed.       Photic stimulation was not performed.       INTERICTAL EPILEPTIFORM ACTIVITY:      None       EVENTS:     No events or seizures recorded.       ARTIFACTS:     Intermittent myogenic and movement artifacts were noted.       ECG:     The heart rate on single channel ECG was predominantly between 60 - 70 BPM.       ASMs:      Vimpat 100 mg BID     -------------------------------------------------------------------------------------------------------------------------------------------------------     EEG CLASSIFICATION:     Abnormal EEG in the awake, drowsy and asleep states.     - GPDs with triphasic morphology, 1 – 2 Hz     - Focal slowing, left hemisphere, continuous     - Mild to moderate generalized background slowing       -------------------------------------------------------------------------------------------------------------------------------------------------------     IMPRESSION/CLINICAL CORRELATE:     This is an abnormal EEG record.      - Triphasic waves are typically seen in the setting of metabolic encephalopathy.      - Focal cerebral dysfunction in the left hemisphere, structural in etiology.     - Mild to moderate diffuse / multi-focal cerebral dysfunction, not specific in etiology.       No seizures were recorded during this study.       -------------------------------------------------------------------------------------------------------------------------------------------------------     DO Noe Price, [facility] [place]

## 2024-11-14 NOTE — PROGRESS NOTE ADULT - SUBJECTIVE AND OBJECTIVE BOX
Vascular Cardiology Progress Note     DIRECT PROVIDER NUMBER: [phone]  / Available on TEAMS    CC: Seizures      Interval Events:  No C/P or SOB.  No LE pain or edema.    MEDICATIONS  (STANDING):  dextrose 5%. 1000 milliLiter(s) (100 mL/Hr) IV Continuous <Continuous>  dextrose 5%. 1000 milliLiter(s) (50 mL/Hr) IV Continuous <Continuous>  dextrose 50% Injectable 25 Gram(s) IV Push once  dextrose 50% Injectable 25 Gram(s) IV Push once  dextrose 50% Injectable 12.5 Gram(s) IV Push once  glucagon  Injectable 1 milliGRAM(s) IntraMuscular once  heparin   Injectable 5000 Unit(s) SubCutaneous every 8 hours  insulin lispro (ADMELOG) corrective regimen sliding scale   SubCutaneous three times a day before meals  insulin lispro (ADMELOG) corrective regimen sliding scale   SubCutaneous at bedtime  lacosamide 100 milliGRAM(s) Oral <User Schedule>  mupirocin 2% Nasal 1 Application(s) Both Nostrils every 12 hours  pantoprazole  Injectable 40 milliGRAM(s) IV Push daily  polyethylene glycol 3350 17 Gram(s) Oral two times a day  senna 2 Tablet(s) Oral at bedtime      PAST MEDICAL & SURGICAL HISTORY:  Diabetes mellitus  Glaucoma    FAMILY HISTORY: see HPI    SOCIAL HISTORY:  unchanged    REVIEW OF SYSTEMS:  CONSTITUTIONAL: No fever  EYES: No eye pain  ENT:  No throat pain  NECK: No pain   RESPIRATORY: No SOB   CARDIOVASCULAR:  No C/P  GASTROINTESTINAL: No abdominal pain  GENITOURINARY: No hematuria  SKIN: No LE wounds  LYMPH Nodes: No enlarged glands noted  ENDOCRINE: No heat or cold intolerance noted  MUSCULOSKELETAL: No LE edema  PSYCHIATRIC: No depression, anxiety  HEME/LYMPH: No bleeding gums  ALLERGY AND IMMUNOLOGIC: No hives    [ x] All others negative	   ICU Vital Signs Last 24 Hrs  T(C): 36.9 (14 Nov 2024 12:00), Max: 37.2 (14 Nov 2024 08:00)  T(F): 98.4 (14 Nov 2024 12:00), Max: 99 (14 Nov 2024 08:00)  HR: 56 (14 Nov 2024 12:00) (56 - 65)  BP: 148/77 (14 Nov 2024 12:00) (125/75 - 175/73)  BP(mean): --  ABP: --  ABP(mean): --  RR: 18 (14 Nov 2024 12:00) (18 - 18)  SpO2: 98% (14 Nov 2024 12:00) (98% - 100%)    O2 Parameters below as of 14 Nov 2024 12:00  Patient On (Oxygen Delivery Method): room air          Appearance: NAD	  Cardiovascular: RRR, S1 and S2  Respiratory: CTA B/L  Gastrointestinal:  Soft, Non-tender, + BS	  Skin: No cyanosis	  Extremities: No LE edema, bilateral calves soft                           11.4   6.04  )-----------( 294      ( 14 Nov 2024 06:36 )             36.8       Assessment:  1. R Soleal DVT  2. Seizures  3. Enlarged left adnexa  4. Cerebral lesion    Plan:  1. Continue Heparin Subcutaneous Prophlyaxis dose   2. Recommend repeat surveillance duplex on 11/15.  3. Pneumatic compression to L lower extremity.  4. TTE showed normal RV function.  5. Patient hemodynamically stable on RA. No cardiopulmonary symptoms.  6. CT chest, abdomen, pelvis reviewed.  7.  dedicated CTA chest to rule out PE was negative for PE 11/13  8. Will need  GYN follow-up for asymmetrically enlarged left adnexa.  9.  Appreciate NSX and neuro followup      Alena

## 2024-11-14 NOTE — PROGRESS NOTE ADULT - ASSESSMENT
Assessment and Plan:   · Assessment	  69F PMHx HTN, DM, glaucoma presents as transfer from [facility] due to seizures. Had 30 minutes of consecutive seizure activity, broke after Versed. Received Keppra 2500 mg total, intubated and sedated at time of arrival. Loaded with Vimpat 400 mg in NSCU. On exam, patient is intubated s/p propofol, currently on sedation with precedex. Not arousable, does not follow commands, left gaze preference. CT head remarkable for 1 cm lesion with mass effect. UA positive for UTI.    Impression: New onset seizures focal seizures with seondary generalisation s/p focal status epilepticus. Finding     Recommendations:  [] Reduced Vimpat to 100 mg BID due to moderate cerebral dysfunction on EEG  [] Rescue medications: 1st line- Lorazepam 1 mg IV push for seizures lasting >3 minutes with vital sign changes. 2nd line- Briviact 100 mg IV push for seizures lasting >3 minutes refractory to lorazepam.  [] Continue vEEG monitoring  [] Neurosurgery following given the finding of meningioma, plan for OR, timing to be decided  [] Pelvic USG 11/13: 4.6 cm indeterminate heterogeneous solid mass in the region of the left ovary/adnexa   [] rt soleal DVT, repeat US 11/15, will f/u with vascular  [] as per vascular CTA chest 11/13: No PE  [] IV ceftriaxone for E coli UTI, 3 day course completed on 11/13  [] Vital signs and neurological checks per unit protocol  [] Seizure, fall and aspiration precautions. Avoid sleep deprivation.  [] dvt PPX with SQH tid and  Pneumatic compression to L lower extremity only      seizure precaution:  [] Given concern for seizure, advised the patient with regards to risks and driving privileges associated with the [place] Guidelines. Advised patient regarding the risk of seizures and general seizure safety recommendations including no driving, not to be bathing alone, climbing to high places and operating heavy machinery, until cleared by follow-up outpatient Neurology. Reinforced the importance of compliance with medications. Discussed sleep hygiene and the risks of sleep disruption. Discussed the risk of death associated with seizures / SUDEP.      Patient seen and plans discussed with EMU attending, Dr. López.

## 2024-11-14 NOTE — PROGRESS NOTE ADULT - SUBJECTIVE AND OBJECTIVE BOX
HPI:  69F with PMH diabetes and glaucoma. Presented to [facility] 11/10 after son found her on the ground this morning presumed seizure-like activity. Patient received a total of 15 mg of Versed during transport from first ED, Patient was intubated in the ED, started on propofol and fentanyl infusions.  Patient received a total of 2500 of IV Keppra at outside hospital.  Patient had a CT of the head that showed "1 cm partially calcified mass along the anterior skull base which may represent a meningioma.  There is mild mass effect on the inferior medial left frontal lobe.  No acute intracranial hemorrhage or midline shift."  Remainder of history is unobtainable as patient is intubated and sedated. Patient transferred to [facility] for further interventions. 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 Patient also had LED  positive for rt soleal DVT.    SUBJECTIVE: No events overnight.  No new neurologic complaints.     MEDICATIONS  (STANDING):  dextrose 5%. 1000 milliLiter(s) (100 mL/Hr) IV Continuous <Continuous>  dextrose 5%. 1000 milliLiter(s) (50 mL/Hr) IV Continuous <Continuous>  dextrose 50% Injectable 25 Gram(s) IV Push once  dextrose 50% Injectable 25 Gram(s) IV Push once  dextrose 50% Injectable 12.5 Gram(s) IV Push once  glucagon  Injectable 1 milliGRAM(s) IntraMuscular once  heparin   Injectable 5000 Unit(s) SubCutaneous every 8 hours  insulin lispro (ADMELOG) corrective regimen sliding scale   SubCutaneous three times a day before meals  insulin lispro (ADMELOG) corrective regimen sliding scale   SubCutaneous at bedtime  lacosamide 100 milliGRAM(s) Oral <User Schedule>  mupirocin 2% Nasal 1 Application(s) Both Nostrils every 12 hours  pantoprazole  Injectable 40 milliGRAM(s) IV Push daily  polyethylene glycol 3350 17 Gram(s) Oral two times a day  senna 2 Tablet(s) Oral at bedtime    MEDICATIONS  (PRN):  acetaminophen     Tablet .. 650 milliGRAM(s) Oral every 6 hours PRN Temp greater or equal to 38.5C (101.3F), Mild Pain (1 - 3)  dextrose Oral Gel 15 Gram(s) Oral once PRN Blood Glucose LESS THAN 70 milliGRAM(s)/deciliter  ondansetron Injectable 4 milliGRAM(s) IV Push every 6 hours PRN Nausea and/or Vomiting      Vital Signs last 24 hours  T(C): 37.1 (11-14-24 @ 04:38), Max: 37.1 (11-14-24 @ 04:38)  T(F): 98.7 (11-14-24 @ 04:38), Max: 98.7 (11-14-24 @ 04:38)  HR: 65 (11-14-24 @ 04:38) (58 - 72)  BP: 126/74 (11-14-24 @ 04:38) (125/75 - 175/73)  RR: 18 (11-14-24 @ 04:38) (18 - 18)  SpO2: 100% (11-14-24 @ 04:38) (98% - 100%)  Wt(kg): --      Physical Exam:  Constitutional: NAD, lying in bed  Focused neurologic exam:  MS - AAO x3, speech fluent, rep/naming intact, follows commands, attn/conc/recent and remote memory/fund of knowledge WNL  CN - PERRLA, EOMI, VFF, face sens/str/hearing WNL b/l, tongue/palate midline, trap 5/5 b/l  Motor - Normal bulk/tone, generalized weakness 4/5 strength  Sens - LT/temp/vib intact all  DTR's - 2+ all and downgoing b/l plantar response  Coord - FtN intact b/l  Gait and station - not assessed due to fall risk    ENT: No JVD, Trachea Midline  Respiratory: non labored breathing       LABS:  cret                        11.4   6.04  )-----------( 294      ( 14 Nov 2024 06:36 )             36.8     11-14    141  |  102  |  10  ----------------------------<  159[H]  3.9   |  25  |  0.79    Ca    9.4      14 Nov 2024 06:36    TPro  7.1  /  Alb  3.4  /  TBili  0.8  /  DBili  x   /  AST  23  /  ALT  20  /  AlkPhos  74  11-14        IMAGING:   MR Brain Stereotactic w/wo IV Cont (11.12.24 @ 11:46)    MR BRAIN WAW IC FOR SRS : -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 No parenchymal signal abnormality   of the adjacent frontal lobes.    -Mild ventriculomegaly secondary findings raising possibility of normal   pressure hydrocephalus.    < from: [facility] Duplex Lower Ext Vein Scan, Bilat (11.11.24 @ 17:51) >  FINDINGS:    RIGHT:  Normal compressibility of the RIGHT common femoral, femoral and popliteal   veins.  Doppler examination shows normal spontaneous and phasic flow.  RIGHT calf vein thrombosis is detected in the soleal vein.    LEFT:  Normal compressibility of the LEFT common femoral, femoral and popliteal   veins.  Doppler examination shows normal spontaneous and phasic flow.  No LEFT calf vein thrombosis is detected.    IMPRESSION:  No evidence of deep venous thrombosis in the left lower extremity.    RIGHT calf vein thrombosis is detected in the soleal vein.DUPLEX SCAN EXT VEINS LOWER BI   ORDERED BY: FABY BALDWIN     PROCEDURE DATE:  11/11/2024      < end of copied text >  CT Chest Abdomen and Pelvis w/ IV Cont (11.11.24 @ 14:04)   IMPRESSION:  Asymmetrically enlarged left adnexa. Recommend pelvic sonogram or MRI for   further evaluation.    No acute intrathoracic findings. CT ABDOMEN AND PELVIS IC   ORDERED BY:  SALVADOR PICKENS     ACC: [id number] EXAM:  CT CHEST IC   ORDERED BY:  SALVADOR PICKENS     PROCEDURE DATE:  11/11/2024      EEG:EEG Impression / Clinical Correlate:  Abnormal prolonged EEG study due to   1- risk of focal seizures arising from left frontal region  2- Focal cerebral dysfunction or structural abnormality in the left hemisphere region.  3- Moderate diffuse cerebral dysfunction that is not specific in etiology or at least partly due to medication    No seizures recorded.    < from: CT Angio Chest PE Protocol w/ IV Cont (11.13.24 @ 16:38) >    IMPRESSION:  No pulmonary embolism.    No acute intrathoracic findings.    < end of copied text >    < from: US Doppler Pelvis (11.13.24 @ 15:59) >  IMPRESSION:    4.6 cm indeterminate heterogeneous solid mass in the region of the left   ovary/adnexa and adjacent to the uterus of uncertain origin. Differential   diagnosis includes solid ovarian neoplasm versus serosal fibroid.   RecommendMR pelvis with IV contrast for further evaluation.    < end of copied text >     HPI:  69F with PMH diabetes and glaucoma. Presented to [facility] 11/10 after son found her on the ground this morning presumed seizure-like activity. Patient received a total of 15 mg of Versed during transport from first ED, Patient was intubated in the ED, started on propofol and fentanyl infusions.  Patient received a total of 2500 of IV Keppra at outside hospital.  Patient had a CT of the head that showed "1 cm partially calcified mass along the anterior skull base which may represent a meningioma.  There is mild mass effect on the inferior medial left frontal lobe.  No acute intracranial hemorrhage or midline shift."  Remainder of history is unobtainable as patient is intubated and sedated. Patient transferred to [facility] for further interventions. 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 Patient also had LED  positive for rt soleal DVT.    SUBJECTIVE: No events overnight.  No new neurologic complaints.     MEDICATIONS  (STANDING):  dextrose 5%. 1000 milliLiter(s) (100 mL/Hr) IV Continuous <Continuous>  dextrose 5%. 1000 milliLiter(s) (50 mL/Hr) IV Continuous <Continuous>  dextrose 50% Injectable 25 Gram(s) IV Push once  dextrose 50% Injectable 25 Gram(s) IV Push once  dextrose 50% Injectable 12.5 Gram(s) IV Push once  glucagon  Injectable 1 milliGRAM(s) IntraMuscular once  heparin   Injectable 5000 Unit(s) SubCutaneous every 8 hours  insulin lispro (ADMELOG) corrective regimen sliding scale   SubCutaneous three times a day before meals  insulin lispro (ADMELOG) corrective regimen sliding scale   SubCutaneous at bedtime  lacosamide 100 milliGRAM(s) Oral <User Schedule>  mupirocin 2% Nasal 1 Application(s) Both Nostrils every 12 hours  pantoprazole  Injectable 40 milliGRAM(s) IV Push daily  polyethylene glycol 3350 17 Gram(s) Oral two times a day  senna 2 Tablet(s) Oral at bedtime    MEDICATIONS  (PRN):  acetaminophen     Tablet .. 650 milliGRAM(s) Oral every 6 hours PRN Temp greater or equal to 38.5C (101.3F), Mild Pain (1 - 3)  dextrose Oral Gel 15 Gram(s) Oral once PRN Blood Glucose LESS THAN 70 milliGRAM(s)/deciliter  ondansetron Injectable 4 milliGRAM(s) IV Push every 6 hours PRN Nausea and/or Vomiting      Vital Signs last 24 hours  T(C): 37.1 (11-14-24 @ 04:38), Max: 37.1 (11-14-24 @ 04:38)  T(F): 98.7 (11-14-24 @ 04:38), Max: 98.7 (11-14-24 @ 04:38)  HR: 65 (11-14-24 @ 04:38) (58 - 72)  BP: 126/74 (11-14-24 @ 04:38) (125/75 - 175/73)  RR: 18 (11-14-24 @ 04:38) (18 - 18)  SpO2: 100% (11-14-24 @ 04:38) (98% - 100%)  Wt(kg): --      Physical Exam:  Constitutional: NAD, lying in bed  Focused neurologic exam:  MS - AAO x3, speech fluent, rep/naming intact, follows commands, attn/conc/recent and remote memory/fund of knowledge WNL  CN - PERRLA, EOMI, VFF, face sens/str/hearing WNL b/l, tongue/palate midline, trap 5/5 b/l  Motor - Normal bulk/tone, improved strength throughout, 5/5 strength all extremities  Sens - LT/temp/vib intact all  DTR's - 2+ all and downgoing b/l plantar response  Coord - FtN intact b/l  Gait and station - not assessed due to fall risk    ENT: No JVD, Trachea Midline  Respiratory: non labored breathing       LABS:  cret                        11.4   6.04  )-----------( 294      ( 14 Nov 2024 06:36 )             36.8     11-14    141  |  102  |  10  ----------------------------<  159[H]  3.9   |  25  |  0.79    Ca    9.4      14 Nov 2024 06:36    TPro  7.1  /  Alb  3.4  /  TBili  0.8  /  DBili  x   /  AST  23  /  ALT  20  /  AlkPhos  74  11-14        IMAGING:   MR Brain Stereotactic w/wo IV Cont (11.12.24 @ 11:46)    MR BRAIN WAW IC FOR SRS : -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 No parenchymal signal abnormality   of the adjacent frontal lobes.    -Mild ventriculomegaly secondary findings raising possibility of normal   pressure hydrocephalus.    < from: VA Duplex Lower Ext Vein Scan, Bilat (11.11.24 @ 17:51) >  FINDINGS:    RIGHT:  Normal compressibility of the RIGHT common femoral, femoral and popliteal   veins.  Doppler examination shows normal spontaneous and phasic flow.  RIGHT calf vein thrombosis is detected in the soleal vein.    LEFT:  Normal compressibility of the LEFT common femoral, femoral and popliteal   veins.  Doppler examination shows normal spontaneous and phasic flow.  No LEFT calf vein thrombosis is detected.    IMPRESSION:  No evidence of deep venous thrombosis in the left lower extremity.    RIGHT calf vein thrombosis is detected in the soleal vein.DUPLEX SCAN EXT VEINS LOWER BI   ORDERED BY: FABY BALDWIN     PROCEDURE DATE:  11/11/2024      < end of copied text >  CT Chest Abdomen and Pelvis w/ IV Cont (11.11.24 @ 14:04)   IMPRESSION:  Asymmetrically enlarged left adnexa. Recommend pelvic sonogram or MRI for   further evaluation.    No acute intrathoracic findings. CT ABDOMEN AND PELVIS IC   ORDERED BY:  SALVADOR PICKENS     ACC: [id number] EXAM:  CT CHEST IC   ORDERED BY:  SALVADOR PICKENS     PROCEDURE DATE:  11/11/2024      EEG:EEG Impression / Clinical Correlate:  Abnormal prolonged EEG study due to   1- risk of focal seizures arising from left frontal region  2- Focal cerebral dysfunction or structural abnormality in the left hemisphere region.  3- Moderate diffuse cerebral dysfunction that is not specific in etiology or at least partly due to medication    No seizures recorded.    < from: CT Angio Chest PE Protocol w/ IV Cont (11.13.24 @ 16:38) >    IMPRESSION:  No pulmonary embolism.    No acute intrathoracic findings.    < end of copied text >    < from: US Doppler Pelvis (11.13.24 @ 15:59) >  IMPRESSION:    4.6 cm indeterminate heterogeneous solid mass in the region of the left   ovary/adnexa and adjacent to the uterus of uncertain origin. Differential   diagnosis includes solid ovarian neoplasm versus serosal fibroid.   RecommendMR pelvis with IV contrast for further evaluation.    < end of copied text >

## 2024-11-15 LAB
GLUCOSE BLDC GLUCOMTR-MCNC: 195 MG/DL — HIGH (ref 70–99)
GLUCOSE BLDC GLUCOMTR-MCNC: 277 MG/DL — HIGH (ref 70–99)
GLUCOSE BLDC GLUCOMTR-MCNC: 280 MG/DL — HIGH (ref 70–99)
GLUCOSE BLDC GLUCOMTR-MCNC: 306 MG/DL — HIGH (ref 70–99)

## 2024-11-15 PROCEDURE — 99232 SBSQ HOSP IP/OBS MODERATE 35: CPT

## 2024-11-15 PROCEDURE — 93970 EXTREMITY STUDY: CPT | Mod: 26

## 2024-11-15 RX ADMIN — Medication 2 TABLET(S): at 22:00

## 2024-11-15 RX ADMIN — ACETAMINOPHEN 500MG 650 MILLIGRAM(S): 500 TABLET, COATED ORAL at 23:04

## 2024-11-15 RX ADMIN — POLYETHYLENE GLYCOL 3350 17 GRAM(S): 17 POWDER, FOR SOLUTION ORAL at 17:22

## 2024-11-15 RX ADMIN — ACETAMINOPHEN 500MG 650 MILLIGRAM(S): 500 TABLET, COATED ORAL at 22:04

## 2024-11-15 RX ADMIN — Medication 5000 UNIT(S): at 14:58

## 2024-11-15 RX ADMIN — Medication 1: at 08:06

## 2024-11-15 RX ADMIN — Medication 1 APPLICATION(S): at 05:11

## 2024-11-15 RX ADMIN — Medication 2: at 22:01

## 2024-11-15 RX ADMIN — LACOSAMIDE 100 MILLIGRAM(S): 10 INJECTION INTRAVENOUS at 05:04

## 2024-11-15 RX ADMIN — LACOSAMIDE 100 MILLIGRAM(S): 10 INJECTION INTRAVENOUS at 17:22

## 2024-11-15 RX ADMIN — Medication 5000 UNIT(S): at 22:00

## 2024-11-15 RX ADMIN — Medication 3: at 11:57

## 2024-11-15 RX ADMIN — Medication 1 APPLICATION(S): at 17:22

## 2024-11-15 RX ADMIN — Medication 3: at 17:22

## 2024-11-15 RX ADMIN — Medication 5000 UNIT(S): at 05:04

## 2024-11-15 RX ADMIN — PANTOPRAZOLE SODIUM 40 MILLIGRAM(S): 40 TABLET, DELAYED RELEASE ORAL at 11:58

## 2024-11-15 NOTE — PROGRESS NOTE ADULT - SUBJECTIVE AND OBJECTIVE BOX
Vascular Cardiology Progress Note     DIRECT PROVIDER NUMBER: [phone]  / Available on TEAMS    CC: Seizures      Interval Events:  No C/P or SOB.  No LE pain or edema.      MEDICATIONS  (STANDING):  dextrose 5%. 1000 milliLiter(s) (100 mL/Hr) IV Continuous <Continuous>  dextrose 5%. 1000 milliLiter(s) (50 mL/Hr) IV Continuous <Continuous>  dextrose 50% Injectable 25 Gram(s) IV Push once  dextrose 50% Injectable 25 Gram(s) IV Push once  dextrose 50% Injectable 12.5 Gram(s) IV Push once  glucagon  Injectable 1 milliGRAM(s) IntraMuscular once  heparin   Injectable 5000 Unit(s) SubCutaneous every 8 hours  insulin lispro (ADMELOG) corrective regimen sliding scale   SubCutaneous three times a day before meals  insulin lispro (ADMELOG) corrective regimen sliding scale   SubCutaneous at bedtime  lacosamide 100 milliGRAM(s) Oral <User Schedule>  mupirocin 2% Nasal 1 Application(s) Both Nostrils every 12 hours  pantoprazole  Injectable 40 milliGRAM(s) IV Push daily  polyethylene glycol 3350 17 Gram(s) Oral two times a day  senna 2 Tablet(s) Oral at bedtime      PAST MEDICAL & SURGICAL HISTORY:  Diabetes mellitus  Glaucoma    FAMILY HISTORY: see HPI    SOCIAL HISTORY:  unchanged    REVIEW OF SYSTEMS:  CONSTITUTIONAL: No fever  EYES: No eye pain  ENT:  No throat pain  NECK: No pain   RESPIRATORY: No SOB   CARDIOVASCULAR:  No C/P  GASTROINTESTINAL: No abdominal pain  GENITOURINARY: No hematuria  SKIN: No LE wounds  LYMPH Nodes: No enlarged glands noted  ENDOCRINE: No heat or cold intolerance noted  MUSCULOSKELETAL: No LE edema  PSYCHIATRIC: No depression, anxiety  HEME/LYMPH: No bleeding gums  ALLERGY AND IMMUNOLOGIC: No hives    [ x] All others negative	   ICU Vital Signs Last 24 Hrs  T(C): 37.1 (15 Nov 2024 12:00), Max: 37.1 (15 Nov 2024 12:00)  T(F): 98.8 (15 Nov 2024 12:00), Max: 98.8 (15 Nov 2024 12:00)  HR: 69 (15 Nov 2024 12:00) (60 - 81)  BP: 130/73 (15 Nov 2024 12:00) (121/66 - 151/75)  BP(mean): --  ABP: --  ABP(mean): --  RR: 18 (15 Nov 2024 12:00) (18 - 18)  SpO2: 98% (15 Nov 2024 12:00) (98% - 99%)    O2 Parameters below as of 15 Nov 2024 12:00  Patient On (Oxygen Delivery Method): room air                Appearance: NAD	  Cardiovascular: RRR, S1 and S2  Respiratory: CTA B/L  Gastrointestinal:  Soft, Non-tender, + BS	  Skin: No cyanosis	  Extremities: No LE edema, bilateral calves soft                                       11.4   6.04  )-----------( 294      ( 14 Nov 2024 06:36 )             36.8       Assessment:  1. R Soleal DVT  2. Seizures  3. Enlarged left adnexa  4. Cerebral lesion    Plan:  1. She continues to have a below the knee DVT  2.  If no plans for surgery, then would give Apixaban 2.5mg BID for now  3.  Outpatient followup

## 2024-11-15 NOTE — DISCHARGE NOTE PROVIDER - CARE PROVIDER_API CALL
Yomaira López  Neurology  [address]  [place] [address]  Phone: [phone]  Fax: [phone]  Follow Up Time: 2 weeks    Neville Chaudhary  Neurosurgery  [address] [place] [id number]  Phone: [phone]  Fax: [phone]  Follow Up Time: 2 weeks    Hal Carvajal  Vascular Medicine  [address] [place] [id number]  Phone: [phone]  Fax: [phone]  Follow Up Time: 2 weeks

## 2024-11-15 NOTE — PROGRESS NOTE ADULT - SUBJECTIVE AND OBJECTIVE BOX
Patient seen for rehab follow up  CC: meningioma, seizure     patient complains legs feel weak  decreased sleep, legs "restless"     REVIEW OF SYSTEMS  Constitutional - No fever,  No fatigue  HEENT - No vertigo, No neck pain  Neurological - No headaches, No memory loss  Psychiatric - No depression, No anxiety    FUNCTIONAL PROGRESS  PT 11/14  bed mobility CG  transfers min assist   gait min assist x 10 feet  sitting rest     OT 11/14  cognitive eval  MoCA 19/30, cognitive impairment  follows all commands    OT 11/12  following 50-75% commands   bed mobility min assist   transfers min assist x 2  dressing min assist     VITALS  T(C): 36.8 (11-15-24 @ 04:39), Max: 36.9 (11-14-24 @ 12:00)  HR: 60 (11-15-24 @ 04:39) (56 - 81)  BP: 140/72 (11-15-24 @ 04:39) (121/66 - 148/77)  RR: 18 (11-15-24 @ 04:39) (18 - 18)  SpO2: 98% (11-15-24 @ 04:39) (98% - 99%)  Wt(kg): --    MEDICATIONS   acetaminophen     Tablet .. 650 milliGRAM(s) every 6 hours PRN  dextrose 5%. 1000 milliLiter(s) <Continuous>  dextrose 5%. 1000 milliLiter(s) <Continuous>  dextrose 50% Injectable 25 Gram(s) once  dextrose 50% Injectable 25 Gram(s) once  dextrose 50% Injectable 12.5 Gram(s) once  dextrose Oral Gel 15 Gram(s) once PRN  glucagon  Injectable 1 milliGRAM(s) once  heparin   Injectable 5000 Unit(s) every 8 hours  insulin lispro (ADMELOG) corrective regimen sliding scale   three times a day before meals  insulin lispro (ADMELOG) corrective regimen sliding scale   at bedtime  lacosamide 100 milliGRAM(s) <User Schedule>  mupirocin 2% Nasal 1 Application(s) every 12 hours  ondansetron Injectable 4 milliGRAM(s) every 6 hours PRN  pantoprazole  Injectable 40 milliGRAM(s) daily  polyethylene glycol 3350 17 Gram(s) two times a day  senna 2 Tablet(s) at bedtime      RECENT LABS - Reviewed                        11.4   6.04  )-----------( 294      ( 14 Nov 2024 06:36 )             36.8     11-14    141  |  102  |  10  ----------------------------<  159[H]  3.9   |  25  |  0.79    Ca    9.4      14 Nov 2024 06:36    TPro  7.1  /  Alb  3.4  /  TBili  0.8  /  DBili  x   /  AST  23  /  ALT  20  /  AlkPhos  74  11-14      Urinalysis Basic - ( 14 Nov 2024 06:36 )    Color: x / Appearance: x / SG: x / pH: x  Gluc: 159 mg/dL / Ketone: x  / Bili: x / Urobili: x   Blood: x / Protein: x / Nitrite: x   Leuk Esterase: x / RBC: x / WBC x   Sq Epi: x / Non Sq Epi: x / Bacteria: x      < from: CT Angio Chest PE Protocol w/ IV Cont (11.13.24 @ 16:38) >    IMPRESSION:  No pulmonary embolism.    No acute intrathoracic findings.    < end of copied text >      < from: MR Brain Stereotactic w/wo IV Cont (11.12.24 @ 11:46) >    IMPRESSION:  -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 No parenchymal signal abnormality   of the adjacent frontal lobes.    -Mild ventriculomegaly secondary findings raising possibility of normal   pressure hydrocephalus.    < end of copied text >      ----------------------------------------------------------------------------------------  PHYSICAL EXAM  Constitutional - NAD, Comfortable, in recliner   Chest - Breathing comfortably on room air   Cardiovascular - S1S2   Extremities - No C/C/E, No calf tenderness   Neurologic Exam -   follows commands                 Cognitive - Awake, Alert, AAO to self, place: hospital, month, year     Communication - Fluent, No dysarthria        Motor - moves all ext 5/5      Sensory - Intact to LT     Psychiatric - Mood stable, Affect WNL  ----------------------------------------------------------------------------------------  ASSESSMENT/PLAN  69yFemale h/o HTN, DM with functional deficits after seizure, found to have meningioma  MRI as above, possible resection this admission  on vimpat, EEG monitoring   Rt soleal DVT, on heparin TID, CTA negative for PE    Pain - Tylenol  Rehab -    patient requires min assist with mobility    continue bedside therapy while admitted to prevent secondary complications of immobility, bed mobility, transfer training, progressive ambulation, equipment evaluation, ADLs, bracing/splinting   OOB throughout the day with staff, OOB to chair with meals/3 hours daily        Recommend ACUTE inpatient rehabilitation for the functional deficits consisting of 3 hours of multidisciplinary intense therapy/day x 5 days/week x 2-3 weeks depending on progress at rehabilitation facility, 24 hour RN/daily PMR physician for comorbid medical management.      Patient will be able to participate in and benefit from intense rehabilitation therapies for 3 hours a day x 5 days/week to maximize independence.     Rehab recommendations are dependent on functional progress and participation with bedside therapy     Will continue to follow for ongoing rehab needs and recommendations.           35 minutes spent on total encounter  with chart review of PT/OT/SLP notes, exam, imaging, counseling and education on inpatient rehabilitation, coordination of care with rehab team and

## 2024-11-15 NOTE — PROGRESS NOTE ADULT - SUBJECTIVE AND OBJECTIVE BOX
HPI:  69F with PMH diabetes and glaucoma. Presented to [facility] 11/10 after son found her on the ground this morning presumed seizure-like activity. Patient received a total of 15 mg of Versed during transport from first ED, Patient was intubated in the ED, started on propofol and fentanyl infusions.  Patient received a total of 2500 of IV Keppra at outside hospital.  Patient had a CT of the head that showed "1 cm partially calcified mass along the anterior skull base which may represent a meningioma.  There is mild mass effect on the inferior medial left frontal lobe.  No acute intracranial hemorrhage or midline shift."  Remainder of history is unobtainable as patient is intubated and sedated. Patient transferred to [facility] for further interventions. 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 Patient also had LED  positive for rt soleal DVT.    SUBJECTIVE: No events overnight.  No new neurologic complaints.     MEDICATIONS  (STANDING):  dextrose 5%. 1000 milliLiter(s) (100 mL/Hr) IV Continuous <Continuous>  dextrose 5%. 1000 milliLiter(s) (50 mL/Hr) IV Continuous <Continuous>  dextrose 50% Injectable 25 Gram(s) IV Push once  dextrose 50% Injectable 25 Gram(s) IV Push once  dextrose 50% Injectable 12.5 Gram(s) IV Push once  glucagon  Injectable 1 milliGRAM(s) IntraMuscular once  heparin   Injectable 5000 Unit(s) SubCutaneous every 8 hours  insulin lispro (ADMELOG) corrective regimen sliding scale   SubCutaneous at bedtime  insulin lispro (ADMELOG) corrective regimen sliding scale   SubCutaneous three times a day before meals  lacosamide 100 milliGRAM(s) Oral <User Schedule>  mupirocin 2% Nasal 1 Application(s) Both Nostrils every 12 hours  pantoprazole  Injectable 40 milliGRAM(s) IV Push daily  polyethylene glycol 3350 17 Gram(s) Oral two times a day  senna 2 Tablet(s) Oral at bedtime    MEDICATIONS  (PRN):  acetaminophen     Tablet .. 650 milliGRAM(s) Oral every 6 hours PRN Temp greater or equal to 38.5C (101.3F), Mild Pain (1 - 3)  dextrose Oral Gel 15 Gram(s) Oral once PRN Blood Glucose LESS THAN 70 milliGRAM(s)/deciliter  ondansetron Injectable 4 milliGRAM(s) IV Push every 6 hours PRN Nausea and/or Vomiting      Vital Signs last 24 hours  T(C): 36.8 (11-15-24 @ 04:39), Max: 37.2 (11-14-24 @ 08:00)  T(F): 98.3 (11-15-24 @ 04:39), Max: 99 (11-14-24 @ 08:00)  HR: 60 (11-15-24 @ 04:39) (56 - 81)  BP: 140/72 (11-15-24 @ 04:39) (121/66 - 148/77)  RR: 18 (11-15-24 @ 04:39) (18 - 18)  SpO2: 98% (11-15-24 @ 04:39) (98% - 99%)  Wt(kg): --      Physical Exam:  Constitutional: NAD, lying in bed  Focused neurologic exam:  MS - AAO x3, speech fluent, rep/naming intact, follows commands, attn/conc/recent and remote memory/fund of knowledge WNL  CN - PERRLA, EOMI, VFF, face sens/str/hearing WNL b/l, tongue/palate midline, trap 5/5 b/l  Motor - Normal bulk/tone, improved strength throughout, 5/5 strength all extremities  Sens - LT/temp/vib intact all  DTR's - 2+ all and downgoing b/l plantar response  Coord - FtN intact b/l  Gait and station - not assessed due to fall risk    ENT: No JVD, Trachea Midline  Respiratory: non labored breathing       LABS:  cret                        11.4   6.04  )-----------( 294      ( 14 Nov 2024 06:36 )             36.8     11-14    141  |  102  |  10  ----------------------------<  159[H]  3.9   |  25  |  0.79    Ca    9.4      14 Nov 2024 06:36    TPro  7.1  /  Alb  3.4  /  TBili  0.8  /  DBili  x   /  AST  23  /  ALT  20  /  AlkPhos  74  11-14          IMAGING:   MR Brain Stereotactic w/wo IV Cont (11.12.24 @ 11:46)    MR BRAIN WAW IC FOR SRS : -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 No parenchymal signal abnormality   of the adjacent frontal lobes.    -Mild ventriculomegaly secondary findings raising possibility of normal   pressure hydrocephalus.    < from: [facility] Duplex Lower Ext Vein Scan, Bilat (11.11.24 @ 17:51) >  FINDINGS:    RIGHT:  Normal compressibility of the RIGHT common femoral, femoral and popliteal   veins.  Doppler examination shows normal spontaneous and phasic flow.  RIGHT calf vein thrombosis is detected in the soleal vein.    LEFT:  Normal compressibility of the LEFT common femoral, femoral and popliteal   veins.  Doppler examination shows normal spontaneous and phasic flow.  No LEFT calf vein thrombosis is detected.    IMPRESSION:  No evidence of deep venous thrombosis in the left lower extremity.    RIGHT calf vein thrombosis is detected in the soleal vein.DUPLEX SCAN EXT VEINS LOWER BI   ORDERED BY: FABY BALDWIN     PROCEDURE DATE:  11/11/2024      < end of copied text >  CT Chest Abdomen and Pelvis w/ IV Cont (11.11.24 @ 14:04)   IMPRESSION:  Asymmetrically enlarged left adnexa. Recommend pelvic sonogram or MRI for   further evaluation.    No acute intrathoracic findings. CT ABDOMEN AND PELVIS IC   ORDERED BY:  SALVADOR PICKENS     ACC: [id number] EXAM:  CT CHEST IC   ORDERED BY:  SALVADOR PICKENS     PROCEDURE DATE:  11/11/2024      EEG:EEG Impression / Clinical Correlate:  Abnormal prolonged EEG study due to   1- risk of focal seizures arising from left frontal region  2- Focal cerebral dysfunction or structural abnormality in the left hemisphere region.  3- Moderate diffuse cerebral dysfunction that is not specific in etiology or at least partly due to medication    No seizures recorded.    < from: CT Angio Chest PE Protocol w/ IV Cont (11.13.24 @ 16:38) >    IMPRESSION:  No pulmonary embolism.    No acute intrathoracic findings.    < end of copied text >    < from: US Doppler Pelvis (11.13.24 @ 15:59) >  IMPRESSION:    4.6 cm indeterminate heterogeneous solid mass in the region of the left   ovary/adnexa and adjacent to the uterus of uncertain origin. Differential   diagnosis includes solid ovarian neoplasm versus serosal fibroid.   RecommendMR pelvis with IV contrast for further evaluation.    < end of copied text >

## 2024-11-15 NOTE — DISCHARGE NOTE PROVIDER - NSDCCPCAREPLAN_GEN_ALL_CORE_FT
PRINCIPAL DISCHARGE DIAGNOSIS  Diagnosis: Seizures  Assessment and Plan of Treatment: Patient was admitted to NSICU s/p intubation for status seizure episode. CT head shows 1 cm partially calcified mass along the anterior skull base which may represent a meningioma with mild mass effect on left frontal lobe. Patient was extubated and transferred to EMU for seizure management. Patient was placed on vEEG, which shows ___________. Patient was treated with vimpat 100mg BID. Patient was evaluated by NSGY team for resection of meningioma.         SECONDARY DISCHARGE DIAGNOSES  Diagnosis: DVT, lower extremity  Assessment and Plan of Treatment: Patient was found to have DVT at right soleal vein. Vascular team was consulted and recommends_______. Repeat LE Duplex US shows________.    Diagnosis: Adnexal mass  Assessment and Plan of Treatment: CT CAP shows a left adnexal mass, which also showed in US pelvis. Patient can follow up with gynecologist outpatient for further evaluation.    Diagnosis: Meningioma  Assessment and Plan of Treatment: CT head shows 1 cm partially calcified mass along the anterior skull base which may represent a meningioma with mild mass effect on left frontal lobe. Patient was evaluated by NSGY team for resection of meningioma.     PRINCIPAL DISCHARGE DIAGNOSIS  Diagnosis: DM (diabetes mellitus)  Assessment and Plan of Treatment: Patient was evaluated by endocrinology team inpatient, recommended Insulin sliding scale with premeal lispro and night time lantus inpatient.    Patient can be transition to home med janumet 50/1000mg BID with Lantus 15 units bedtime daily, defer decision to rehab providers.  Please follow up with endocrinology outpatient.      SECONDARY DISCHARGE DIAGNOSES  Diagnosis: DVT, lower extremity  Assessment and Plan of Treatment: Patient was found to have DVT at right soleal vein. Vascular team was consulted and recommends 5mg BID. Repeat LE Duplex US shows new DVT. Vascular team recommends eliquis 5mg BID. Please follow up with vascular team outpatient    Diagnosis: Adnexal mass  Assessment and Plan of Treatment: CT CAP shows a left adnexal mass, which also showed in US pelvis. Patient can follow up with gynecologist outpatient for further evaluation.    Diagnosis: Meningioma  Assessment and Plan of Treatment: CT head shows 1 cm partially calcified mass along the anterior skull base which may represent a meningioma with mild mass effect on left frontal lobe. Patient was evaluated by NSGY team for resection of meningioma, patient refused surgery.   Nsgy team recommend dexamethasone for mass effect/edema, starting with Dex 4q6h 11/18 with taper as follows:  3q6h x 1 day 11/19  3q8h x 1 day 11/20  4 BID x 1 day 11/21  2 BID x 1 day 11/22  1 BID  x daily 11/23  -can stay on dexamethasone 1mg daily until seen in clinic  - Please follow up with vascular team outpatient     PRINCIPAL DISCHARGE DIAGNOSIS  Diagnosis: DM (diabetes mellitus)  Assessment and Plan of Treatment: Patient was evaluated by endocrinology team inpatient, recommended Insulin sliding scale with premeal lispro and night time lantus inpatient.   Patient can EITHER continue lispro + lantus 12 units bedtime  OR   Patient can be transition to home med janumet 50/1000mg BID PO with Lantus 12 units bedtime daily, defer decision to rehab providers.  Please follow up with endocrinology outpatient.      SECONDARY DISCHARGE DIAGNOSES  Diagnosis: DVT, lower extremity  Assessment and Plan of Treatment: Patient was found to have DVT at right soleal vein. Vascular team was consulted and recommends 5mg BID. Repeat LE Duplex US shows new DVT. Vascular team recommends eliquis 5mg BID. Please follow up with vascular team outpatient    Diagnosis: Adnexal mass  Assessment and Plan of Treatment: CT CAP shows a left adnexal mass, which also showed in US pelvis. Patient can follow up with gynecologist outpatient for further evaluation.    Diagnosis: Meningioma  Assessment and Plan of Treatment: CT head shows 1 cm partially calcified mass along the anterior skull base which may represent a meningioma with mild mass effect on left frontal lobe. Patient was evaluated by NSGY team for resection of meningioma, patient refused surgery.   Nsgy team recommend dexamethasone for mass effect/edema, starting with Dex 4q6h 11/18 with taper as follows:  3q6h x 1 day 11/19  3q8h x 1 day 11/20  4 BID x 1 day 11/21  2 BID x 1 day 11/22  1 BID  x daily 11/23  -can stay on dexamethasone 1mg daily until seen in clinic  - Please follow up with vascular team outpatient     PRINCIPAL DISCHARGE DIAGNOSIS  Diagnosis: DM (diabetes mellitus)  Assessment and Plan of Treatment: Patient was evaluated by endocrinology team inpatient, recommended Insulin sliding scale with premeal lispro and night time lantus inpatient.   Patient can EITHER continue lispro + lantus 12 units bedtime  OR   Patient can be transition to home med janumet 50/1000mg BID PO with Lantus 12 units bedtime daily, defer decision to rehab providers.  Please follow up with endocrinology outpatient.      SECONDARY DISCHARGE DIAGNOSES  Diagnosis: DVT, lower extremity  Assessment and Plan of Treatment: Patient was found to have DVT at right soleal vein. Vascular team was consulted and recommends 5mg BID. Repeat LE Duplex US shows new DVT. Vascular team recommends eliquis 5mg BID. Please follow up with vascular team outpatient    Diagnosis: Adnexal mass  Assessment and Plan of Treatment: CT CAP shows a left adnexal mass, which also showed in US pelvis. Patient can follow up with gynecologist outpatient for further evaluation.    Diagnosis: Meningioma  Assessment and Plan of Treatment: CT head shows 1 cm partially calcified mass along the anterior skull base which may represent a meningioma with mild mass effect on left frontal lobe. Patient was evaluated by NSGY team for resection of meningioma, patient refused surgery.   Nsgy team recommend dexamethasone for mass effect/edema, starting with Dex 4q6h 11/18 with taper as follows:  3q6h x 1 day 11/19  3q8h x 1 day 11/20  4 BID x 1 day 11/21  2 BID x 1 day 11/22  1 BID  x daily 11/23  -can stay on dexamethasone 1mg daily until seen in clinic  - Please follow up with vascular team outpatient    Diagnosis: Elevated LFTs  Assessment and Plan of Treatment: patient has mildly elevated LFT ( AST 57, ALT 71), pt is asymptomatic. please continue to monitor LFT and obtain abdominal US if LFT continues to elevate. please follow up with hepatology outpatient     PRINCIPAL DISCHARGE DIAGNOSIS  Diagnosis: DM (diabetes mellitus)  Assessment and Plan of Treatment: Patient was evaluated by endocrinology team inpatient, recommended Insulin sliding scale with premeal lispro and night time lantus inpatient.   Patient can EITHER continue lispro + lantus 15 units bedtime  OR   Patient can be transition to home med janumet 50/1000mg BID PO with Lantus 15 units bedtime daily, defer decision to rehab providers.  Please follow up with endocrinology outpatient.      SECONDARY DISCHARGE DIAGNOSES  Diagnosis: DVT, lower extremity  Assessment and Plan of Treatment: Patient was found to have DVT at right soleal vein. Vascular team was consulted and recommends 5mg BID. Repeat LE Duplex US shows new DVT. Vascular team recommends eliquis 5mg BID. Please follow up with vascular team outpatient    Diagnosis: Adnexal mass  Assessment and Plan of Treatment: CT CAP shows a left adnexal mass, which also showed in US pelvis. Patient can follow up with gynecologist outpatient for further evaluation.    Diagnosis: Meningioma  Assessment and Plan of Treatment: CT head shows 1 cm partially calcified mass along the anterior skull base which may represent a meningioma with mild mass effect on left frontal lobe. Patient was evaluated by NSGY team for resection of meningioma, patient refused surgery.   Nsgy team recommend dexamethasone for mass effect/edema, starting with Dex 4q6h 11/18 with taper as follows:  3q6h x 1 day 11/19  3q8h x 1 day 11/20  4 BID x 1 day 11/21  2 BID x 1 day 11/22  1 BID  x daily 11/23  -can stay on dexamethasone 1mg daily until seen in clinic  - Please follow up with vascular team outpatient    Diagnosis: Elevated LFTs  Assessment and Plan of Treatment: patient has mildly elevated LFT ( AST 57, ALT 71), pt is asymptomatic. please continue to monitor LFT and obtain abdominal US if LFT continues to elevate. please follow up with hepatology outpatient

## 2024-11-15 NOTE — DISCHARGE NOTE PROVIDER - NSDCFUADDINST_GEN_ALL_CORE_FT
seizure precaution:  [] Given concern for seizure, advised the patient with regards to risks and driving privileges associated with the [place] Guidelines. Advised patient regarding the risk of seizures and general seizure safety recommendations including no driving, not to be bathing alone, climbing to high places and operating heavy machinery, until cleared by follow-up outpatient Neurology. Reinforced the importance of compliance with medications. Discussed sleep hygiene and the risks of sleep disruption. Discussed the risk of death associated with seizures / SUDEP.

## 2024-11-15 NOTE — DISCHARGE NOTE PROVIDER - CARE PROVIDERS DIRECT ADDRESSES
,[email or web address]

## 2024-11-15 NOTE — PROGRESS NOTE ADULT - SUBJECTIVE AND OBJECTIVE BOX
Alert, Ox2 (self, place), EOS, PERRL, FC, hypophonic, VARGHESE spontaneous AG. EEG neg on Vimpat 100BID.     - Family and pt deciding surgery vs medical management of seizures  - EEG neg  - CT PE neg  - Rpt Va Duplex LE vein today

## 2024-11-15 NOTE — DISCHARGE NOTE PROVIDER - PROVIDER TOKENS
PROVIDER:[TOKEN:[[id number]],FOLLOWUP:[2 weeks]],PROVIDER:[TOKEN:[[id number]],FOLLOWUP:[2 weeks]],PROVIDER:[TOKEN:[[id number]],FOLLOWUP:[2 weeks]]

## 2024-11-15 NOTE — PROGRESS NOTE ADULT - ASSESSMENT
Assessment and Plan:   · Assessment	  69F PMHx HTN, DM, glaucoma presents as transfer from [facility] due to seizures. Had 30 minutes of consecutive seizure activity, broke after Versed. Received Keppra 2500 mg total, intubated and sedated at time of arrival. Loaded with Vimpat 400 mg in NSCU. On exam, patient is intubated s/p propofol, currently on sedation with precedex. Not arousable, does not follow commands, left gaze preference. CT head remarkable for 1 cm lesion with mass effect. UA positive for UTI.    Impression: New onset seizures focal seizures with seondary generalisation s/p focal status epilepticus. Finding     Recommendations:  [] Reduced Vimpat to 100 mg BID due to moderate cerebral dysfunction on EEG  [] Rescue medications: 1st line- Lorazepam 1 mg IV push for seizures lasting >3 minutes with vital sign changes. 2nd line- Briviact 100 mg IV push for seizures lasting >3 minutes refractory to lorazepam.  [] Continue vEEG monitoring  [] Neurosurgery following given the finding of meningioma, plan for OR, timing to be decided  [] Pelvic USG 11/13: 4.6 cm indeterminate heterogeneous solid mass in the region of the left ovary/adnexa   [] rt soleal DVT, repeat US 11/15, will f/u with vascular  [] as per vascular CTA chest 11/13: No PE  [] IV ceftriaxone for E coli UTI, 3 day course completed on 11/13  [] Vital signs and neurological checks per unit protocol  [] Seizure, fall and aspiration precautions. Avoid sleep deprivation.  [] dvt PPX with SQH tid and  Pneumatic compression to L lower extremity only      seizure precaution:  [] Given concern for seizure, advised the patient with regards to risks and driving privileges associated with the [place] Guidelines. Advised patient regarding the risk of seizures and general seizure safety recommendations including no driving, not to be bathing alone, climbing to high places and operating heavy machinery, until cleared by follow-up outpatient Neurology. Reinforced the importance of compliance with medications. Discussed sleep hygiene and the risks of sleep disruption. Discussed the risk of death associated with seizures / SUDEP.      Patient seen and plans discussed with EMU attending, Dr. López.    Assessment and Plan:   · Assessment	  69F PMHx HTN, DM, glaucoma presents as transfer from [facility] due to seizures. Had 30 minutes of consecutive seizure activity, broke after Versed. Received Keppra 2500 mg total, intubated and sedated at time of arrival. Loaded with Vimpat 400 mg in NSCU. On exam, patient is intubated s/p propofol, currently on sedation with precedex. Not arousable, does not follow commands, left gaze preference. CT head remarkable for 1 cm lesion with mass effect. UA positive for UTI.    Impression: New onset seizures focal seizures with seondary generalisation s/p focal status epilepticus. Finding     Recommendations:  [] continue Vimpat to 100 mg BID  [] Rescue medications: 1st line- Lorazepam 1 mg IV push for seizures lasting >3 minutes with vital sign changes. 2nd line- Briviact 100 mg IV push for seizures lasting >3 minutes refractory to lorazepam.  [] Continue vEEG monitoring  [] Neurosurgery following given the finding of meningioma, f/u with nsgy in regards to surgery  [] Pelvic USG 11/13: 4.6 cm indeterminate heterogeneous solid mass in the region of the left ovary/adnexa. Patient can follow up with GYN outpatient  [] rt soleal DVT, repeat US 11/15, repeat LE Duplex US completed today, pending report, will f/u with Vascular team  [] as per vascular CTA chest 11/13: No PE  [] IV ceftriaxone for E coli UTI, 3 day course completed on 11/13  [] Vital signs and neurological checks per unit protocol  [] Seizure, fall and aspiration precautions. Avoid sleep deprivation.  [] dvt PPX with SQH tid and  Pneumatic compression to L lower extremity only  [] PT/OT/PMR evaluated pt, recommended AR      seizure precaution:  [] Given concern for seizure, advised the patient with regards to risks and driving privileges associated with the [place] Guidelines. Advised patient regarding the risk of seizures and general seizure safety recommendations including no driving, not to be bathing alone, climbing to high places and operating heavy machinery, until cleared by follow-up outpatient Neurology. Reinforced the importance of compliance with medications. Discussed sleep hygiene and the risks of sleep disruption. Discussed the risk of death associated with seizures / SUDEP.      Patient seen and plans discussed with EMU attending, Dr. López.

## 2024-11-15 NOTE — DISCHARGE NOTE PROVIDER - NSDCMRMEDTOKEN_GEN_ALL_CORE_FT
insulin:   losartan:   NIFEdipine:    apixaban 5 mg oral tablet: 1 tab(s) orally every 12 hours  dexAMETHasone 4 mg oral tablet: 1 tab(s) orally every 6 hours Nsgy team recommend dexamethasone for mass effect/edema, starting with Dex 4q6h 11/18 with taper as follows:  3 mg q6h x 1 day on 11/19  3 mg q8h x 1 day on 11/20  4 mg BID x 1 day  on 11/21  2 mg BID x 1 day on 11/22  1mg BID  x 1 day on 11/23  1mg daily starting 11/24  -can stay on dexamethasone 1mg daily until seen in clinic  insulin lispro 100 units/mL injectable solution: 1 unit(s) subcutaneous 4 times a day (before meals and at bedtime) 0 Unit(s) if Glucose 61 - 250  2 Unit(s) if Glucose 251 - 300  4 Unit(s) if Glucose 301 - 350  6 Unit(s) if Glucose 351 - 400  8 Unit(s) if Glucose Greater Than 400    Give correctional scale insulin  REGARDLESS of PO status NOTIFY Provider for blood glucose LESS THAN 70 milliGRAM(s)/deciLiter or GREATER THAN 400 milliGRAM(s)/deciLiter      OR Patient can be transition to home med janumet 50/1000mg BID with Lantus 15 units bedtime daily, defer decision to rehab providers.  insulin lispro 100 units/mL injectable solution: 5 unit(s) subcutaneous 3 times a day (before meals) hold for NPO   hold  for blood glucose LESS THAN 70 milliGRAM(s)/deciLiter      OR Patient can be transition to home med janumet 50/1000mg BID with Lantus 15 units bedtime daily, defer decision to rehab providers.  Lantus 100 units/mL subcutaneous solution: 12 subcutaneous once a day (at bedtime)  losartan 25 mg oral tablet: 1 tab(s) orally once a day hold for SBP&lt;100  pantoprazole 40 mg oral delayed release tablet: 1 tab(s) orally once a day  polyethylene glycol 3350 oral powder for reconstitution: 17 gram(s) orally 2 times a day  senna leaf extract oral tablet: 2 tab(s) orally once a day (at bedtime)  Vimpat 100 mg oral tablet: 1 tab(s) orally 2 times a day   apixaban 5 mg oral tablet: 1 tab(s) orally every 12 hours  dexAMETHasone 4 mg oral tablet: 1 tab(s) orally every 6 hours Nsgy team recommend dexamethasone for mass effect/edema, starting with Dex 4q6h 11/18 with taper as follows:  3 mg q6h x 1 day on 11/19  3 mg q8h x 1 day on 11/20  4 mg BID x 1 day  on 11/21  2 mg BID x 1 day on 11/22  1mg BID  x 1 day on 11/23  1mg daily starting 11/24  -can stay on dexamethasone 1mg daily until seen in clinic  insulin lispro 100 units/mL injectable solution: 1 unit(s) subcutaneous 4 times a day (before meals and at bedtime) 0 Unit(s) if Glucose 61 - 250  2 Unit(s) if Glucose 251 - 300  4 Unit(s) if Glucose 301 - 350  6 Unit(s) if Glucose 351 - 400  8 Unit(s) if Glucose Greater Than 400  Give correctional scale insulin  REGARDLESS of PO status NOTIFY Provider for blood glucose LESS THAN 70 milliGRAM(s)/deciLiter or GREATER THAN 400 milliGRAM(s)/deciLiter  insulin lispro 100 units/mL injectable solution: 5 unit(s) subcutaneous 3 times a day (before meals) hold for NPO   hold  for blood glucose LESS THAN 70 milliGRAM(s)/deciLiter  Lantus 100 units/mL subcutaneous solution: 12 subcutaneous once a day (at bedtime)  losartan 25 mg oral tablet: 1 tab(s) orally once a day  pantoprazole 40 mg oral delayed release tablet: 1 tab(s) orally once a day  polyethylene glycol 3350 oral powder for reconstitution: 17 gram(s) orally 2 times a day  senna leaf extract oral tablet: 2 tab(s) orally once a day (at bedtime)  Vimpat 100 mg oral tablet: 1 tab(s) orally 2 times a day   apixaban 5 mg oral tablet: 1 tab(s) orally every 12 hours  dexAMETHasone 4 mg oral tablet: 1 tab(s) orally every 6 hours Nsgy team recommend dexamethasone for mass effect/edema, starting with Dex 4q6h 11/18 with taper as follows:  3 mg q6h x 1 day on 11/19  3 mg q8h x 1 day on 11/20  4 mg BID x 1 day  on 11/21  2 mg BID x 1 day on 11/22  1mg BID  x 1 day on 11/23  1mg daily starting 11/24  -can stay on dexamethasone 1mg daily until seen in clinic  insulin lispro 100 units/mL injectable solution: 1 unit(s) subcutaneous 4 times a day (before meals and at bedtime) 0 Unit(s) if Glucose 61 - 250  2 Unit(s) if Glucose 251 - 300  4 Unit(s) if Glucose 301 - 350  6 Unit(s) if Glucose 351 - 400  8 Unit(s) if Glucose Greater Than 400  Give correctional scale insulin  REGARDLESS of PO status NOTIFY Provider for blood glucose LESS THAN 70 milliGRAM(s)/deciLiter or GREATER THAN 400 milliGRAM(s)/deciLiter  insulin lispro 100 units/mL injectable solution: 7 unit(s) subcutaneous 3 times a day (before meals) hold for NPO   hold  for blood glucose LESS THAN 70 milliGRAM(s)/deciLiter  Lantus 100 units/mL subcutaneous solution: 15 unit(s) subcutaneous once a day (at bedtime) adjusted due to po steroid  losartan 25 mg oral tablet: 1 tab(s) orally once a day  pantoprazole 40 mg oral delayed release tablet: 1 tab(s) orally once a day  polyethylene glycol 3350 oral powder for reconstitution: 17 gram(s) orally 2 times a day  senna leaf extract oral tablet: 2 tab(s) orally once a day (at bedtime)  Vimpat 100 mg oral tablet: 1 tab(s) orally 2 times a day

## 2024-11-15 NOTE — DISCHARGE NOTE PROVIDER - HOSPITAL COURSE
HPI: 69F with PMH diabetes and glaucoma. Presented to [facility] 11/10 after son found her on the ground this morning presumed seizure-like activity. Patient received a total of 15 mg of Versed during transport from first ED, Patient was intubated in the ED, started on propofol and fentanyl infusions.  Patient received a total of 2500 of IV Keppra at outside hospital.  Patient had a CT of the head that showed "1 cm partially calcified mass along the anterior skull base which may represent a meningioma.  There is mild mass effect on the inferior medial left frontal lobe.  No acute intracranial hemorrhage or midline shift."  Remainder of history is unobtainable as patient is intubated and sedated. Patient transferred to [facility] for further interventions.      Hospital Course: Patient was admitted to NSICU s/p intubation for status seizure episode. CT head shows 1 cm partially calcified mass along the anterior skull base which may represent a meningioma with mild mass effect on left frontal lobe. Patient was extubated and transferred to EMU for seizure management. Patient was placed on vEEG, which shows ___________. Patient was treated with vimpat 100mg BID. Patient was evaluated by NSGY team for resection of meningioma. Patient was found to have DVT at right soleal vein. Vascular team was consulted and recommends_______. Repeat LE Duplex US shows________. CT CAP shows a left adnexal mass, which also showed in US pelvis. Patient can follow up with gynecologist outpatient for further evaluation.      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 No parenchymal signal abnormality of the adjacent frontal lobes.  -Mild ventriculomegaly secondary findings raising possibility of normal pressure hydrocephalus.     US Transvaginal (11.13.24 @ 15:59) : 4.6 cm indeterminate heterogeneous solid mass in the region of the left ovary/adnexa and adjacent to the uterus of uncertain origin.     CT Angio Chest PE Protocol w/ IV Cont (11.13.24 @ 16:38) :No pulmonary embolism. No acute intrathoracic findings.     VA Duplex Lower Ext Vein Scan, Bilat (11.11.24 @ 17:51) No evidence of deep venous thrombosis in the left lower extremity. RIGHT calf vein thrombosis is detected in the soleal vein.    Discharge planning:          HPI: 69F with PMH diabetes and glaucoma. Presented to [facility] 11/10 after son found her on the ground this morning presumed seizure-like activity. Patient received a total of 15 mg of Versed during transport from first ED, Patient was intubated in the ED, started on propofol and fentanyl infusions.  Patient received a total of 2500 of IV Keppra at outside hospital.  Patient had a CT of the head that showed "1 cm partially calcified mass along the anterior skull base which may represent a meningioma.  There is mild mass effect on the inferior medial left frontal lobe.  No acute intracranial hemorrhage or midline shift."  Remainder of history is unobtainable as patient is intubated and sedated. Patient transferred to [facility] for further interventions.      Hospital Course: Patient was admitted to NSICU s/p intubation for status seizure episode. CT head shows 1 cm partially calcified mass along the anterior skull base which may represent a meningioma with mild mass effect on left frontal lobe. Patient was extubated and transferred to EMU for seizure management. Patient was placed on vEEG 11/11-11/14:   Focal cerebral dysfunction in the left hemisphere, structural in etiology.  Mild to moderate diffuse / multi-focal cerebral dysfunction, not specific in etiology. No seizures were recorded during this study. Patient was started on AED vimpat 100mg BID. Patient was evaluated by NSGY team for resection of meningioma.  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  Nsgy recommends starting steroid for mass effect, recommended dexamethasone 4mg q6hr with daily taper dose.  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 No parenchymal signal abnormality of the adjacent frontal lobes.  -Mild ventriculomegaly secondary findings raising possibility of normal pressure hydrocephalus.     US Transvaginal (11.13.24 @ 15:59) : 4.6 cm indeterminate heterogeneous solid mass in the region of the left ovary/adnexa and adjacent to the uterus of uncertain origin.     CT Angio Chest PE Protocol w/ IV Cont (11.13.24 @ 16:38) :No pulmonary embolism. No acute intrathoracic findings.     VA Duplex Lower Ext Vein Scan, Bilat (11.11.24 @ 17:51) No evidence of deep venous thrombosis in the left lower extremity. RIGHT calf vein thrombosis is detected in the soleal vein.    EEG:  vEEG 11/11-11/14:   Focal cerebral dysfunction in the left hemisphere, structural in etiology.  Mild to moderate diffuse / multi-focal cerebral dysfunction, not specific in etiology. No seizures were recorded during this study.     Discharge planning:   Patient can continue vimpat 100mg BID, steroid taper, and eliquis 5mg BID.  Patient can f/u with nsgy, endocrinology, epilepsy team and vacular team outpatient.    seizure precaution:  [] Given concern for seizure, advised the patient with regards to risks and driving privileges associated with the [place] Guidelines. Advised patient regarding the risk of seizures and general seizure safety recommendations including no driving, not to be bathing alone, climbing to high places and operating heavy machinery, until cleared by follow-up outpatient Neurology. Reinforced the importance of compliance with medications. Discussed sleep hygiene and the risks of sleep disruption. Discussed the risk of death associated with seizures / SUDEP.       HPI: 69F with PMH diabetes and glaucoma. Presented to [facility] 11/10 after son found her on the ground this morning presumed seizure-like activity. Patient received a total of 15 mg of Versed during transport from first ED, Patient was intubated in the ED, started on propofol and fentanyl infusions.  Patient received a total of 2500 of IV Keppra at outside hospital.  Patient had a CT of the head that showed "1 cm partially calcified mass along the anterior skull base which may represent a meningioma.  There is mild mass effect on the inferior medial left frontal lobe.  No acute intracranial hemorrhage or midline shift."  Remainder of history is unobtainable as patient is intubated and sedated. Patient transferred to [facility] for further interventions.      Hospital Course: Patient was admitted to NSICU s/p intubation for status seizure episode. CT head shows 1 cm partially calcified mass along the anterior skull base which may represent a meningioma with mild mass effect on left frontal lobe. Patient was extubated and transferred to EMU for seizure management. Patient was placed on vEEG 11/11-11/14:   Focal cerebral dysfunction in the left hemisphere, structural in etiology.  Mild to moderate diffuse / multi-focal cerebral dysfunction, not specific in etiology. No seizures were recorded during this study. Patient was started on AED vimpat 100mg BID. Patient was evaluated by NSGY team for resection of meningioma.  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  Nsgy recommends starting steroid for mass effect, recommended dexamethasone 4mg q6hr with daily taper dose.  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 No parenchymal signal abnormality of the adjacent frontal lobes.  -Mild ventriculomegaly secondary findings raising possibility of normal pressure hydrocephalus.     US Transvaginal (11.13.24 @ 15:59) : 4.6 cm indeterminate heterogeneous solid mass in the region of the left ovary/adnexa and adjacent to the uterus of uncertain origin.     CT Angio Chest PE Protocol w/ IV Cont (11.13.24 @ 16:38) :No pulmonary embolism. No acute intrathoracic findings.     VA Duplex Lower Ext Vein Scan, Bilat (11.11.24 @ 17:51) No evidence of deep venous thrombosis in the left lower extremity. RIGHT calf vein thrombosis is detected in the soleal vein.    EEG:  vEEG 11/11-11/14:   Focal cerebral dysfunction in the left hemisphere, structural in etiology.  Mild to moderate diffuse / multi-focal cerebral dysfunction, not specific in etiology. No seizures were recorded during this study.     Discharge planning:   Patient can continue vimpat 100mg BID, steroid taper, and eliquis 5mg BID.  Patient can f/u with nsgy, endocrinology, epilepsy team and vacular team outpatient.    seizure precaution:  [] Given concern for seizure, advised the patient with regards to risks and driving privileges associated with the [place] Guidelines. Advised patient regarding the risk of seizures and general seizure safety recommendations including no driving, not to be bathing alone, climbing to high places and operating heavy machinery, until cleared by follow-up outpatient Neurology. Reinforced the importance of compliance with medications. Discussed sleep hygiene and the risks of sleep disruption. Discussed the risk of death associated with seizures / SUDEP.

## 2024-11-16 LAB
ALBUMIN SERPL ELPH-MCNC: 3.4 G/DL — SIGNIFICANT CHANGE UP (ref 3.3–5)
ALP SERPL-CCNC: 67 U/L — SIGNIFICANT CHANGE UP (ref 40–120)
ALT FLD-CCNC: 43 U/L — SIGNIFICANT CHANGE UP (ref 10–45)
ANION GAP SERPL CALC-SCNC: 10 MMOL/L — SIGNIFICANT CHANGE UP (ref 5–17)
AST SERPL-CCNC: 49 U/L — HIGH (ref 10–40)
BILIRUB SERPL-MCNC: 0.5 MG/DL — SIGNIFICANT CHANGE UP (ref 0.2–1.2)
BUN SERPL-MCNC: 14 MG/DL — SIGNIFICANT CHANGE UP (ref 7–23)
CALCIUM SERPL-MCNC: 9.4 MG/DL — SIGNIFICANT CHANGE UP (ref 8.4–10.5)
CHLORIDE SERPL-SCNC: 101 MMOL/L — SIGNIFICANT CHANGE UP (ref 96–108)
CO2 SERPL-SCNC: 27 MMOL/L — SIGNIFICANT CHANGE UP (ref 22–31)
CREAT SERPL-MCNC: 0.75 MG/DL — SIGNIFICANT CHANGE UP (ref 0.5–1.3)
EGFR: 86 ML/MIN/1.73M2 — SIGNIFICANT CHANGE UP
GLUCOSE BLDC GLUCOMTR-MCNC: 182 MG/DL — HIGH (ref 70–99)
GLUCOSE BLDC GLUCOMTR-MCNC: 226 MG/DL — HIGH (ref 70–99)
GLUCOSE BLDC GLUCOMTR-MCNC: 331 MG/DL — HIGH (ref 70–99)
GLUCOSE BLDC GLUCOMTR-MCNC: 451 MG/DL — CRITICAL HIGH (ref 70–99)
GLUCOSE BLDC GLUCOMTR-MCNC: 470 MG/DL — CRITICAL HIGH (ref 70–99)
GLUCOSE SERPL-MCNC: 230 MG/DL — HIGH (ref 70–99)
HCT VFR BLD CALC: 35 % — SIGNIFICANT CHANGE UP (ref 34.5–45)
HGB BLD-MCNC: 10.9 G/DL — LOW (ref 11.5–15.5)
MAGNESIUM SERPL-MCNC: 2 MG/DL — SIGNIFICANT CHANGE UP (ref 1.6–2.6)
MCHC RBC-ENTMCNC: 23 PG — LOW (ref 27–34)
MCHC RBC-ENTMCNC: 31.1 G/DL — LOW (ref 32–36)
MCV RBC AUTO: 74 FL — LOW (ref 80–100)
NRBC # BLD: 0 /100 WBCS — SIGNIFICANT CHANGE UP (ref 0–0)
PHOSPHATE SERPL-MCNC: 2.9 MG/DL — SIGNIFICANT CHANGE UP (ref 2.5–4.5)
PLATELET # BLD AUTO: 285 K/UL — SIGNIFICANT CHANGE UP (ref 150–400)
POTASSIUM SERPL-MCNC: 4 MMOL/L — SIGNIFICANT CHANGE UP (ref 3.5–5.3)
POTASSIUM SERPL-SCNC: 4 MMOL/L — SIGNIFICANT CHANGE UP (ref 3.5–5.3)
PROT SERPL-MCNC: 6.7 G/DL — SIGNIFICANT CHANGE UP (ref 6–8.3)
RBC # BLD: 4.73 M/UL — SIGNIFICANT CHANGE UP (ref 3.8–5.2)
RBC # FLD: 13.2 % — SIGNIFICANT CHANGE UP (ref 10.3–14.5)
SODIUM SERPL-SCNC: 138 MMOL/L — SIGNIFICANT CHANGE UP (ref 135–145)
WBC # BLD: 5.84 K/UL — SIGNIFICANT CHANGE UP (ref 3.8–10.5)
WBC # FLD AUTO: 5.84 K/UL — SIGNIFICANT CHANGE UP (ref 3.8–10.5)

## 2024-11-16 PROCEDURE — 99233 SBSQ HOSP IP/OBS HIGH 50: CPT

## 2024-11-16 PROCEDURE — 99232 SBSQ HOSP IP/OBS MODERATE 35: CPT | Mod: GC

## 2024-11-16 RX ORDER — ENOXAPARIN SODIUM 30 MG/.3ML
60 INJECTION SUBCUTANEOUS EVERY 12 HOURS
Refills: 0 | Status: DISCONTINUED | OUTPATIENT
Start: 2024-11-16 | End: 2024-11-16

## 2024-11-16 RX ORDER — INSULIN GLARGINE 100 [IU]/ML
15 INJECTION, SOLUTION SUBCUTANEOUS AT BEDTIME
Refills: 0 | Status: DISCONTINUED | OUTPATIENT
Start: 2024-11-16 | End: 2024-11-17

## 2024-11-16 RX ORDER — ENOXAPARIN SODIUM 30 MG/.3ML
70 INJECTION SUBCUTANEOUS EVERY 12 HOURS
Refills: 0 | Status: DISCONTINUED | OUTPATIENT
Start: 2024-11-16 | End: 2024-11-18

## 2024-11-16 RX ORDER — HEPARIN SODIUM,PORCINE 1000/ML
5000 VIAL (ML) INJECTION ONCE
Refills: 0 | Status: DISCONTINUED | OUTPATIENT
Start: 2024-11-16 | End: 2024-11-16

## 2024-11-16 RX ADMIN — ENOXAPARIN SODIUM 70 MILLIGRAM(S): 30 INJECTION SUBCUTANEOUS at 22:00

## 2024-11-16 RX ADMIN — Medication 5000 UNIT(S): at 12:03

## 2024-11-16 RX ADMIN — Medication 4: at 21:05

## 2024-11-16 RX ADMIN — Medication 2: at 08:23

## 2024-11-16 RX ADMIN — INSULIN GLARGINE 15 UNIT(S): 100 INJECTION, SOLUTION SUBCUTANEOUS at 22:35

## 2024-11-16 RX ADMIN — Medication 1: at 17:00

## 2024-11-16 RX ADMIN — LACOSAMIDE 100 MILLIGRAM(S): 10 INJECTION INTRAVENOUS at 05:06

## 2024-11-16 RX ADMIN — Medication 4 UNIT(S): at 21:59

## 2024-11-16 RX ADMIN — ACETAMINOPHEN 500MG 650 MILLIGRAM(S): 500 TABLET, COATED ORAL at 06:12

## 2024-11-16 RX ADMIN — Medication 5000 UNIT(S): at 05:06

## 2024-11-16 RX ADMIN — ACETAMINOPHEN 500MG 650 MILLIGRAM(S): 500 TABLET, COATED ORAL at 07:00

## 2024-11-16 RX ADMIN — Medication 4: at 12:03

## 2024-11-16 RX ADMIN — LACOSAMIDE 100 MILLIGRAM(S): 10 INJECTION INTRAVENOUS at 17:22

## 2024-11-16 RX ADMIN — PANTOPRAZOLE SODIUM 40 MILLIGRAM(S): 40 TABLET, DELAYED RELEASE ORAL at 12:04

## 2024-11-16 RX ADMIN — Medication 1 APPLICATION(S): at 05:06

## 2024-11-16 NOTE — CHART NOTE - NSCHARTNOTEFT_GEN_A_CORE
HIEU HERRMANN  [id number]  [facility] [address] D1    The patient is a 69yFemale with PMH of HTN, DM2, glaucoma presents as transfer from [facility] due to seizures. Endocrinology consulted for T2DM.    #Uncontrolled Type 2 Diabetes Mellitus  - A1C with Estimated Average Glucose Result: 7.1 % (11-10-24 @ 15:29)  - home regimen: lantus 15 units qhs and metformin per primary team  - eGFR: 86 mL/min/1.73m2 (11-16-24)  - Weight (kg): 70 (11-10-24)  - glucose 400s tonight, no evidence of DKA on labs, only on correction scales    PLAN:  - Recommend Lantus 15 units QHS   - Recommend Admelog 5 units TID before meals (HOLD if NPO or not eating)  - Recommend low dose Admelog correction scale TID before meals and separate low dose scale QHS  - Please check FSG before meals and QHS, or q6h while NPO  - Inpatient glucose goals: 100-180  - consistent carb diet when eating    Discussed with primary team. Full consult to follow in the AM.    Thien Mcintosh MD  Endocrine Fellow  For nonurgent matters, please email [email or web address] or [email or web address]. For urgent matters only, please call answering service at [phone] (weekdays), [phone] (nights/weekends). HIEU HERRMANN  [id number]  [facility] [address] [address] D1    The patient is a 69yFemale with PMH of HTN, DM2, glaucoma presents as transfer from [facility] due to seizures. Endocrinology consulted for T2DM.    #Uncontrolled Type 2 Diabetes Mellitus  - A1C with Estimated Average Glucose Result: 7.1 % (11-10-24 @ 15:29)  - home regimen: lantus 15 units qhs and metformin per primary team  - eGFR: 86 mL/min/1.73m2 (11-16-24)  - Weight (kg): 70 (11-10-24)  - glucose 400s tonight, no evidence of DKA on labs, only on correction scales    PLAN:  - Recommend Lantus 15 units QHS   - Recommend change to moderate dose Admelog correction scale TID before meals and separate moderate dose scale QHS  - Please check FSG before meals and QHS, or q6h while NPO  - Inpatient glucose goals: 100-180  - consistent carb diet when eating    Discussed with primary team. Full consult to follow in the AM.    Thien Mcintosh MD  Endocrine Fellow  For nonurgent matters, please email [email or web address] or [email or web address]. For urgent matters only, please call answering service at [phone] (weekdays), [phone] (nights/weekends).

## 2024-11-16 NOTE — PROVIDER CONTACT NOTE (OTHER) - BACKGROUND
Pt admitted under EMU after being found down on floor with seizure like activity. CTH revealed brain mass.

## 2024-11-16 NOTE — PROGRESS NOTE ADULT - SUBJECTIVE AND OBJECTIVE BOX
Neurology Progress Note    Interval History:    Patient seen and examined with epilepsy team. Since yesterday, patient was found to have a new R soleal DVT. She was evaluated by vascular surgery and is pending clarification of potential surgical planning with neurosurgery prior to decision on initiating anti-coagulation. Patient is hesitant about both surgery and radiosurgery though is open to discussion. No new acute complaints.      PAST MEDICAL & SURGICAL HISTORY:  Diabetes mellitus    Glaucoma    No significant past surgical history            Medications:  acetaminophen     Tablet .. 650 milliGRAM(s) Oral every 6 hours PRN  dextrose 5%. 1000 milliLiter(s) IV Continuous <Continuous>  dextrose 5%. 1000 milliLiter(s) IV Continuous <Continuous>  dextrose 50% Injectable 25 Gram(s) IV Push once  dextrose 50% Injectable 12.5 Gram(s) IV Push once  dextrose 50% Injectable 25 Gram(s) IV Push once  dextrose Oral Gel 15 Gram(s) Oral once PRN  glucagon  Injectable 1 milliGRAM(s) IntraMuscular once  heparin   Injectable 5000 Unit(s) SubCutaneous every 8 hours  insulin lispro (ADMELOG) corrective regimen sliding scale   SubCutaneous three times a day before meals  insulin lispro (ADMELOG) corrective regimen sliding scale   SubCutaneous at bedtime  lacosamide 100 milliGRAM(s) Oral <User Schedule>  ondansetron Injectable 4 milliGRAM(s) IV Push every 6 hours PRN  pantoprazole  Injectable 40 milliGRAM(s) IV Push daily  polyethylene glycol 3350 17 Gram(s) Oral two times a day  senna 2 Tablet(s) Oral at bedtime      Vital Signs Last 24 Hrs  T(C): 36.6 (16 Nov 2024 11:30), Max: 37.1 (15 Nov 2024 12:00)  T(F): 97.9 (16 Nov 2024 11:30), Max: 98.8 (15 Nov 2024 12:00)  HR: 60 (16 Nov 2024 11:30) (60 - 70)  BP: 156/72 (16 Nov 2024 11:30) (119/58 - 156/72)  BP(mean): --  RR: 18 (16 Nov 2024 11:30) (18 - 18)  SpO2: 99% (16 Nov 2024 11:30) (95% - 99%)    Parameters below as of 16 Nov 2024 11:30  Patient On (Oxygen Delivery Method): room air        General:  Constitutional: Female, appears stated age, nontoxic, not in distress,    Head: Normocephalic;   Eyes: clear sclera;   Extremities: No cyanosis;   Resp: breathing comfortably     Neurological (>12):  MS: Awake, alert.  Oriented person place situation. Follows commands. Attends to examiner  Language: Speech is hypophonic, clear, fluent, good repetition,  comprehension, registration of words.  CNs: PERRL (R 3mm, L 3mm). VFF. EOMI. No disconjugate gaze, skew. V1-3 intact LT, No facial asymmetry b/l. Hearing grossly normal b/l. Tongue midline.     Motor - Normal bulk and tone throughout. No pronator drift.    L/R (out of 5)       Deltoid  5/5    Biceps   5/5      Triceps  5/5       5/5   L/R (out of 5)       Hip Flexion  5/5    Hip Extension  5/5  Knee Extension  5/5  Dorsiflexion  5/5      Plantar Flexion 5/5     Sensation: Intact to LT b/l. Cortical: Extinction on DSS (neglect): none  Reflexes L/R:  Biceps(C5) 2/2  BR(C6) 2/2   Triceps(C7)  2/2 Patellar(L4)   2/2   Coordination: No dysmetria to FTN b/l UE  Gait: Patient able to walk from bed to bedside chair without issue    Labs:  CBC Full  -  ( 16 Nov 2024 05:41 )  WBC Count : 5.84 K/uL  RBC Count : 4.73 M/uL  Hemoglobin : 10.9 g/dL  Hematocrit : 35.0 %  Platelet Count - Automated : 285 K/uL  Mean Cell Volume : 74.0 fl  Mean Cell Hemoglobin : 23.0 pg  Mean Cell Hemoglobin Concentration : 31.1 g/dL  Auto Neutrophil # : x  Auto Lymphocyte # : x  Auto Monocyte # : x  Auto Eosinophil # : x  Auto Basophil # : x  Auto Neutrophil % : x  Auto Lymphocyte % : x  Auto Monocyte % : x  Auto Eosinophil % : x  Auto Basophil % : x    11-16    138  |  101  |  14  ----------------------------<  230[H]  4.0   |  27  |  0.75    Ca    9.4      16 Nov 2024 05:41  Phos  2.9     11-16  Mg     2.0     11-16    TPro  6.7  /  Alb  3.4  /  TBili  0.5  /  DBili  x   /  AST  49[H]  /  ALT  43  /  AlkPhos  67  11-16    LIVER FUNCTIONS - ( 16 Nov 2024 05:41 )  Alb: 3.4 g/dL / Pro: 6.7 g/dL / ALK PHOS: 67 U/L / ALT: 43 U/L / AST: 49 U/L / GGT: x             Urinalysis Basic - ( 16 Nov 2024 05:41 )    Color: x / Appearance: x / SG: x / pH: x  Gluc: 230 mg/dL / Ketone: x  / Bili: x / Urobili: x   Blood: x / Protein: x / Nitrite: x   Leuk Esterase: x / RBC: x / WBC x   Sq Epi: x / Non Sq Epi: x / Bacteria: x       Neurology Progress Note    Interval History:    Patient seen and examined with epilepsy team. Since yesterday, patient was found to have a new R soleal DVT. She was evaluated by vascular surgery and is pending clarification of potential surgical planning with neurosurgery prior to decision on initiating anti-coagulation. Patient is hesitant about both surgery and radiosurgery though is open to discussion. No new acute complaints.      PAST MEDICAL & SURGICAL HISTORY:  Diabetes mellitus    Glaucoma    No significant past surgical history    Medications:  acetaminophen     Tablet .. 650 milliGRAM(s) Oral every 6 hours PRN  dextrose 5%. 1000 milliLiter(s) IV Continuous <Continuous>  dextrose 5%. 1000 milliLiter(s) IV Continuous <Continuous>  dextrose 50% Injectable 25 Gram(s) IV Push once  dextrose 50% Injectable 12.5 Gram(s) IV Push once  dextrose 50% Injectable 25 Gram(s) IV Push once  dextrose Oral Gel 15 Gram(s) Oral once PRN  glucagon  Injectable 1 milliGRAM(s) IntraMuscular once  heparin   Injectable 5000 Unit(s) SubCutaneous every 8 hours  insulin lispro (ADMELOG) corrective regimen sliding scale   SubCutaneous three times a day before meals  insulin lispro (ADMELOG) corrective regimen sliding scale   SubCutaneous at bedtime  lacosamide 100 milliGRAM(s) Oral <User Schedule>  ondansetron Injectable 4 milliGRAM(s) IV Push every 6 hours PRN  pantoprazole  Injectable 40 milliGRAM(s) IV Push daily  polyethylene glycol 3350 17 Gram(s) Oral two times a day  senna 2 Tablet(s) Oral at bedtime      Vital Signs Last 24 Hrs  T(C): 36.6 (16 Nov 2024 11:30), Max: 37.1 (15 Nov 2024 12:00)  T(F): 97.9 (16 Nov 2024 11:30), Max: 98.8 (15 Nov 2024 12:00)  HR: 60 (16 Nov 2024 11:30) (60 - 70)  BP: 156/72 (16 Nov 2024 11:30) (119/58 - 156/72)  BP(mean): --  RR: 18 (16 Nov 2024 11:30) (18 - 18)  SpO2: 99% (16 Nov 2024 11:30) (95% - 99%)    Parameters below as of 16 Nov 2024 11:30  Patient On (Oxygen Delivery Method): room air        General:  Constitutional: Female, appears stated age, nontoxic, not in distress,    Head: Normocephalic;   Eyes: clear sclera;   Extremities: No cyanosis;   Resp: breathing comfortably     Neurological (>12):  MS: Awake, alert.  Oriented person place situation. Follows commands. Attends to examiner  Language: Speech is hypophonic, clear, fluent, good repetition,  comprehension, registration of words.  CNs: PERRL (R 3mm, L 3mm). VFF. EOMI. No disconjugate gaze, skew. V1-3 intact LT, No facial asymmetry b/l. Hearing grossly normal b/l. Tongue midline.     Motor - Normal bulk and tone throughout. No pronator drift.    L/R (out of 5)       Deltoid  5/5    Biceps   5/5      Triceps  5/5       5/5   L/R (out of 5)       Hip Flexion  5/5    Hip Extension  5/5  Knee Extension  5/5  Dorsiflexion  5/5      Plantar Flexion 5/5     Sensation: Intact to LT b/l. Cortical: Extinction on DSS (neglect): none  Reflexes L/R:  Biceps(C5) 2/2  BR(C6) 2/2   Triceps(C7)  2/2 Patellar(L4)   2/2   Coordination: No dysmetria to FTN b/l UE  Gait: Patient able to walk from bed to bedside chair without issue    Labs:  CBC Full  -  ( 16 Nov 2024 05:41 )  WBC Count : 5.84 K/uL  RBC Count : 4.73 M/uL  Hemoglobin : 10.9 g/dL  Hematocrit : 35.0 %  Platelet Count - Automated : 285 K/uL  Mean Cell Volume : 74.0 fl  Mean Cell Hemoglobin : 23.0 pg  Mean Cell Hemoglobin Concentration : 31.1 g/dL  Auto Neutrophil # : x  Auto Lymphocyte # : x  Auto Monocyte # : x  Auto Eosinophil # : x  Auto Basophil # : x  Auto Neutrophil % : x  Auto Lymphocyte % : x  Auto Monocyte % : x  Auto Eosinophil % : x  Auto Basophil % : x    11-16    138  |  101  |  14  ----------------------------<  230[H]  4.0   |  27  |  0.75    Ca    9.4      16 Nov 2024 05:41  Phos  2.9     11-16  Mg     2.0     11-16    TPro  6.7  /  Alb  3.4  /  TBili  0.5  /  DBili  x   /  AST  49[H]  /  ALT  43  /  AlkPhos  67  11-16    LIVER FUNCTIONS - ( 16 Nov 2024 05:41 )  Alb: 3.4 g/dL / Pro: 6.7 g/dL / ALK PHOS: 67 U/L / ALT: 43 U/L / AST: 49 U/L / GGT: x             Urinalysis Basic - ( 16 Nov 2024 05:41 )    Color: x / Appearance: x / SG: x / pH: x  Gluc: 230 mg/dL / Ketone: x  / Bili: x / Urobili: x   Blood: x / Protein: x / Nitrite: x   Leuk Esterase: x / RBC: x / WBC x   Sq Epi: x / Non Sq Epi: x / Bacteria: x

## 2024-11-16 NOTE — PROGRESS NOTE ADULT - ASSESSMENT
Assessment	  69F PMHx HTN, DM, glaucoma presents as transfer from [facility] due to seizures. Had 30 minutes of consecutive seizure activity, broke after Versed. Received Keppra 2500 mg total, intubated and sedated at time of arrival. Loaded with Vimpat 400 mg in NSCU. On exam, patient is intubated s/p propofol, currently on sedation with precedex. Not arousable, does not follow commands, left gaze preference. CT head remarkable for 1 cm lesion with mass effect. UA positive for UTI.    Impression:   New onset seizures focal seizures with secondary generalisation s/p focal status epilepticus. Likely 2/2 meningioma pressing on b/l frontal lobes    Recommendations:    [] continue Vimpat to 100 mg BID  [] Rescue medications: 1st line- Lorazepam 1 mg IV push for seizures lasting >3 minutes with vital sign changes. 2nd line- Briviact 100 mg IV push for seizures lasting >3 minutes refractory to lorazepam.  [] Continue vEEG monitoring  [] Neurosurgery following given the finding of meningioma, f/u with nsgy in regards to surgery  [] Decision to initiate Eliquis 2.5 mg BID for new DVT pending NSGY plan  [] Pelvic USG 11/13: 4.6 cm indeterminate heterogeneous solid mass in the region of the left ovary/adnexa. Patient can follow up with GYN outpatient  [] as per vascular CTA chest 11/13: No PE  [] IV ceftriaxone for E coli UTI, 3 day course completed on 11/13  [] Vital signs and neurological checks per unit protocol  [] Seizure, fall and aspiration precautions. Avoid sleep deprivation.  [] dvt PPX with SQH tid and  Pneumatic compression to L lower extremity only  [] PT/OT/PMR evaluated pt, recommended AR    Seizure precaution:  [] Given concern for seizure, advised the patient with regards to risks and driving privileges associated with the [place] Guidelines. Advised patient regarding the risk of seizures and general seizure safety recommendations including no driving, not to be bathing alone, climbing to high places and operating heavy machinery, until cleared by follow-up outpatient Neurology. Reinforced the importance of compliance with medications. Discussed sleep hygiene and the risks of sleep disruption. Discussed the risk of death associated with seizures / SUDEP.    Patient seen and plans discussed with EMU attending, Dr. Romero.  Assessment	  69F PMHx HTN, DM, glaucoma presents as transfer from [facility] due to seizures. Had 30 minutes of consecutive seizure activity, broke after Versed. Received Keppra 2500 mg total, intubated and sedated at time of arrival. Loaded with Vimpat 400 mg in NSCU. On exam, patient is intubated s/p propofol, currently on sedation with precedex. Not arousable, does not follow commands, left gaze preference. CT head remarkable for 1 cm lesion with mass effect. UA positive for UTI.    Impression:   New onset seizures focal seizures with secondary generalisation s/p focal status epilepticus. Likely 2/2 meningioma pressing on b/l frontal lobes    Recommendations:    [] continue Vimpat to 100 mg BID  [] Rescue medications: 1st line- Lorazepam 1 mg IV push for seizures lasting >3 minutes with vital sign changes. 2nd line- Briviact 100 mg IV push for seizures lasting >3 minutes refractory to lorazepam.  [] Continue vEEG monitoring  [] Neurosurgery following given the finding of meningioma, f/u with nsgy in regards to surgery  [] DVT treatment Lovenox 60 mg BID, decision to change to Eliquis 2.5 mg BID pending NSGY plan/ family decision on surgery  [] Pelvic USG 11/13: 4.6 cm indeterminate heterogeneous solid mass in the region of the left ovary/adnexa. Patient can follow up with GYN outpatient  [] as per vascular CTA chest 11/13: No PE  [] IV ceftriaxone for E coli UTI, 3 day course completed on 11/13  [] Vital signs and neurological checks per unit protocol  [] Seizure, fall and aspiration precautions. Avoid sleep deprivation.  [] dvt PPX with SQH tid and  Pneumatic compression to L lower extremity only  [] PT/OT/PMR evaluated pt, recommended AR    Seizure precaution:  [] Given concern for seizure, advised the patient with regards to risks and driving privileges associated with the [place] Guidelines. Advised patient regarding the risk of seizures and general seizure safety recommendations including no driving, not to be bathing alone, climbing to high places and operating heavy machinery, until cleared by follow-up outpatient Neurology. Reinforced the importance of compliance with medications. Discussed sleep hygiene and the risks of sleep disruption. Discussed the risk of death associated with seizures / SUDEP.    Patient seen and plans discussed with EMU attending, Dr. Romero.

## 2024-11-16 NOTE — PROGRESS NOTE ADULT - SUBJECTIVE AND OBJECTIVE BOX
Alert, Ox2 (self, place), EOS, PERRL, FC, hypophonic, VARGHESE spontaneous AG. EEG neg on Vimpat 100BID.     - Family and pt still discussing surgery vs medical management of seizures  - EEG neg  - CT PE neg  - Rpt Va Duplex LE vein today

## 2024-11-16 NOTE — CHART NOTE - NSCHARTNOTEFT_GEN_A_CORE
*****NEUROLOGY ACP EVENT NOTE*****    HPI:    69y (1955) woman with a PMHx significant for HTN, DM, glaucoma who presented to the ED as hospital transfer for seizures. Patient initially presented to [facility] earlier today after she was found by her son with seizure activity. Patient had reported 7 consecutive episodes of full body shaking without return to baseline, lasting approximately 30 minutes prior to EMS arrival. Patient received Ativan, Versed in the field. When she arrived in [facility], she was noted to be unresponsive with tonic movement of bilateral arms and eye deviation (unclear which side). She was intubated for airway protection, started on fentanyl and propofol and loaded with Keppra 1g followed by 1500 mg. CT head revealed 1 cm mass along the anterior skull base with mild mass effect. Patient was transferred to [facility] for further management. Upon arrival to [facility], patient was intubated and sedated, admitted to NSCU. In NSCU, patient loaded with Vimpat 400 mg, started maintenance dose Vimpat 200 mg BID. Currently undergoing vEEG evaluation.      EVENT: Called to patient's bedside by RN due to elevated blood sugar levels (451, 470); pt remains asymptomatic.       PHYSICAL EXAM:  Vital Signs Last 24 Hrs  T(C): 36.6 (16 Nov 2024 23:19), Max: 37 (16 Nov 2024 08:38)  T(F): 97.9 (16 Nov 2024 23:19), Max: 98.6 (16 Nov 2024 08:38)  HR: 61 (16 Nov 2024 23:19) (60 - 73)  BP: 135/67 (16 Nov 2024 23:19) (133/72 - 156/72)  BP(mean): --  RR: 18 (16 Nov 2024 23:19) (18 - 18)  SpO2: 97% (16 Nov 2024 23:19) (97% - 99%)    Parameters below as of 16 Nov 2024 23:19  Patient On (Oxygen Delivery Method): room air    General:  Constitutional: Female, appears stated age, nontoxic, not in distress    Head: Normocephalic;   Eyes: clear sclera;   Extremities: No cyanosis;   Resp: breathing comfortably     Neurological (>12):  MS: Awake, alert.  Oriented person place situation. Follows commands. Attends to examiner  Language: Speech is hypophonic, clear, fluent, good repetition,  comprehension, registration of words.  CNs: PERRL (R 3mm, L 3mm). VFF. EOMI. No disconjugate gaze, skew. V1-3 intact LT, No facial asymmetry b/l. Hearing grossly normal b/l. Tongue midline.     Motor - Normal bulk and tone throughout. No pronator drift.    L/R (out of 5)       Deltoid  5/5    Biceps   5/5      Triceps  5/5       5/5   L/R (out of 5)       Hip Flexion  5/5    Hip Extension  5/5  Knee Extension  5/5  Dorsiflexion  5/5      Plantar Flexion 5/5     Sensation: Intact to LT b/l. Cortical: Extinction on DSS (neglect): none  Reflexes L/R:  Biceps(C5) 2/2  BR(C6) 2/2   Triceps(C7)  2/2 Patellar(L4)   2/2   Coordination: No dysmetria to FTN b/l UE  Gait: Patient able to walk from bed to bedside chair without issue      IMPRESSION: New onset seizures focal seizures with secondary generalisation s/p focal status epilepticus. Likely 2/2 meningioma pressing on b/l frontal lobes      Plan:  -Home dose Lantus ordered (pt reports taking 15 units at bedtime).   -Stat endocrine consult requested (advised to continue Lantus dose, change to moderate ISS, change diet to consistent carbs, then f/u blood sugar in AM).    -Nursing Interventions: Continue Telemetry Monitor, Continuous Pulse Oximeter, Neurochecks q 4 hours, Continue frequent oral care and reorientation.       Time spent with patient: 60 minutes.

## 2024-11-16 NOTE — CHART NOTE - NSCHARTNOTESELECT_GEN_ALL_CORE
NGT placement/Event Note
Neurology/Event Note
Neurosurgery/Event Note
endocrine/Event Note
Transfer Note
vte risk assessment/Event Note

## 2024-11-17 DIAGNOSIS — E78.5 HYPERLIPIDEMIA, UNSPECIFIED: ICD-10-CM

## 2024-11-17 DIAGNOSIS — I10 ESSENTIAL (PRIMARY) HYPERTENSION: ICD-10-CM

## 2024-11-17 DIAGNOSIS — E11.65 TYPE 2 DIABETES MELLITUS WITH HYPERGLYCEMIA: ICD-10-CM

## 2024-11-17 LAB
ALBUMIN SERPL ELPH-MCNC: 3.4 G/DL — SIGNIFICANT CHANGE UP (ref 3.3–5)
ALP SERPL-CCNC: 68 U/L — SIGNIFICANT CHANGE UP (ref 40–120)
ALT FLD-CCNC: 65 U/L — HIGH (ref 10–45)
ANION GAP SERPL CALC-SCNC: 10 MMOL/L — SIGNIFICANT CHANGE UP (ref 5–17)
AST SERPL-CCNC: 66 U/L — HIGH (ref 10–40)
BILIRUB SERPL-MCNC: 0.4 MG/DL — SIGNIFICANT CHANGE UP (ref 0.2–1.2)
BUN SERPL-MCNC: 12 MG/DL — SIGNIFICANT CHANGE UP (ref 7–23)
CALCIUM SERPL-MCNC: 9.2 MG/DL — SIGNIFICANT CHANGE UP (ref 8.4–10.5)
CHLORIDE SERPL-SCNC: 106 MMOL/L — SIGNIFICANT CHANGE UP (ref 96–108)
CO2 SERPL-SCNC: 26 MMOL/L — SIGNIFICANT CHANGE UP (ref 22–31)
CREAT SERPL-MCNC: 0.71 MG/DL — SIGNIFICANT CHANGE UP (ref 0.5–1.3)
EGFR: 91 ML/MIN/1.73M2 — SIGNIFICANT CHANGE UP
GLUCOSE BLDC GLUCOMTR-MCNC: 116 MG/DL — HIGH (ref 70–99)
GLUCOSE BLDC GLUCOMTR-MCNC: 203 MG/DL — HIGH (ref 70–99)
GLUCOSE BLDC GLUCOMTR-MCNC: 258 MG/DL — HIGH (ref 70–99)
GLUCOSE BLDC GLUCOMTR-MCNC: 295 MG/DL — HIGH (ref 70–99)
GLUCOSE SERPL-MCNC: 121 MG/DL — HIGH (ref 70–99)
HCT VFR BLD CALC: 36.8 % — SIGNIFICANT CHANGE UP (ref 34.5–45)
HGB BLD-MCNC: 11.3 G/DL — LOW (ref 11.5–15.5)
MAGNESIUM SERPL-MCNC: 2 MG/DL — SIGNIFICANT CHANGE UP (ref 1.6–2.6)
MCHC RBC-ENTMCNC: 23.2 PG — LOW (ref 27–34)
MCHC RBC-ENTMCNC: 30.7 G/DL — LOW (ref 32–36)
MCV RBC AUTO: 75.4 FL — LOW (ref 80–100)
NRBC # BLD: 0 /100 WBCS — SIGNIFICANT CHANGE UP (ref 0–0)
PHOSPHATE SERPL-MCNC: 2.5 MG/DL — SIGNIFICANT CHANGE UP (ref 2.5–4.5)
PLATELET # BLD AUTO: 305 K/UL — SIGNIFICANT CHANGE UP (ref 150–400)
POTASSIUM SERPL-MCNC: 4 MMOL/L — SIGNIFICANT CHANGE UP (ref 3.5–5.3)
POTASSIUM SERPL-SCNC: 4 MMOL/L — SIGNIFICANT CHANGE UP (ref 3.5–5.3)
PROT SERPL-MCNC: 6.7 G/DL — SIGNIFICANT CHANGE UP (ref 6–8.3)
RBC # BLD: 4.88 M/UL — SIGNIFICANT CHANGE UP (ref 3.8–5.2)
RBC # FLD: 13.4 % — SIGNIFICANT CHANGE UP (ref 10.3–14.5)
SODIUM SERPL-SCNC: 142 MMOL/L — SIGNIFICANT CHANGE UP (ref 135–145)
WBC # BLD: 5.79 K/UL — SIGNIFICANT CHANGE UP (ref 3.8–10.5)
WBC # FLD AUTO: 5.79 K/UL — SIGNIFICANT CHANGE UP (ref 3.8–10.5)

## 2024-11-17 PROCEDURE — 99222 1ST HOSP IP/OBS MODERATE 55: CPT

## 2024-11-17 PROCEDURE — 99232 SBSQ HOSP IP/OBS MODERATE 35: CPT | Mod: GC

## 2024-11-17 RX ORDER — INSULIN GLARGINE 100 [IU]/ML
13 INJECTION, SOLUTION SUBCUTANEOUS AT BEDTIME
Refills: 0 | Status: DISCONTINUED | OUTPATIENT
Start: 2024-11-17 | End: 2024-11-18

## 2024-11-17 RX ADMIN — ACETAMINOPHEN 500MG 650 MILLIGRAM(S): 500 TABLET, COATED ORAL at 12:40

## 2024-11-17 RX ADMIN — Medication 6: at 11:06

## 2024-11-17 RX ADMIN — PANTOPRAZOLE SODIUM 40 MILLIGRAM(S): 40 TABLET, DELAYED RELEASE ORAL at 09:07

## 2024-11-17 RX ADMIN — ACETAMINOPHEN 500MG 650 MILLIGRAM(S): 500 TABLET, COATED ORAL at 12:07

## 2024-11-17 RX ADMIN — Medication 6: at 16:30

## 2024-11-17 RX ADMIN — ENOXAPARIN SODIUM 70 MILLIGRAM(S): 30 INJECTION SUBCUTANEOUS at 09:07

## 2024-11-17 RX ADMIN — INSULIN GLARGINE 13 UNIT(S): 100 INJECTION, SOLUTION SUBCUTANEOUS at 21:30

## 2024-11-17 RX ADMIN — LACOSAMIDE 100 MILLIGRAM(S): 10 INJECTION INTRAVENOUS at 17:32

## 2024-11-17 RX ADMIN — LACOSAMIDE 100 MILLIGRAM(S): 10 INJECTION INTRAVENOUS at 05:24

## 2024-11-17 RX ADMIN — ENOXAPARIN SODIUM 70 MILLIGRAM(S): 30 INJECTION SUBCUTANEOUS at 21:30

## 2024-11-17 NOTE — PROGRESS NOTE ADULT - SUBJECTIVE AND OBJECTIVE BOX
Patient seen and examined at bedside.    --Anticoagulation--  enoxaparin Injectable 70 milliGRAM(s) SubCutaneous every 12 hours    T(C): 36.6 (11-17-24 @ 05:00), Max: 37 (11-16-24 @ 08:38)  HR: 56 (11-17-24 @ 05:00) (56 - 73)  BP: 137/79 (11-17-24 @ 05:00) (133/72 - 156/72)  RR: 18 (11-16-24 @ 23:19) (18 - 18)  SpO2: 97% (11-16-24 @ 23:19) (97% - 99%)  Wt(kg): --    Exam:  alert, Ox2 (self, place), EOS, PERRL, FC, hypophonic, VARGHESE spontaneous AG, RUE drift

## 2024-11-17 NOTE — CONSULT NOTE ADULT - SUBJECTIVE AND OBJECTIVE BOX
HPI:  69F with PMH diabetes and glaucoma. Presented to [facility] 11/10 after son found her on the ground this morning presumed seizure-like activity. Patient received a total of 15 mg of Versed during transport from first ED, Patient was intubated in the ED, started on propofol and fentanyl infusions.  Patient received a total of 2500 of IV Keppra at outside hospital.  Patient had a CT of the head that showed "1 cm partially calcified mass along the anterior skull base which may represent a meningioma.  There is mild mass effect on the inferior medial left frontal lobe.  No acute intracranial hemorrhage or midline shift."  Remainder of history is unobtainable as patient is intubated and sedated. Patient transferred to [facility] for further interventions.    Admission Scores  GCS:  Sedation (10 Nov 2024 13:47)      DIABETES HX:  - History/diagnosis: T2DM for "many years"  - Microvascular complications: denies nephropathy, retinopathy, neuropathy  - Macrovascular complications: denies CAD or CVA  - Follows with: Dr. Neville Andrea?  - A1C with Estimated Average Glucose Result: 7.1 % (11-10-24)  - Home regimen: Lantus 15 units qhs, Janumet 50/1000 BID  - Adherence: reports good adherence  - Blood sugar: checks TID: AM fasting usually 70s, noon before lunch usually 140s, bedtime usually 120s.   - Hypoglycemia episodes: Reports occasional episodes, most recently about a week ago to 55, mild symptoms  - Diet/lifestyle: avoids added sugar and carbs    PAST MEDICAL & SURGICAL HISTORY:  Diabetes mellitus  Glaucoma  No significant past surgical history      Social History: no tobacco or alcohol use    Outpatient medications:  insulin (11-11-24 @ 14:12)  losartan (11-11-24 @ 14:12)  NIFEdipine (11-11-24 @ 14:12)      Inpatient medications:  MEDICATIONS  (STANDING):  dextrose 5%. 1000 milliLiter(s) (50 mL/Hr) IV Continuous <Continuous>  dextrose 5%. 1000 milliLiter(s) (100 mL/Hr) IV Continuous <Continuous>  dextrose 50% Injectable 25 Gram(s) IV Push once  dextrose 50% Injectable 12.5 Gram(s) IV Push once  dextrose 50% Injectable 25 Gram(s) IV Push once  enoxaparin Injectable 70 milliGRAM(s) SubCutaneous every 12 hours  glucagon  Injectable 1 milliGRAM(s) IntraMuscular once  insulin glargine Injectable (LANTUS) 15 Unit(s) SubCutaneous at bedtime  insulin lispro (ADMELOG) corrective regimen sliding scale   SubCutaneous three times a day before meals  insulin lispro (ADMELOG) corrective regimen sliding scale   SubCutaneous at bedtime  lacosamide 100 milliGRAM(s) Oral <User Schedule>  pantoprazole  Injectable 40 milliGRAM(s) IV Push daily  polyethylene glycol 3350 17 Gram(s) Oral two times a day  senna 2 Tablet(s) Oral at bedtime    MEDICATIONS  (PRN):  acetaminophen     Tablet .. 650 milliGRAM(s) Oral every 6 hours PRN Temp greater or equal to 38.5C (101.3F), Mild Pain (1 - 3)  dextrose Oral Gel 15 Gram(s) Oral once PRN Blood Glucose LESS THAN 70 milliGRAM(s)/deciliter  ondansetron Injectable 4 milliGRAM(s) IV Push every 6 hours PRN Nausea and/or Vomiting      Allergies    Allergy Status Unknown    Intolerances      Review of Systems:  Constitutional: No fever  Eyes: No blurry vision  Neuro: No tremors  HEENT: No pain  Cardiovascular: No chest pain, palpitations  Respiratory: No SOB, no cough  GI: No nausea, vomiting, abdominal pain  : No dysuria  Skin: no rash  Psych: no depression  Endocrine: no polyuria, polydipsia  Hem/lymph: no swelling  Osteoporosis: no fractures    ALL OTHER SYSTEMS REVIEWED AND NEGATIVE    PHYSICAL EXAM:  VITALS: T(C): 36.8 (11-17-24 @ 12:05)  T(F): 98.2 (11-17-24 @ 12:05), Max: 98.3 (11-17-24 @ 07:53)  HR: 65 (11-17-24 @ 12:05) (56 - 73)  BP: 170/72 (11-17-24 @ 12:05) (135/67 - 170/72)  RR:  (16 - 18)  SpO2:  (97% - 99%)  Wt(kg): 70 kg  GENERAL: NAD, well-groomed, well-developed  EYES: No proptosis, no lid lag, anicteric  HEENT:  Atraumatic, Normocephalic, moist mucous membranes  RESPIRATORY: Normal respiratory effort; no audible wheezing  SKIN: Dry, intact, No rashes or lesions  MUSCULOSKELETAL: Full range of motion, normal strength  NEURO: sensation intact, extraocular movements intact, no tremor  PSYCH: Alert and oriented x 3, normal affect, normal mood  CUSHING'S SIGNS: no striae                          11.3   5.79  )-----------( 305      ( 17 Nov 2024 07:44 )             36.8       11-17    142  |  106  |  12  ----------------------------<  121[H]  4.0   |  26  |  0.71    eGFR: 91    Ca    9.2      11-17  Mg     2.0     11-17  Phos  2.5     11-17    TPro  6.7  /  Alb  3.4  /  TBili  0.4  /  DBili  x   /  AST  66[H]  /  ALT  65[H]  /  AlkPhos  68  11-17      A1C with Estimated Average Glucose Result: 7.1 % (11-10-24 @ 15:29)      CAPILLARY BLOOD GLUCOSE      POCT Blood Glucose.: 295 mg/dL (17 Nov 2024 11:04)  POCT Blood Glucose.: 116 mg/dL (17 Nov 2024 07:30)  POCT Blood Glucose.: 470 mg/dL (16 Nov 2024 20:52)  POCT Blood Glucose.: 451 mg/dL (16 Nov 2024 20:51)  POCT Blood Glucose.: 182 mg/dL (16 Nov 2024 16:05)

## 2024-11-17 NOTE — CONSULT NOTE ADULT - REASON FOR ADMISSION
Admitted 11/10/24 for Seizures
Seizures
Admitted 11/10/24 for Seizures

## 2024-11-17 NOTE — SWALLOW BEDSIDE ASSESSMENT ADULT - SLP GENERAL OBSERVATIONS
Pt awake, alert and oriented 2-3 with mild cueing for date. Pt stating she would like to speak with her family re: potential surgery since it would involve the brain; SLP d/w Julien.

## 2024-11-17 NOTE — PROGRESS NOTE ADULT - ASSESSMENT
-Has persistent DVT, holding eliquis pending operative plan  -multiple episodes of hyperglycemia, endocrine following  -Tentative OR Monday 11/18, pending family discussion  -recommend dexamethasone for mass effect/edema  -Cleared for dc per Neurology

## 2024-11-17 NOTE — SWALLOW BEDSIDE ASSESSMENT ADULT - SLP PERTINENT HISTORY OF CURRENT PROBLEM
Pt is a 69y (1955) woman with a PMHx significant for HTN, DM, glaucoma who presented to the ED as hospital transfer for seizures. Patient initially presented to [facility] after she was found by her son with seizure activity. Per Neuro; IMPRESSION: New onset seizures focal seizures with secondary generalisation s/p focal status epilepticus. Likely 2/2 meningioma pressing on b/l frontal lobes

## 2024-11-17 NOTE — SWALLOW BEDSIDE ASSESSMENT ADULT - SWALLOW EVAL: DIAGNOSIS
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  No signs or symptoms of laryngeal penetration/aspiration evident with any consistency presented.  Pharyngeal swallow judged to be timely and laryngeal elevation was palpated.

## 2024-11-17 NOTE — PROGRESS NOTE ADULT - SUBJECTIVE AND OBJECTIVE BOX
*************************************  NEUROLOGY PROGRESS NOTE  **************************************    HIEU HERRMANN  Female  MRN-[id number]    Subjective: Patient seen and examined at bedside with Neurology team and attending     Interval History:    patient has no changes overnight    VITAL SIGNS:  Vital Signs Last 24 Hrs  T(C): 36.8 (17 Nov 2024 07:53), Max: 37 (16 Nov 2024 08:38)  T(F): 98.3 (17 Nov 2024 07:53), Max: 98.6 (16 Nov 2024 08:38)  HR: 70 (17 Nov 2024 07:53) (56 - 73)  BP: 149/70 (17 Nov 2024 07:53) (133/72 - 156/72)  BP(mean): --  RR: 16 (17 Nov 2024 07:53) (16 - 18)  SpO2: 98% (17 Nov 2024 07:53) (97% - 99%)    Parameters below as of 17 Nov 2024 07:53  Patient On (Oxygen Delivery Method): room air      General:  Constitutional: Female, appears stated age, nontoxic, not in distress,    Head: Normocephalic;   Eyes: clear sclera;   Extremities: No cyanosis;   Resp: breathing comfortably     Neurological (>12):  MS: Awake, alert.  Oriented person place situation. Follows commands. Attends to examiner  Language: Speech is hypophonic, clear, fluent, good repetition,  comprehension, registration of words.  CNs: PERRL (R 3mm, L 3mm). VFF. EOMI. No disconjugate gaze, skew. V1-3 intact LT, No facial asymmetry b/l. Hearing grossly normal b/l. Tongue midline.     Motor - Normal bulk and tone throughout. No pronator drift.    L/R (out of 5)       Deltoid  5/5    Biceps   5/5      Triceps  5/5       5/5   L/R (out of 5)       Hip Flexion  5/5    Hip Extension  5/5  Knee Extension  5/5  Dorsiflexion  5/5      Plantar Flexion 5/5     Sensation: Intact to LT b/l. Cortical: Extinction on DSS (neglect): none  Reflexes L/R:  Biceps(C5) 2/2  BR(C6) 2/2   Triceps(C7)  2/2 Patellar(L4)   2/2   Coordination: No dysmetria to FTN b/l UE  Gait: Patient able to walk from bed to bedside chair without issue      LABS:    CBC                       11.3   5.79  )-----------( 305      ( 17 Nov 2024 07:44 )             36.8     Chem 11-17    142  |  106  |  12  ----------------------------<  121[H]  4.0   |  26  |  0.71    Ca    9.2      17 Nov 2024 07:44  Phos  2.5     11-17  Mg     2.0     11-17    TPro  6.7  /  Alb  3.4  /  TBili  0.4  /  DBili  x   /  AST  66[H]  /  ALT  65[H]  /  AlkPhos  68  11-17    LFTs LIVER FUNCTIONS - ( 17 Nov 2024 07:44 )  Alb: 3.4 g/dL / Pro: 6.7 g/dL / ALK PHOS: 68 U/L / ALT: 65 U/L / AST: 66 U/L / GGT: x           Coagulopathy   Lipid Panel   A1c   Cardiac enzymes     U/A Urinalysis Basic - ( 17 Nov 2024 07:44 )    Color: x / Appearance: x / SG: x / pH: x  Gluc: 121 mg/dL / Ketone: x  / Bili: x / Urobili: x   Blood: x / Protein: x / Nitrite: x   Leuk Esterase: x / RBC: x / WBC x   Sq Epi: x / Non Sq Epi: x / Bacteria: x      CSF  Immunological  Other      RADIOLOGY & ADDITIONAL STUDIES:           *************************************  NEUROLOGY PROGRESS NOTE  **************************************    HIEU HERRMANN  Female  MRN-[id number]    Subjective: Patient seen and examined at bedside with Neurology team and attending     Interval History:    patient has no changes overnight. She does not want to undergo procedure while hospitalized.     VITAL SIGNS:  Vital Signs Last 24 Hrs  T(C): 36.8 (17 Nov 2024 07:53), Max: 37 (16 Nov 2024 08:38)  T(F): 98.3 (17 Nov 2024 07:53), Max: 98.6 (16 Nov 2024 08:38)  HR: 70 (17 Nov 2024 07:53) (56 - 73)  BP: 149/70 (17 Nov 2024 07:53) (133/72 - 156/72)  BP(mean): --  RR: 16 (17 Nov 2024 07:53) (16 - 18)  SpO2: 98% (17 Nov 2024 07:53) (97% - 99%)    Parameters below as of 17 Nov 2024 07:53  Patient On (Oxygen Delivery Method): room air      General:  Constitutional: Female, appears stated age, nontoxic, not in distress,    Head: Normocephalic;   Eyes: clear sclera;   Extremities: No cyanosis;   Resp: breathing comfortably     Neurological (>12):  MS: Awake, alert.  Oriented person place situation. Follows commands. Attends to examiner  Language: Speech is hypophonic, clear, fluent, good repetition,  comprehension, registration of words.  CNs: PERRL (R 3mm, L 3mm). VFF. EOMI. No disconjugate gaze, skew. V1-3 intact LT, No facial asymmetry b/l. Hearing grossly normal b/l. Tongue midline.     Motor - Normal bulk and tone throughout. No pronator drift.    L/R (out of 5)       Deltoid  5/5    Biceps   5/5      Triceps  5/5       5/5   L/R (out of 5)       Hip Flexion  5/5    Hip Extension  5/5  Knee Extension  5/5  Dorsiflexion  5/5      Plantar Flexion 5/5     Sensation: Intact to LT b/l. Cortical: Extinction on DSS (neglect): none  Reflexes L/R:  Biceps(C5) 2/2  BR(C6) 2/2   Triceps(C7)  2/2 Patellar(L4)   2/2   Coordination: No dysmetria to FTN b/l UE  Gait: Patient able to walk from bed to bedside chair without issue      LABS:    CBC                       11.3   5.79  )-----------( 305      ( 17 Nov 2024 07:44 )             36.8     Chem 11-17    142  |  106  |  12  ----------------------------<  121[H]  4.0   |  26  |  0.71    Ca    9.2      17 Nov 2024 07:44  Phos  2.5     11-17  Mg     2.0     11-17    TPro  6.7  /  Alb  3.4  /  TBili  0.4  /  DBili  x   /  AST  66[H]  /  ALT  65[H]  /  AlkPhos  68  11-17    LFTs LIVER FUNCTIONS - ( 17 Nov 2024 07:44 )  Alb: 3.4 g/dL / Pro: 6.7 g/dL / ALK PHOS: 68 U/L / ALT: 65 U/L / AST: 66 U/L / GGT: x           Coagulopathy   Lipid Panel   A1c   Cardiac enzymes     U/A Urinalysis Basic - ( 17 Nov 2024 07:44 )    Color: x / Appearance: x / SG: x / pH: x  Gluc: 121 mg/dL / Ketone: x  / Bili: x / Urobili: x   Blood: x / Protein: x / Nitrite: x   Leuk Esterase: x / RBC: x / WBC x   Sq Epi: x / Non Sq Epi: x / Bacteria: x      CSF  Immunological  Other      RADIOLOGY & ADDITIONAL STUDIES:

## 2024-11-17 NOTE — CONSULT NOTE ADULT - ASSESSMENT
The patient is a 69y Female with PMH of T2DM, glaucoma who presented to the hospital after a seizure and noted to have a meningioma.  Endocrinology consulted for hyperglycemia    #Type 2 Diabetes Mellitus with hyperglycemia  - Follows with: Dr. Andrea?  - A1C with Estimated Average Glucose Result: 7.1 % (11-10-24)  - home regimen: Lantus 15 units qhs, Janumet 50/1000 BID  - eGFR: 91 mL/min/1.73m2 (11-17-24)  - Weight (kg): 70 (11-10-24)  - glucose above goal, no evidence of DKA on labs. No steroids per EMR.       INPATIENT PLAN:  - Recommend continuing Lantus 15 units QHS   - Recommend Admelog 4 units TID before meals (HOLD if NPO or not eating)  - Recommend Low dose admelog correction scale TIDQAC and separate low dose scale QHS  - Please check FSG before meals and QHS, or q6h while NPO  - Inpatient glucose goals: <140 pre-meal, <180 random  - consistent carb diet when eating  - Note: neurosurgery recommending dexamethasone although patient tells me she may decline steroids. If she does get steroids, her insulin requirements are expected to increase.       DISCHARGE PLANNING:  - Discharge recs pending clinical course, will decrease Lantus from 15 to 13 units due to occasional hypoglycemia and AM fasting glucose consistently below goal. Continue Janumet.   - will need Endocrinology follow up with her provider.     #Hypertension  - Goal BP <130/80  - on losartan and nifedipine at home per med rec  - Management as per primary team  - check urine albumin level as outpatient    #Hyperlipidemia  - LDL goal <70  - check lipid panel as outpatient on a yearly basis    Pending discussion with attending physician.  INCOMPLETE NOTE    The patient is a 69y Female with PMH of T2DM, glaucoma who presented to the hospital after a seizure and noted to have a meningioma.  Endocrinology consulted for hyperglycemia    #Type 2 Diabetes Mellitus with hyperglycemia  - Follows with: Dr. Andrea?  - A1C with Estimated Average Glucose Result: 7.1 % (11-10-24)  - home regimen: Lantus 15 units qhs, Janumet 50/1000 BID  - eGFR: 91 mL/min/1.73m2 (11-17-24)  - Weight (kg): 70 (11-10-24)  - glucose above goal, no evidence of DKA on labs. No steroids per EMR.       INPATIENT PLAN:  - Recommend reducing Lantus to 13 units QHS   - Recommend Admelog 4 units TID before meals (HOLD if NPO or not eating)  - If dexamethasone is initiated (as mentioned in neurosurgical note from today), recommend proactively increasing insulin to Lantus 14 units, Admelog 7 units, continue moderate dose correction.   - Recommend Low dose admelog correction scale TIDQAC and separate low dose scale QHS  - Please check FSG before meals and QHS, or q6h while NPO  - Inpatient glucose goals: <140 pre-meal, <180 random  - consistent carb diet when eating      DISCHARGE PLANNING:  - Discharge recs pending clinical course, will decrease Lantus from 15 to 13 units due to occasional hypoglycemia and AM fasting glucose consistently below goal. Continue Janumet.   - will need Endocrinology follow up with her provider.     #Hypertension  - Goal BP <130/80  - on losartan and nifedipine at home per med rec  - Management as per primary team  - check urine albumin level as outpatient    #Hyperlipidemia  - LDL goal <70  - check lipid panel as outpatient on a yearly basis    Discussed with attending physician.  Kelton Amado DO   PGY-4 Endocrine Fellow  Can be reached via [employer] Teams.    For follow up questions, discharge recommendations or new consults, please email [email or web address] or [email or web address] ([facility]) or call the answering service at [phone] (weekdays); [phone] (nights/weekends).   For emergencies, please page fellow on call.

## 2024-11-17 NOTE — PROGRESS NOTE ADULT - ATTENDING COMMENTS
clear for epilepsy standpoint. will be dc to [facility] or transfer to [facility].  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
clear for epilepsy standpoint. will be dc to [facility] or transfer to [facility].  Patient expressed opinion that she did not want surgery at this time and will consider options after discharge. MRI imaging reviewed with patient at bedside.   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   continue vimpat 100 bid
improving  still mildly encephalopathic
plan as above  stable for dc home  may transfer to [facility]
clear for epilepsy standpoint. will be dc to [facility] or transfer to [facility].  Patient expressed opinion that she did not want surgery at this time and will consider options after discharge. MRI imaging reviewed at bedside with patient on SAT.   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   continue vimpat 100 bid
provoked seizures unlikely meningioma related  will dc her to rehab or home  if any [facility] intervention is considered she will be transferred to [facility]

## 2024-11-17 NOTE — PROGRESS NOTE ADULT - ASSESSMENT
69F PMHx HTN, DM, glaucoma presents as transfer from [facility] due to seizures. Had 30 minutes of consecutive seizure activity, broke after Versed. Received Keppra 2500 mg total, intubated and sedated at time of arrival. Loaded with Vimpat 400 mg in NSCU. On exam, patient is intubated s/p propofol, currently on sedation with precedex. Not arousable, does not follow commands, left gaze preference. CT head remarkable for 1 cm lesion with mass effect. UA positive for UTI.    Impression:   New onset seizures focal seizures with secondary generalisation s/p focal status epilepticus. Likely 2/2 meningioma pressing on b/l frontal lobes    Recommendations:    [] continue Vimpat to 100 mg BID  [] Rescue medications: 1st line- Lorazepam 1 mg IV push for seizures lasting >3 minutes with vital sign changes. 2nd line- Briviact 100 mg IV push for seizures lasting >3 minutes refractory to lorazepam.  [] Continue vEEG monitoring  [] Neurosurgery following given the finding of meningioma, f/u with nsgy in regards to surgery  [] DVT treatment Lovenox 60 mg BID, decision to change to Eliquis 2.5 mg BID pending NSGY plan/ family decision on surgery  [] Pelvic USG 11/13: 4.6 cm indeterminate heterogeneous solid mass in the region of the left ovary/adnexa. Patient can follow up with GYN outpatient  [] as per vascular CTA chest 11/13: No PE  [] IV ceftriaxone for E coli UTI, 3 day course completed on 11/13  [] Vital signs and neurological checks per unit protocol  [] Seizure, fall and aspiration precautions. Avoid sleep deprivation.  [] dvt PPX with SQH tid and  Pneumatic compression to L lower extremity only  [] PT/OT/PMR evaluated pt, recommended AR    Seizure precaution:  [] Given concern for seizure, advised the patient with regards to risks and driving privileges associated with the [place] Guidelines. Advised patient regarding the risk of seizures and general seizure safety recommendations including no driving, not to be bathing alone, climbing to high places and operating heavy machinery, until cleared by follow-up outpatient Neurology. Reinforced the importance of compliance with medications. Discussed sleep hygiene and the risks of sleep disruption. Discussed the risk of death associated with seizures / SUDEP.    Patient seen and plans discussed with EMU attending, Dr. Romero.  69F PMHx HTN, DM, glaucoma presents as transfer from [facility] due to seizures. Had 30 minutes of consecutive seizure activity, broke after Versed. Received Keppra 2500 mg total, intubated and sedated at time of arrival. Loaded with Vimpat 400 mg in NSCU. Patient was monitored in NSCU and was then transferred to EMU for further monitoring. Her antiepileptics were   Impression:   New onset seizures focal seizures with secondary generalisation s/p focal status epilepticus. Likely 2/2 meningioma pressing on b/l frontal lobes    Recommendations:    [] continue Vimpat to 100 mg BID  [] Rescue medications: 1st line- Lorazepam 1 mg IV push for seizures lasting >3 minutes with vital sign changes. 2nd line- Briviact 100 mg IV push for seizures lasting >3 minutes refractory to lorazepam.  [] Continue vEEG monitoring  [] Neurosurgery following given the finding of meningioma, f/u with nsgy in regards to surgery - likely outpatient as patient wants to defer - but pending discussion with family  [] DVT treatment Lovenox 60 mg BID, decision to change to Eliquis 2.5 mg BID pending NSGY plan/ family decision on surgery  [] Pelvic USG 11/13: 4.6 cm indeterminate heterogeneous solid mass in the region of the left ovary/adnexa. Patient can follow up with GYN outpatient  [] as per vascular CTA chest 11/13: No PE  [] IV ceftriaxone for E coli UTI, 3 day course completed on 11/13  [] Vital signs and neurological checks per unit protocol  [] Seizure, fall and aspiration precautions. Avoid sleep deprivation.  [] dvt PPX with SQH tid and  Pneumatic compression to L lower extremity only  [] PT/OT/PMR evaluated pt, recommended AR    Seizure precaution:  [] Given concern for seizure, advised the patient with regards to risks and driving privileges associated with the [place] Guidelines. Advised patient regarding the risk of seizures and general seizure safety recommendations including no driving, not to be bathing alone, climbing to high places and operating heavy machinery, until cleared by follow-up outpatient Neurology. Reinforced the importance of compliance with medications. Discussed sleep hygiene and the risks of sleep disruption. Discussed the risk of death associated with seizures / SUDEP.    Patient seen and plans discussed with EMU attending, Dr. Romero.

## 2024-11-17 NOTE — CONSULT NOTE ADULT - ATTENDING COMMENTS
probable focal status epilepticus( symptomatic?0  will yeni off propofol whiole on vimpat 200 bid iv and will monitor. rescue ativan/briviact
The patient is a 69y Female with PMH of T2DM, glaucoma who presented to the hospital after a seizure and noted to have a meningioma.  Endocrinology consulted for hyperglycemia. If starting dex please increase insulin doses as described above.

## 2024-11-17 NOTE — SWALLOW BEDSIDE ASSESSMENT ADULT - COMMENTS
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 No parenchymal signal abnormality of the adjacent frontal lobes. -Mild ventriculomegaly secondary findings raising possibility of normal pressure hydrocephalus.

## 2024-11-18 ENCOUNTER — INPATIENT (INPATIENT)
Facility: HOSPITAL | Age: 69
LOS: 8 days | Discharge: HOME | DRG: 55 | End: 2024-11-27
Attending: STUDENT IN AN ORGANIZED HEALTH CARE EDUCATION/TRAINING PROGRAM | Admitting: STUDENT IN AN ORGANIZED HEALTH CARE EDUCATION/TRAINING PROGRAM
Payer: COMMERCIAL

## 2024-11-18 VITALS
TEMPERATURE: 98 F | SYSTOLIC BLOOD PRESSURE: 149 MMHG | HEIGHT: 66 IN | RESPIRATION RATE: 16 BRPM | OXYGEN SATURATION: 96 % | DIASTOLIC BLOOD PRESSURE: 70 MMHG | WEIGHT: 181.88 LBS | HEART RATE: 71 BPM

## 2024-11-18 VITALS
TEMPERATURE: 99 F | SYSTOLIC BLOOD PRESSURE: 153 MMHG | DIASTOLIC BLOOD PRESSURE: 76 MMHG | OXYGEN SATURATION: 99 % | HEART RATE: 64 BPM | RESPIRATION RATE: 18 BRPM

## 2024-11-18 DIAGNOSIS — R73.9 HYPERGLYCEMIA, UNSPECIFIED: ICD-10-CM

## 2024-11-18 DIAGNOSIS — D32.0 BENIGN NEOPLASM OF CEREBRAL MENINGES: ICD-10-CM

## 2024-11-18 PROBLEM — H40.9 UNSPECIFIED GLAUCOMA: Chronic | Status: ACTIVE | Noted: 2024-11-10

## 2024-11-18 LAB
ALBUMIN SERPL ELPH-MCNC: 3.4 G/DL — SIGNIFICANT CHANGE UP (ref 3.3–5)
ALP SERPL-CCNC: 68 U/L — SIGNIFICANT CHANGE UP (ref 40–120)
ALT FLD-CCNC: 71 U/L — HIGH (ref 10–45)
ANION GAP SERPL CALC-SCNC: 11 MMOL/L — SIGNIFICANT CHANGE UP (ref 5–17)
AST SERPL-CCNC: 57 U/L — HIGH (ref 10–40)
BILIRUB SERPL-MCNC: 0.4 MG/DL — SIGNIFICANT CHANGE UP (ref 0.2–1.2)
BUN SERPL-MCNC: 15 MG/DL — SIGNIFICANT CHANGE UP (ref 7–23)
CALCIUM SERPL-MCNC: 9.4 MG/DL — SIGNIFICANT CHANGE UP (ref 8.4–10.5)
CHLORIDE SERPL-SCNC: 105 MMOL/L — SIGNIFICANT CHANGE UP (ref 96–108)
CO2 SERPL-SCNC: 22 MMOL/L — SIGNIFICANT CHANGE UP (ref 22–31)
CREAT SERPL-MCNC: 0.65 MG/DL — SIGNIFICANT CHANGE UP (ref 0.5–1.3)
EGFR: 95 ML/MIN/1.73M2 — SIGNIFICANT CHANGE UP
GLUCOSE BLDC GLUCOMTR-MCNC: 142 MG/DL — HIGH (ref 70–99)
GLUCOSE BLDC GLUCOMTR-MCNC: 221 MG/DL — HIGH (ref 70–99)
GLUCOSE BLDC GLUCOMTR-MCNC: 259 MG/DL — HIGH (ref 70–99)
GLUCOSE BLDC GLUCOMTR-MCNC: 275 MG/DL — HIGH (ref 70–99)
GLUCOSE SERPL-MCNC: 324 MG/DL — HIGH (ref 70–99)
LIDOCAIN IGE QN: 21 U/L — SIGNIFICANT CHANGE UP (ref 7–60)
POTASSIUM SERPL-MCNC: 4.3 MMOL/L — SIGNIFICANT CHANGE UP (ref 3.5–5.3)
POTASSIUM SERPL-SCNC: 4.3 MMOL/L — SIGNIFICANT CHANGE UP (ref 3.5–5.3)
PROT SERPL-MCNC: 7.1 G/DL — SIGNIFICANT CHANGE UP (ref 6–8.3)
SARS-COV-2 RNA SPEC QL NAA+PROBE: SIGNIFICANT CHANGE UP
SODIUM SERPL-SCNC: 138 MMOL/L — SIGNIFICANT CHANGE UP (ref 135–145)

## 2024-11-18 PROCEDURE — 85018 HEMOGLOBIN: CPT

## 2024-11-18 PROCEDURE — 84681 ASSAY OF C-PEPTIDE: CPT

## 2024-11-18 PROCEDURE — 96375 TX/PRO/DX INJ NEW DRUG ADDON: CPT

## 2024-11-18 PROCEDURE — C9254: CPT

## 2024-11-18 PROCEDURE — 80048 BASIC METABOLIC PNL TOTAL CA: CPT

## 2024-11-18 PROCEDURE — 87186 SC STD MICRODIL/AGAR DIL: CPT

## 2024-11-18 PROCEDURE — 84132 ASSAY OF SERUM POTASSIUM: CPT

## 2024-11-18 PROCEDURE — 99285 EMERGENCY DEPT VISIT HI MDM: CPT | Mod: 25

## 2024-11-18 PROCEDURE — 97167 OT EVAL HIGH COMPLEX 60 MIN: CPT

## 2024-11-18 PROCEDURE — 95714 VEEG EA 12-26 HR UNMNTR: CPT

## 2024-11-18 PROCEDURE — 84295 ASSAY OF SERUM SODIUM: CPT

## 2024-11-18 PROCEDURE — 85014 HEMATOCRIT: CPT

## 2024-11-18 PROCEDURE — 97530 THERAPEUTIC ACTIVITIES: CPT

## 2024-11-18 PROCEDURE — 71275 CT ANGIOGRAPHY CHEST: CPT | Mod: MC

## 2024-11-18 PROCEDURE — 82435 ASSAY OF BLOOD CHLORIDE: CPT

## 2024-11-18 PROCEDURE — 82962 GLUCOSE BLOOD TEST: CPT

## 2024-11-18 PROCEDURE — 84443 ASSAY THYROID STIM HORMONE: CPT

## 2024-11-18 PROCEDURE — 86901 BLOOD TYPING SEROLOGIC RH(D): CPT

## 2024-11-18 PROCEDURE — 97535 SELF CARE MNGMENT TRAINING: CPT

## 2024-11-18 PROCEDURE — 93975 VASCULAR STUDY: CPT

## 2024-11-18 PROCEDURE — 82803 BLOOD GASES ANY COMBINATION: CPT

## 2024-11-18 PROCEDURE — 85610 PROTHROMBIN TIME: CPT

## 2024-11-18 PROCEDURE — 85730 THROMBOPLASTIN TIME PARTIAL: CPT

## 2024-11-18 PROCEDURE — 71260 CT THORAX DX C+: CPT | Mod: MC

## 2024-11-18 PROCEDURE — 94002 VENT MGMT INPAT INIT DAY: CPT

## 2024-11-18 PROCEDURE — 86850 RBC ANTIBODY SCREEN: CPT

## 2024-11-18 PROCEDURE — 83690 ASSAY OF LIPASE: CPT

## 2024-11-18 PROCEDURE — 36415 COLL VENOUS BLD VENIPUNCTURE: CPT

## 2024-11-18 PROCEDURE — 70553 MRI BRAIN STEM W/O & W/DYE: CPT | Mod: MC

## 2024-11-18 PROCEDURE — 82533 TOTAL CORTISOL: CPT

## 2024-11-18 PROCEDURE — 97130 THER IVNTJ EA ADDL 15 MIN: CPT

## 2024-11-18 PROCEDURE — 93970 EXTREMITY STUDY: CPT

## 2024-11-18 PROCEDURE — 86900 BLOOD TYPING SEROLOGIC ABO: CPT

## 2024-11-18 PROCEDURE — 87641 MR-STAPH DNA AMP PROBE: CPT

## 2024-11-18 PROCEDURE — 83036 HEMOGLOBIN GLYCOSYLATED A1C: CPT

## 2024-11-18 PROCEDURE — 87640 STAPH A DNA AMP PROBE: CPT

## 2024-11-18 PROCEDURE — 95716 VEEG EA 12-26HR CONT MNTR: CPT

## 2024-11-18 PROCEDURE — 76856 US EXAM PELVIC COMPLETE: CPT

## 2024-11-18 PROCEDURE — 83735 ASSAY OF MAGNESIUM: CPT

## 2024-11-18 PROCEDURE — 97161 PT EVAL LOW COMPLEX 20 MIN: CPT

## 2024-11-18 PROCEDURE — 84100 ASSAY OF PHOSPHORUS: CPT

## 2024-11-18 PROCEDURE — 95700 EEG CONT REC W/VID EEG TECH: CPT

## 2024-11-18 PROCEDURE — 80307 DRUG TEST PRSMV CHEM ANLYZR: CPT

## 2024-11-18 PROCEDURE — A9585: CPT

## 2024-11-18 PROCEDURE — 82330 ASSAY OF CALCIUM: CPT

## 2024-11-18 PROCEDURE — 85025 COMPLETE CBC W/AUTO DIFF WBC: CPT

## 2024-11-18 PROCEDURE — 95711 VEEG 2-12 HR UNMONITORED: CPT

## 2024-11-18 PROCEDURE — 82010 KETONE BODYS QUAN: CPT

## 2024-11-18 PROCEDURE — 97110 THERAPEUTIC EXERCISES: CPT

## 2024-11-18 PROCEDURE — 99232 SBSQ HOSP IP/OBS MODERATE 35: CPT

## 2024-11-18 PROCEDURE — 94003 VENT MGMT INPAT SUBQ DAY: CPT

## 2024-11-18 PROCEDURE — 92610 EVALUATE SWALLOWING FUNCTION: CPT

## 2024-11-18 PROCEDURE — 97129 THER IVNTJ 1ST 15 MIN: CPT

## 2024-11-18 PROCEDURE — 80053 COMPREHEN METABOLIC PANEL: CPT

## 2024-11-18 PROCEDURE — 81001 URINALYSIS AUTO W/SCOPE: CPT

## 2024-11-18 PROCEDURE — 74177 CT ABD & PELVIS W/CONTRAST: CPT | Mod: MC

## 2024-11-18 PROCEDURE — 80076 HEPATIC FUNCTION PANEL: CPT

## 2024-11-18 PROCEDURE — 93306 TTE W/DOPPLER COMPLETE: CPT

## 2024-11-18 PROCEDURE — 87086 URINE CULTURE/COLONY COUNT: CPT

## 2024-11-18 PROCEDURE — 71045 X-RAY EXAM CHEST 1 VIEW: CPT

## 2024-11-18 PROCEDURE — 83605 ASSAY OF LACTIC ACID: CPT

## 2024-11-18 PROCEDURE — 96374 THER/PROPH/DIAG INJ IV PUSH: CPT

## 2024-11-18 PROCEDURE — 82947 ASSAY GLUCOSE BLOOD QUANT: CPT

## 2024-11-18 PROCEDURE — 99223 1ST HOSP IP/OBS HIGH 75: CPT | Mod: GC

## 2024-11-18 PROCEDURE — 76830 TRANSVAGINAL US NON-OB: CPT

## 2024-11-18 PROCEDURE — 87077 CULTURE AEROBIC IDENTIFY: CPT

## 2024-11-18 PROCEDURE — 97116 GAIT TRAINING THERAPY: CPT

## 2024-11-18 PROCEDURE — 85027 COMPLETE CBC AUTOMATED: CPT

## 2024-11-18 PROCEDURE — 83525 ASSAY OF INSULIN: CPT

## 2024-11-18 RX ORDER — NIFEDIPINE 10 MG
0 CAPSULE ORAL
Refills: 0 | DISCHARGE

## 2024-11-18 RX ORDER — DEXAMETHASONE 1.5 MG/1
TABLET ORAL
Refills: 0 | Status: DISCONTINUED | OUTPATIENT
Start: 2024-11-18 | End: 2024-11-19

## 2024-11-18 RX ORDER — LOSARTAN POTASSIUM 100 MG/1
0 TABLET, FILM COATED ORAL
Refills: 0 | DISCHARGE

## 2024-11-18 RX ORDER — LOSARTAN POTASSIUM 100 MG/1
1 TABLET, FILM COATED ORAL
Qty: 30 | Refills: 0
Start: 2024-11-18

## 2024-11-18 RX ORDER — APIXABAN 2.5 MG/1
2.5 TABLET, FILM COATED ORAL EVERY 12 HOURS
Refills: 0 | Status: DISCONTINUED | OUTPATIENT
Start: 2024-11-18 | End: 2024-11-18

## 2024-11-18 RX ORDER — GLUCAGON INJECTION, SOLUTION 0.5 MG/.1ML
1 INJECTION, SOLUTION SUBCUTANEOUS ONCE
Refills: 0 | Status: DISCONTINUED | OUTPATIENT
Start: 2024-11-18 | End: 2024-11-27

## 2024-11-18 RX ORDER — INSULIN GLARGINE 100 [IU]/ML
15 INJECTION, SOLUTION SUBCUTANEOUS
Qty: 1 | Refills: 0
Start: 2024-11-18

## 2024-11-18 RX ORDER — 0.9 % SODIUM CHLORIDE 0.9 %
1000 INTRAVENOUS SOLUTION INTRAVENOUS
Refills: 0 | Status: DISCONTINUED | OUTPATIENT
Start: 2024-11-18 | End: 2024-11-18

## 2024-11-18 RX ORDER — APIXABAN 2.5 MG/1
5 TABLET, FILM COATED ORAL
Refills: 0 | Status: DISCONTINUED | OUTPATIENT
Start: 2024-11-18 | End: 2024-11-27

## 2024-11-18 RX ORDER — INSULIN GLARGINE 100 [IU]/ML
15 INJECTION, SOLUTION SUBCUTANEOUS AT BEDTIME
Refills: 0 | Status: DISCONTINUED | OUTPATIENT
Start: 2024-11-18 | End: 2024-11-18

## 2024-11-18 RX ORDER — DEXAMETHASONE 1.5 MG/1
1 TABLET ORAL
Qty: 0 | Refills: 0 | DISCHARGE
Start: 2024-11-18

## 2024-11-18 RX ORDER — LOSARTAN POTASSIUM 100 MG/1
25 TABLET, FILM COATED ORAL DAILY
Refills: 0 | Status: DISCONTINUED | OUTPATIENT
Start: 2024-11-18 | End: 2024-11-18

## 2024-11-18 RX ORDER — INSULIN GLARGINE 100 [IU]/ML
15 INJECTION, SOLUTION SUBCUTANEOUS AT BEDTIME
Refills: 0 | Status: DISCONTINUED | OUTPATIENT
Start: 2024-11-18 | End: 2024-11-21

## 2024-11-18 RX ORDER — APIXABAN 2.5 MG/1
5 TABLET, FILM COATED ORAL EVERY 12 HOURS
Refills: 0 | Status: DISCONTINUED | OUTPATIENT
Start: 2024-11-18 | End: 2024-11-18

## 2024-11-18 RX ORDER — ACETAMINOPHEN 500MG 500 MG/1
650 TABLET, COATED ORAL EVERY 6 HOURS
Refills: 0 | Status: DISCONTINUED | OUTPATIENT
Start: 2024-11-18 | End: 2024-11-27

## 2024-11-18 RX ORDER — LOSARTAN POTASSIUM 100 MG/1
1 TABLET, FILM COATED ORAL
Qty: 0 | Refills: 0 | DISCHARGE
Start: 2024-11-18

## 2024-11-18 RX ORDER — POLYETHYLENE GLYCOL 3350 17 G/17G
17 POWDER, FOR SOLUTION ORAL
Qty: 0 | Refills: 0 | DISCHARGE
Start: 2024-11-18

## 2024-11-18 RX ORDER — INSULIN GLARGINE 100 [IU]/ML
12 INJECTION, SOLUTION SUBCUTANEOUS
Qty: 1 | Refills: 0
Start: 2024-11-18

## 2024-11-18 RX ORDER — PANTOPRAZOLE SODIUM 40 MG/1
1 TABLET, DELAYED RELEASE ORAL
Qty: 30 | Refills: 0
Start: 2024-11-18

## 2024-11-18 RX ORDER — SENNOSIDES 8.6 MG
2 TABLET ORAL AT BEDTIME
Refills: 0 | Status: DISCONTINUED | OUTPATIENT
Start: 2024-11-18 | End: 2024-11-27

## 2024-11-18 RX ORDER — LACOSAMIDE 10 MG/ML
100 INJECTION INTRAVENOUS
Refills: 0 | Status: DISCONTINUED | OUTPATIENT
Start: 2024-11-18 | End: 2024-11-27

## 2024-11-18 RX ORDER — LORAZEPAM 2 MG/1
1 TABLET ORAL ONCE
Refills: 0 | Status: DISCONTINUED | OUTPATIENT
Start: 2024-11-18 | End: 2024-11-18

## 2024-11-18 RX ORDER — POLYETHYLENE GLYCOL 3350 17 G/17G
17 POWDER, FOR SOLUTION ORAL
Refills: 0 | Status: DISCONTINUED | OUTPATIENT
Start: 2024-11-18 | End: 2024-11-26

## 2024-11-18 RX ORDER — LACOSAMIDE 10 MG/ML
1 INJECTION INTRAVENOUS
Qty: 0 | Refills: 0 | DISCHARGE
Start: 2024-11-18

## 2024-11-18 RX ORDER — SENNOSIDES 8.6 MG
2 TABLET ORAL
Qty: 0 | Refills: 0 | DISCHARGE
Start: 2024-11-18

## 2024-11-18 RX ORDER — PANTOPRAZOLE SODIUM 40 MG/1
40 TABLET, DELAYED RELEASE ORAL
Refills: 0 | Status: DISCONTINUED | OUTPATIENT
Start: 2024-11-18 | End: 2024-11-27

## 2024-11-18 RX ORDER — INSULIN GLARGINE 100 [IU]/ML
15 INJECTION, SOLUTION SUBCUTANEOUS
Qty: 0 | Refills: 0 | DISCHARGE
Start: 2024-11-18

## 2024-11-18 RX ORDER — APIXABAN 2.5 MG/1
1 TABLET, FILM COATED ORAL
Qty: 0 | Refills: 0 | DISCHARGE
Start: 2024-11-18

## 2024-11-18 RX ORDER — DEXAMETHASONE 1.5 MG/1
4 TABLET ORAL EVERY 6 HOURS
Refills: 0 | Status: DISCONTINUED | OUTPATIENT
Start: 2024-11-18 | End: 2024-11-18

## 2024-11-18 RX ORDER — LOSARTAN POTASSIUM 100 MG/1
25 TABLET, FILM COATED ORAL DAILY
Refills: 0 | Status: DISCONTINUED | OUTPATIENT
Start: 2024-11-18 | End: 2024-11-27

## 2024-11-18 RX ORDER — DEXAMETHASONE 1.5 MG/1
4 TABLET ORAL EVERY 6 HOURS
Refills: 0 | Status: DISCONTINUED | OUTPATIENT
Start: 2024-11-18 | End: 2024-11-19

## 2024-11-18 RX ADMIN — Medication 6: at 11:35

## 2024-11-18 RX ADMIN — PANTOPRAZOLE SODIUM 40 MILLIGRAM(S): 40 TABLET, DELAYED RELEASE ORAL at 11:11

## 2024-11-18 RX ADMIN — Medication 4: at 16:45

## 2024-11-18 RX ADMIN — Medication 2: at 23:36

## 2024-11-18 RX ADMIN — DEXAMETHASONE 4 MILLIGRAM(S): 1.5 TABLET ORAL at 11:11

## 2024-11-18 RX ADMIN — LACOSAMIDE 100 MILLIGRAM(S): 10 INJECTION INTRAVENOUS at 17:38

## 2024-11-18 RX ADMIN — ENOXAPARIN SODIUM 70 MILLIGRAM(S): 30 INJECTION SUBCUTANEOUS at 09:56

## 2024-11-18 RX ADMIN — APIXABAN 5 MILLIGRAM(S): 2.5 TABLET, FILM COATED ORAL at 23:59

## 2024-11-18 RX ADMIN — Medication 7 UNIT(S): at 16:46

## 2024-11-18 RX ADMIN — LOSARTAN POTASSIUM 25 MILLIGRAM(S): 100 TABLET, FILM COATED ORAL at 23:59

## 2024-11-18 RX ADMIN — LACOSAMIDE 100 MILLIGRAM(S): 10 INJECTION INTRAVENOUS at 05:08

## 2024-11-18 RX ADMIN — LOSARTAN POTASSIUM 25 MILLIGRAM(S): 100 TABLET, FILM COATED ORAL at 14:33

## 2024-11-18 RX ADMIN — DEXAMETHASONE 4 MILLIGRAM(S): 1.5 TABLET ORAL at 17:38

## 2024-11-18 RX ADMIN — Medication 4 UNIT(S): at 11:36

## 2024-11-18 NOTE — PROGRESS NOTE ADULT - ASSESSMENT
Assessment: 69F PMHx HTN, DM, glaucoma presents as transfer from [facility] due to seizures. Had 30 minutes of consecutive seizure activity, broke after Versed. Received Keppra 2500 mg total, intubated and sedated at time of arrival. Loaded with Vimpat 400 mg in NSCU. Patient was monitored in NSCU and was then transferred to EMU for further monitoring.  Patient was started on Vimpat 200mg BID, then decreased to 100mg BID. Patient was placed on VEEG 11/11-11/14: No seizures were recorded. 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 Vascular team has been following, recommending eliquis 2.5 mg BID if no surgery will be planned.    Impression:   New onset seizures focal seizures with secondary generalisation s/p focal status epilepticus. Likely 2/2 meningioma pressing on b/l frontal lobes    Recommendations:  [] continue Vimpat to 100 mg BID  [] Rescue medications: 1st line- Lorazepam 1 mg IV push for seizures lasting >3 minutes with vital sign changes. 2nd line- Briviact 100 mg IV push for seizures lasting >3 minutes refractory to lorazepam.  []  vEEG 11/11-11/14:   Focal cerebral dysfunction in the left hemisphere, structural in etiology.  Mild to moderate diffuse / multi-focal cerebral dysfunction, not specific in etiology. No seizures were recorded during this study.   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  likely outpatient as patient wants to defer - but pending discussion with family. 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 Vascular team has been following, recommending eliquis 2.5 mg BID if no surgery will be planned. pending NSGY plan/ family decision on surgery  [] Pelvic USG 11/13: 4.6 cm indeterminate heterogeneous solid mass in the region of the left ovary/adnexa. Patient can follow up with GYN outpatient  [] as per vascular CTA chest 11/13: No PE  [] IV ceftriaxone for E coli UTI, 3 day course completed on 11/13  [] Vital signs and neurological checks q4 hrs  [] Seizure, fall and aspiration precautions. Avoid sleep deprivation.  [] dvt PPX with SQH tid and  Pneumatic compression to L lower extremity only. DVTs in RLE  [] PT/OT/PMR evaluated pt, recommended AR    Seizure precaution:  [] Given concern for seizure, advised the patient with regards to risks and driving privileges associated with the [place] Guidelines. Advised patient regarding the risk of seizures and general seizure safety recommendations including no driving, not to be bathing alone, climbing to high places and operating heavy machinery, until cleared by follow-up outpatient Neurology. Reinforced the importance of compliance with medications. Discussed sleep hygiene and the risks of sleep disruption. Discussed the risk of death associated with seizures / SUDEP.    Patient seen and plans discussed with Epilepsy attending, Dr. Suazo  Assessment: 69F PMHx HTN, DM, glaucoma presents as transfer from [facility] due to seizures. Had 30 minutes of consecutive seizure activity, broke after Versed. Received Keppra 2500 mg total, intubated and sedated at time of arrival. Loaded with Vimpat 400 mg in NSCU. Patient was monitored in NSCU and was then transferred to EMU for further monitoring.  Patient was started on Vimpat 200mg BID, then decreased to 100mg BID. Patient was placed on VEEG 11/11-11/14: No seizures were recorded. 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 Vascular team has been following, recommending eliquis 2.5 mg BID if no surgery will be planned.    Impression:   New onset seizures focal seizures with secondary generalisation s/p focal status epilepticus. Likely 2/2 meningioma pressing on b/l frontal lobes    Recommendations:  [] continue Vimpat to 100 mg BID  [] Rescue medications: 1st line- Lorazepam 1 mg IV push for seizures lasting >3 minutes with vital sign changes. 2nd line- Briviact 100 mg IV push for seizures lasting >3 minutes refractory to lorazepam.  []  vEEG 11/11-11/14:   Focal cerebral dysfunction in the left hemisphere, structural in etiology.  Mild to moderate diffuse / multi-focal cerebral dysfunction, not specific in etiology. No seizures were recorded during this study.   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 Vascular team has been following, recommend to increase Eliquis to 5 mg BID.  [] Pelvic USG 11/13: 4.6 cm indeterminate heterogeneous solid mass in the region of the left ovary/adnexa. Patient can follow up with GYN outpatient  [] as per vascular CTA chest 11/13: No PE  [] IV ceftriaxone for E coli UTI, 3 day course completed on 11/13  [] Vital signs and neurological checks q4 hrs  [] Seizure, fall and aspiration precautions. Avoid sleep deprivation.  [] dvt PPX with SQH tid and  Pneumatic compression to L lower extremity only. DVTs in RLE  [] PT/OT/PMR evaluated pt, recommended AR, planning for dc today    Seizure precaution:  [] Given concern for seizure, advised the patient with regards to risks and driving privileges associated with the [place] Guidelines. Advised patient regarding the risk of seizures and general seizure safety recommendations including no driving, not to be bathing alone, climbing to high places and operating heavy machinery, until cleared by follow-up outpatient Neurology. Reinforced the importance of compliance with medications. Discussed sleep hygiene and the risks of sleep disruption. Discussed the risk of death associated with seizures / SUDEP.    Patient seen and plans discussed with Epilepsy attending, Dr. Suazo

## 2024-11-18 NOTE — PROGRESS NOTE ADULT - REASON FOR ADMISSION
Admitted 11/10/24 for Seizures
Seizures
Seizures
Admitted 11/10/24 for Seizures
Seizures

## 2024-11-18 NOTE — PROGRESS NOTE ADULT - PROVIDER SPECIALTY LIST ADULT
Neurology
Neurosurgery
Rehab Medicine
Vascular Cardiology
NSICU
Neurology
Neurosurgery
Neurosurgery
Neurology
Neurology
Neurosurgery
Neurosurgery
NSICU
Neurology
Neurosurgery
Neurosurgery
Endocrinology

## 2024-11-18 NOTE — H&P ADULT - NSHPPHYSICALEXAM_GEN_ALL_CORE
PHYSICAL EXAM  VITALS  T(C): 36.4 (11-18-24 @ 20:11), Max: 37.3 (11-18-24 @ 17:01)  HR: 71 (11-18-24 @ 20:11) (53 - 74)  BP: 153/76 (11-18-24 @ 17:01) (133/64 - 158/79)  RR: 16 (11-18-24 @ 20:11) (16 - 18)  SpO2: 96% (11-18-24 @ 20:11) (96% - 100%)    Gen - NAD, Comfortable  HEENT - NCAT, EOMI, MMM, PERRLA, Normal Conjunctivae  Neck - Supple, No limited ROM  Pulm - CTAB, No wheeze, No rhonchi, No crackles  Cardiovascular - RRR, S1S2, No murmurs  Chest - good chest expansion, good respiratory effort  Abdomen - Soft, NT/ND, +BS  Extremities - No C/C/E, no calf tenderness, +RLE DVT  Neuro-     Cognitive - awake, alert, oriented to person, place, date, year, and situation.  Able  to follow command     Communication - Fluent, Comprehensible, No dysarthria, No aphasia      Attention: Intact, able to state days of week chronologically and backwards. Able to perform simple additions and subtractions     Memory: Recall 3 objects immediate and 3 min later,     Cranial Nerves -No facial asymmetry, Tongue midline, EOMI, Shoulder shrug intact     Motor -                     LEFT    UE - ShAB 5/5, EF 5/5, EE 5/5,  5/5                    RIGHT UE - ShAB 5/5, EF 5/5, EE 5/5,   5/5                    LEFT    LE - HF 4/5, KE 4/5, DF 5/5, PF 5/5                    RIGHT LE - HF 5/5, KE 5/5, DF 5/5, PF 5/5        Sensory - Intact to LT      Coordination - FTN/HTS intact      Tone - Normal  Psychiatric - Mood stable, Affect WNL  Skin:  all skin intact , generalized bruising PHYSICAL EXAM  VITALS  T(C): 36.4 (11-18-24 @ 20:11), Max: 37.3 (11-18-24 @ 17:01)  HR: 71 (11-18-24 @ 20:11) (53 - 74)  BP: 153/76 (11-18-24 @ 17:01) (133/64 - 158/79)  RR: 16 (11-18-24 @ 20:11) (16 - 18)  SpO2: 96% (11-18-24 @ 20:11) (96% - 100%)    Gen - NAD, Comfortable  HEENT - NCAT, EOMI, MMM, PERRLA - L reactive, but less reactive compared to R, Normal Conjunctivae  Neck - Supple, No limited ROM  Pulm - CTAB, No wheeze, No rhonchi, No crackles  Cardiovascular - RRR, S1S2, No murmurs  Chest - good chest expansion, good respiratory effort  Abdomen - Soft, NT/ND, +BS  Extremities - No cyanosis or pedal edema, left without calf tenderness (right not tested d/t DVT)  Neuro-     Cognitive - awake, alert, oriented x 4.  Able  to follow command + answer questions appropriately     Communication - Fluent, Comprehensible, No dysarthria, No aphasia, able to repeat; identify 2 obj/func 2/2     Attention: Intact, able to state days of week chronologically + backwards. Able to perform simple additions and subtractions     Memory: Recall 3 objects immediate and 3 min later     Cranial Nerves -hearing intact. No facial asymmetry. Tongue midline + good lateral movement (no thrush), EOMI, Shoulder shrug intact     Motor -                     LEFT    UE - ShAB 5/5, EF 5/5, EE 5/5,  5/5                    RIGHT UE - ShAB 5/5, EF 5/5, EE 5/5,   5/5                    LEFT    LE - HF 4/5, KE 4/5, DF 5/5, PF 5/5                    RIGHT LE - HF 5/5, KE 5/5, DF 5/5, PF 5/5        Sensory - Intact to LT      Coordination - FTN/HTS intact      Tone - Normal  Psychiatric - Mood stable, Affect WNL  Skin:  all skin intact , generalized bruising PHYSICAL EXAM  VITALS  T(C): 36.4 (11-18-24 @ 20:11), Max: 37.3 (11-18-24 @ 17:01)  HR: 71 (11-18-24 @ 20:11) (53 - 74)  BP: 153/76 (11-18-24 @ 17:01) (133/64 - 158/79)  RR: 16 (11-18-24 @ 20:11) (16 - 18)  SpO2: 96% (11-18-24 @ 20:11) (96% - 100%)    Gen - NAD, Comfortable  HEENT - NCAT, EOMI, PERRLA - L reactive, but less reactive compared to R, Normal Conjunctivae  Neck - Supple, No limited ROM  Pulm - CTAB, No wheeze, No rhonchi, No crackles  Cardiovascular - RRR, S1S2, No murmurs  Chest - good chest expansion, good respiratory effort  Abdomen - Soft, NT/ND, +BS  Extremities - No cyanosis or pedal edema, left without calf tenderness (right not tested d/t DVT)  Neuro-     Cognitive - awake, alert, oriented x 4.  Able  to follow command + answer questions appropriately     Communication - Fluent, Comprehensible, No dysarthria, No aphasia, able to repeat; identify 2 obj/func 2/2     Attention: Intact, able to state days of week chronologically + backwards. Able to perform simple additions and subtractions     Memory: Recall 3 objects immediate and 3 min later     Cranial Nerves -hearing intact. No facial asymmetry. Tongue midline + good lateral movement (no thrush), EOMI, Shoulder shrug intact     Motor -                     LEFT    UE - ShAB 5/5, EF 5/5, EE 5/5,  5/5                    RIGHT UE - ShAB 5/5, EF 5/5, EE 5/5,   5/5                    LEFT    LE - HF 4/5, KE 4/5, DF 5/5, PF 5/5                    RIGHT LE - HF 5/5, KE 5/5, DF 5/5, PF 5/5        Sensory - Intact to LT      Coordination - FTN/HTS intact      Tone - Normal  Psychiatric - Mood stable, Affect WNL  Skin:  all skin intact , generalized bruising

## 2024-11-18 NOTE — H&P ADULT - NSHPREVIEWOFSYSTEMS_GEN_ALL_CORE
REVIEW OF SYSTEMS  Constitutional: No fever, No Chills, No fatigue  HEENT: No eye pain, +glaucoma/blurred vision, No difficulty hearing  Pulm: No cough,  No shortness of breath  Cardio: No chest pain, No palpitations  GI:  No abdominal pain, No nausea, No vomiting, No diarrhea, No constipation LBM 11/18  : No dysuria, No frequency, No hematuria  Neuro: No headaches, No memory loss, + LLE weakness, No numbness/tingling, No tremors  Skin: No itching, No rashes, No lesions   Endo: No temperature intolerance  MSK: No joint pain, No joint swelling, No muscle pain, No Neck pain,  No back pain  Psych:  No depression, No anxiety REVIEW OF SYSTEMS  Constitutional: No fever, No Chills, No fatigue  HEENT: No eye pain, +glaucoma/blurred vision, No difficulty hearing  Pulm: No cough,  No shortness of breath  Cardio: No chest pain, No palpitations  GI:  No abdominal pain, No nausea, No vomiting, No diarrhea, No constipation LBM 11/18  : No dysuria, No frequency, No hematuria  Neuro: No headaches, No memory loss, + LLE weakness, No numbness/tingling, No tremors  Skin: No itching, No rashes, No lesions   Endo: No temperature intolerance  MSK: Bilateral knee pain pain, No joint swelling, No muscle pain, No Neck pain,  No back pain  Psych:  No depression, No anxiety

## 2024-11-18 NOTE — PROGRESS NOTE ADULT - SUBJECTIVE AND OBJECTIVE BOX
Neurology Progress Note    SUBJECTIVE/INTERVAL EVENT: No events overnight.  No new neurologic complaints.      ROS: Otherwise negative.       MEDICATIONS  (STANDING):  dextrose 5%. 1000 milliLiter(s) (50 mL/Hr) IV Continuous <Continuous>  dextrose 5%. 1000 milliLiter(s) (100 mL/Hr) IV Continuous <Continuous>  dextrose 50% Injectable 25 Gram(s) IV Push once  dextrose 50% Injectable 12.5 Gram(s) IV Push once  dextrose 50% Injectable 25 Gram(s) IV Push once  enoxaparin Injectable 70 milliGRAM(s) SubCutaneous every 12 hours  glucagon  Injectable 1 milliGRAM(s) IntraMuscular once  insulin glargine Injectable (LANTUS) 13 Unit(s) SubCutaneous at bedtime  insulin lispro (ADMELOG) corrective regimen sliding scale   SubCutaneous three times a day before meals  insulin lispro (ADMELOG) corrective regimen sliding scale   SubCutaneous at bedtime  insulin lispro Injectable (ADMELOG) 4 Unit(s) SubCutaneous three times a day before meals  lacosamide 100 milliGRAM(s) Oral <User Schedule>  pantoprazole  Injectable 40 milliGRAM(s) IV Push daily  polyethylene glycol 3350 17 Gram(s) Oral two times a day  senna 2 Tablet(s) Oral at bedtime        Physical Exam: Neurological Exam:  	Mental Status: Orientated to self, date and place.  Attention intact.  No dysarthria, aphasia or neglect.  	Cranial Nerves: PERRL, EOMI, no nystagmus or diplopia. No facial asymmetry.  Hearing intact to finger rub bilaterally.  Tongue, uvula and palate midline.  Sternocleidomastoid and Trapezius intact bilaterally  	Motor: moving all 4 extremities equally w 5/5 strength  	Tone: normal.                  	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 No parenchymal signal abnormality of the adjacent frontal lobes.-Mild ventriculomegaly secondary findings raising possibility of normal pressure hydrocephalus.    VA Duplex Lower Ext Vein Scan, Bilat (11.15.24 @ 11:15) Persistent right lower extremity below the knee DVT, with new finding of nonocclusive thrombus in the peroneal vein. No evidence of clot propagation above the knee.      CT Angio Chest PE Protocol w/ IV Cont (11.13.24 @ 16:38):No pulmonary embolism.  No acute intrathoracic findings.      US Doppler Pelvis (11.13.24 @ 15:59) : 4.6 cm indeterminate heterogeneous solid mass in the region of the left ovary/adnexa and adjacent to the uterus of uncertain origin. Differential   diagnosis includes solid ovarian neoplasm versus serosal fibroid.       EEG:  -- DAY 3 	  START: 11/13/2024  08:00 AM  END: 	11/14/2024  10:32 AM  DURATION (HRS): ?24 hr 29 min (EEG disconnected 14:43 to 16:45)     IMPRESSION/CLINICAL CORRELATE:     This is an abnormal EEG record.      - Triphasic waves are typically seen in the setting of metabolic encephalopathy.      - Focal cerebral dysfunction in the left hemisphere, structural in etiology.     - Mild to moderate diffuse / multi-focal cerebral dysfunction, not specific in etiology.     No seizures were recorded during this study.        Neurology Progress Note    SUBJECTIVE/INTERVAL EVENT: No events overnight.  No new neurologic complaints.    Some pains/aches in knees, hip reported, feels mildly unsteady  States Mental clarity is improved    ROS: Otherwise negative.       MEDICATIONS  (STANDING):  enoxaparin Injectable 70 milliGRAM(s) SubCutaneous every 12 hours  glucagon  Injectable 1 milliGRAM(s) IntraMuscular once  insulin glargine Injectable (LANTUS) 13 Unit(s) SubCutaneous at bedtime  insulin lispro (ADMELOG) corrective regimen sliding scale   SubCutaneous three times a day before meals  insulin lispro (ADMELOG) corrective regimen sliding scale   SubCutaneous at bedtime  insulin lispro Injectable (ADMELOG) 4 Unit(s) SubCutaneous three times a day before meals  lacosamide 100 milliGRAM(s) Oral <User Schedule>  pantoprazole  Injectable 40 milliGRAM(s) IV Push daily  polyethylene glycol 3350 17 Gram(s) Oral two times a day  senna 2 Tablet(s) Oral at bedtime        Physical Exam: Neurological Exam:  	Mental Status: Orientated to self, date and place.  Attention intact.  No dysarthria, aphasia or neglect.  	Cranial Nerves: PERRL, EOMI, no nystagmus or diplopia. No facial asymmetry.  Hearing intact to finger rub bilaterally.  Tongue, uvula and palate midline.  Sternocleidomastoid and Trapezius intact bilaterally  	Motor: moving all 4 extremities equally w 5/5 strength  	Tone: normal.                  	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 No parenchymal signal abnormality of the adjacent frontal lobes.-Mild ventriculomegaly secondary findings raising possibility of normal pressure hydrocephalus.    VA Duplex Lower Ext Vein Scan, Bilat (11.15.24 @ 11:15) Persistent right lower extremity below the knee DVT, with new finding of nonocclusive thrombus in the peroneal vein. No evidence of clot propagation above the knee.      CT Angio Chest PE Protocol w/ IV Cont (11.13.24 @ 16:38):No pulmonary embolism.  No acute intrathoracic findings.      US Doppler Pelvis (11.13.24 @ 15:59) : 4.6 cm indeterminate heterogeneous solid mass in the region of the left ovary/adnexa and adjacent to the uterus of uncertain origin. Differential   diagnosis includes solid ovarian neoplasm versus serosal fibroid.       EEG:  -- DAY 3 	  START: 11/13/2024  08:00 AM  END: 	11/14/2024  10:32 AM  DURATION (HRS): ?24 hr 29 min (EEG disconnected 14:43 to 16:45)     IMPRESSION/CLINICAL CORRELATE:     This is an abnormal EEG record.      - Triphasic waves are typically seen in the setting of metabolic encephalopathy.      - Focal cerebral dysfunction in the left hemisphere, structural in etiology.     - Mild to moderate diffuse / multi-focal cerebral dysfunction, not specific in etiology.     No seizures were recorded during this study.

## 2024-11-18 NOTE — DISCHARGE NOTE NURSING/CASE MANAGEMENT/SOCIAL WORK - PATIENT PORTAL LINK FT
You can access the FollowMyHealth Patient Portal offered by [facility] by registering at the following website: [email or web address]. By joining [employer]’s FollowMyHealth portal, you will also be able to view your health information using other applications (apps) compatible with our system.

## 2024-11-18 NOTE — H&P ADULT - ASSESSMENT
Assessment/Plan:  HIEU HERRMANN is a 69y with a history of type 2 diabetes and glaucoma, who presented to [facility] on 11/10 after son found her on the ground + presumed seizure-like activity. 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 To be managed on AED + steroid.  Hospital course by right soleal + peroneal DVT.  s/p  therapeutic Lovenox, now transitioned to oral AC.  Now admitted to [facility] after for initiation of a multidisciplinary rehab program consisting focused on functional mobility, transfers and ADLs (activities of daily living).    #B/L frontal Meningioma - complicated by seizure   - Patient deferred surgery; steroid taper for mass effect  - Decadron: 4mg q6 x1D > 3 q6 x 1D > 3 q8 x 1D > 4 BID x 1D > 2 TID x 1D >2 BID x 1D> 1 BID x 1D > 1 QD til outpatient - TAPER ORDERED ***DO NOT CHANGE***  - EEG without recorded seizure 11/11-14  - Seizure ppx: Vimpat 100 BID +  Rescue med: 1st line- Lorazepam 1 mg IV push for seizures lasting >3 minutes with vital sign changes. 2nd line- Briviact 100 mg IV push for seizures lasting >3 minutes refractory to lorazepam.  - Will consider neuro guidance on Seizure and steroid taper management  Deficits:   Comprehensive Multidisciplinary Rehab Program: 3 hours a day ( 1 hr PT, 1 hr OT, 1 hr SLP), 5 days a week.    #DVTs: Right soleal occlusive + right peroneal (nonocculsive)  - CTA neg for PE  - S/p therapeutic Lovenox > now transition to oral since there's no planned surgery  - Eliquis 5 BID    #HTN  - Losartan 25    #Hyperglycemia (on steroid)  #Type 2 Diabetes (A1C 7.1)   OP Dr. Andrea? > Lantus 15 HS, Janumet 50/1000 BID  - Lantus 14 HS  - Admelog 7 premeal  - FS + ISS  (discharge endo rec from 11/17: Lantus 13 + continue with Janumet)    #Enlarged Left Adnexa  - Outpatient GYN    #Pain Management:  Analgesic: Tylenol PRN  Avoid sedating medications that may interfere with cognitive recovery    #GI/Bowel:  Senna QHS, Miralax BID  GI ppx: Protonix    #/Bladder:   Prior UTI (Ecoli) - s/p CTX x3 days (completed on 11/13)  - PVR q 8 hours (SC if > 400)    #Skin/Pressure Injury:   - Skin assessment on admission: ***  - Skin barrier cream as needed  - Nursing to monitor skin Qshift    #Diet:  Dysphagia: SLP consult for swallow function evaluation and treatment  Current Diet: Regular   [X] Aspiration Precautions  Nutrition consult     #Precautions / PROPHYLAXIS:   - Falls, Seizure   - Lungs: Aspiration, Incentive Spirometer     ---------------  Code Status/Emergency Contact:    Outpatient Follow-up (Specialty/Name of physician):  Yomaira López  Neurology  [address], [address]  [place] [address]  Phone: [phone]  Fax: [phone]  Follow Up Time: 2 weeks    Neville Chaudhary  Neurosurgery  [address] [place] [address]  Phone: [phone]  Fax: [phone]  Follow Up Time: 2 weeks    Hal Carvajal  Vascular Medicine  [address] [place] [address]  Phone: [phone]  Fax: [phone]  Follow Up Time: 2 weeks  --------------  Goals: Safe discharge to Home*****  Estimated Length of Stay: 10-14 days  Rehab Potential: Good  Medical Prognosis: Good  Estimated Disposition: Home with Home Care  ---------------    PRESCREEN COMPARISON:  I have reviewed the prescreen information and I have found no relevant changes between the preadmission screening and my post admission evaluation.    RATIONALE FOR INPATIENT ADMISSION: Patient demonstrates the following:  [X] Medically appropriate for rehabilitation admission  [X] Has attainable rehab goals with an appropriate initial discharge plan  [X]Has rehabilitation potential (expected to make a significant improvement within a reasonable period of time)  [X] Requires close medical management by a rehab physician, rehab nursing care, Hospitalist and comprehensive interdisciplinary team (including PT, OT and/or SLP, Prosthetics and Orthotics)   Assessment/Plan:  HIEU HERRMANN is a 69y with a history of type 2 diabetes and glaucoma, who presented to [facility] on 11/10 after son found her on the ground + presumed seizure-like activity. 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 To be managed on AED + steroid.  Hospital course by right soleal + peroneal DVT.  s/p  therapeutic Lovenox, now transitioned to oral AC.  Now admitted to [facility] after for initiation of a multidisciplinary rehab program consisting focused on functional mobility, transfers and ADLs (activities of daily living).    #B/L frontal Meningioma - complicated by seizure   - Patient deferred surgery; steroid taper for mass effect  - Decadron: 4mg q6 x1D > 3 q6 x 1D > 3 q8 x 1D > 4 BID x 1D > 2 TID x 1D >2 BID x 1D> 1 BID x 1D > 1 QD til outpatient - TAPER ORDERED ***DO NOT CHANGE***  - EEG without recorded seizure 11/11-14  - Seizure ppx: Vimpat 100 BID +  Rescue med: 1st line- Lorazepam 1 mg IV push for seizures lasting >3 minutes with vital sign changes. 2nd line- Briviact 100 mg IV push for seizures lasting >3 minutes refractory to lorazepam.  - Will consider neuro guidance on Seizure and steroid taper management  Deficits:   Comprehensive Multidisciplinary Rehab Program: 3 hours a day ( 1 hr PT, 1 hr OT, 1 hr SLP), 5 days a week.    #DVTs: Right soleal occlusive + right peroneal (nonocculsive)  - CTA neg for PE  - S/p therapeutic Lovenox > now transition to oral since there's no planned surgery  - Eliquis 5 BID    #HTN  - Losartan 25    #Hyperglycemia (on steroid)  #Type 2 Diabetes (A1C 7.1)   OP Dr. Andrea? > Lantus 15 HS, Janumet 50/1000 BID  - Lantus 15 HS while on steroids per endo  - Admelog 7 premeal  - FS + ISS    #Enlarged Left Adnexa  - Outpatient GYN    #Pain Management:  Analgesic: Tylenol PRN  Avoid sedating medications that may interfere with cognitive recovery    #GI/Bowel:  Senna QHS, Miralax BID  GI ppx: Protonix    #/Bladder:   Prior UTI (Ecoli) - s/p CTX x3 days (completed on 11/13)  - PVR q 8 hours (SC if > 400)    #Skin/Pressure Injury:   - Skin assessment on admission: ***  - Skin barrier cream as needed  - Nursing to monitor skin Qshift    #Diet:  Dysphagia: SLP consult for swallow function evaluation and treatment  Current Diet: Regular   [X] Aspiration Precautions  Nutrition consult     #Precautions / PROPHYLAXIS:   - Falls, Seizure   - Lungs: Aspiration, Incentive Spirometer     ---------------  Code Status/Emergency Contact:    Outpatient Follow-up (Specialty/Name of physician):  Yomaira López  Neurology  [address], [address]  [place] [address]  Phone: [phone]  Fax: [phone]  Follow Up Time: 2 weeks    Neville Chaudhary  Neurosurgery  [address] [place] [address]  Phone: [phone]  Fax: [phone]  Follow Up Time: 2 weeks    Hal Carvajal  Vascular Medicine  [address] [place] [address]  Phone: [phone]  Fax: [phone]  Follow Up Time: 2 weeks  --------------  Goals: Safe discharge to Home*****  Estimated Length of Stay: 10-14 days  Rehab Potential: Good  Medical Prognosis: Good  Estimated Disposition: Home with Home Care  ---------------    PRESCREEN COMPARISON:  I have reviewed the prescreen information and I have found no relevant changes between the preadmission screening and my post admission evaluation.    RATIONALE FOR INPATIENT ADMISSION: Patient demonstrates the following:  [X] Medically appropriate for rehabilitation admission  [X] Has attainable rehab goals with an appropriate initial discharge plan  [X]Has rehabilitation potential (expected to make a significant improvement within a reasonable period of time)  [X] Requires close medical management by a rehab physician, rehab nursing care, Hospitalist and comprehensive interdisciplinary team (including PT, OT and/or SLP, Prosthetics and Orthotics)   Assessment/Plan:  HIEU HERRMANN is a 69y with a history of type 2 diabetes and glaucoma, who presented to [facility] on 11/10 after son found her on the ground + presumed seizure-like activity. 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 To be managed on AED + steroid.  Hospital course by right soleal + peroneal DVT.  s/p  therapeutic Lovenox, now transitioned to oral AC.  Now admitted to [facility] after for initiation of a multidisciplinary rehab program consisting focused on functional mobility, transfers and ADLs (activities of daily living).    #B/L frontal Meningioma - complicated by seizure   - Patient deferred surgery; steroid taper for mass effect  - Decadron: 4mg q6 x1D > 3 q6 x 1D > 3 q8 x 1D > 4 BID x 1D > 2 TID x 1D >2 BID x 1D> 1 BID x 1D > 1 QD til outpatient - TAPER ORDERED   - EEG without recorded seizure 11/11-14  - Seizure ppx: Vimpat 100 BID +  Rescue med: 1st line- Lorazepam 1 mg IV push for seizures lasting >3 minutes with vital sign changes. 2nd line- Briviact 100 mg IV push for seizures lasting >3 minutes refractory to lorazepam.  - Will consider neuro guidance on Seizure and steroid taper management  Deficits:   Comprehensive Multidisciplinary Rehab Program: 3 hours a day ( 1 hr PT, 1 hr OT, 1 hr SLP), 5 days a week.    #DVTs: Right soleal occlusive + right peroneal (nonocculsive)  - CTA neg for PE  - S/p therapeutic Lovenox > now transition to oral since there's no planned surgery  - Eliquis 5 BID    #HTN  - Losartan 25    #Hyperglycemia (on steroid)  #Type 2 Diabetes (A1C 7.1)   OP Dr. Andrea? > Lantus 15 HS, Janumet 50/1000 BID  - Lantus 15 HS while on steroids per endo  - Admelog 7 premeal  - FS + ISS    #Enlarged Left Adnexa  - Outpatient GYN    #Pain Management:  Analgesic: Tylenol PRN  Avoid sedating medications that may interfere with cognitive recovery    #GI/Bowel:  Senna QHS, Miralax BID  GI ppx: Protonix    #/Bladder:   Prior UTI (Ecoli) - s/p CTX x3 days (completed on 11/13)  - PVR q 8 hours (SC if > 400)    #Skin/Pressure Injury:   - Skin assessment on admission: all skin intact , generalized bruising   - Skin barrier cream as needed  - Nursing to monitor skin Qshift    #Diet:  Dysphagia: SLP consult for swallow function evaluation and treatment  Current Diet: Regular   [X] Aspiration Precautions  Nutrition consult     #Precautions / PROPHYLAXIS:   - Falls, Seizure   - Lungs: Aspiration, Incentive Spirometer     ---------------  Code Status/Emergency Contact:    Outpatient Follow-up (Specialty/Name of physician):  Yomaira López  Neurology  [address], [address]  [place] [address]  Phone: [phone]  Fax: [phone]  Follow Up Time: 2 weeks    Neville Chaudhary  Neurosurgery  [address] [place] [address]  Phone: [phone]  Fax: [phone]  Follow Up Time: 2 weeks    Hal Carvajal  Vascular Medicine  [address] [place] [address]  Phone: [phone]  Fax: [phone]  Follow Up Time: 2 weeks  --------------  Goals: Safe discharge to Home*****  Estimated Length of Stay: 10-14 days  Rehab Potential: Good  Medical Prognosis: Good  Estimated Disposition: Home with Home Care  ---------------    PRESCREEN COMPARISON:  I have reviewed the prescreen information and I have found no relevant changes between the preadmission screening and my post admission evaluation.    RATIONALE FOR INPATIENT ADMISSION: Patient demonstrates the following:  [X] Medically appropriate for rehabilitation admission  [X] Has attainable rehab goals with an appropriate initial discharge plan  [X]Has rehabilitation potential (expected to make a significant improvement within a reasonable period of time)  [X] Requires close medical management by a rehab physician, rehab nursing care, Hospitalist and comprehensive interdisciplinary team (including PT, OT and/or SLP, Prosthetics and Orthotics)   Assessment/Plan:  HIEU HERRMANN is a 69y with a history of type 2 diabetes and glaucoma, who presented to [facility] on 11/10 after son found her on the ground + presumed seizure-like activity. 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 To be managed on AED + steroid.  Hospital course by right soleal + peroneal DVT.  s/p  therapeutic Lovenox, now transitioned to oral AC.  Now admitted to [facility] after for initiation of a multidisciplinary rehab program consisting focused on functional mobility, transfers and ADLs (activities of daily living).    #B/L frontal Meningioma - complicated by seizure   - Patient deferred surgery; steroid taper for mass effect  - Decadron: 4mg q6 x1D > 3 q6 x 1D > 3 q8 x 1D > 4 BID x 1D > 2 TID x 1D >2 BID x 1D> 1 BID x 1D > 1 QD til outpatient - [facility] team message said to d/c dex taper, taper was only for post op.  Only received 1 day of steroid, no need to taper  - EEG without recorded seizure 11/11-14  - Seizure ppx: Vimpat 100 BID +  Rescue med: 1st line- Lorazepam 1 mg IV push for seizures lasting >3 minutes with vital sign changes. 2nd line- Briviact 100 mg IV push for seizures lasting >3 minutes refractory to lorazepam.  - Will consider neuro guidance on Seizure and steroid taper management  - repeat CTH (last 11/10, MR 11/12)  Deficits: left LE proximal weakness  Comprehensive Multidisciplinary Rehab Program: 3 hours a day ( 1 hr PT, 1 hr OT, 1 hr SLP), 5 days a week.    #DVTs: Right soleal occlusive + right peroneal (nonocculsive)  - CTA neg for PE  - S/p therapeutic Lovenox > now transition to oral since there's no planned surgery  - Eliquis 5 BID    #HTN  - Losartan 25  - BP: 149/70 - 154/72    #Hyperglycemia (on steroid)  #Type 2 Diabetes (A1C 7.1)   OP Dr. Andrea? > Lantus 15 HS, Janumet 50/1000 BID  - Lantus 15 HS while on steroids per endo  - Admelog 7 premeal  - FS + ISS  - -293 (11/19)    #Enlarged Left Adnexa  - Outpatient GYN    #Pain Management:  Analgesic: Tylenol PRN  Avoid sedating medications that may interfere with cognitive recovery    #GI/Bowel:  Senna QHS, Miralax BID  GI ppx: Protonix    #/Bladder:   Prior UTI (Ecoli) - s/p CTX x3 days (completed on 11/13)  - PVR q 8 hours (SC if > 400) - PVR <200;; dc monitoring    #Skin/Pressure Injury:   - Skin assessment on admission: all skin intact , generalized bruising   - Skin barrier cream as needed  - Nursing to monitor skin Qshift    #Diet:  Dysphagia: SLP consult for swallow function evaluation and treatment  Current Diet: Regular   [X] Aspiration Precautions  Nutrition consult     #Precautions / PROPHYLAXIS:   - Falls, Seizure   - Lungs: Aspiration, Incentive Spirometer     ---------------  Code Status/Emergency Contact:    Outpatient Follow-up (Specialty/Name of physician):  Yomaira López  Neurology  [address] [place] [address], [address]  [place] [address]  Phone: [phone]  Fax: [phone]  Follow Up Time: 2 weeks    Neville Chaudhary  Neurosurgery  [address] [place] [address]  Phone: [phone]  Fax: [phone]  Follow Up Time: 2 weeks    Hal Carvajal  Vascular Medicine  [address] [place] [address], [address] [place] [address]  Phone: [phone]  Fax: [phone]  Follow Up Time: 2 weeks  --------------  Goals: Safe discharge to Home*****  Estimated Length of Stay: 10-14 days  Rehab Potential: Good  Medical Prognosis: Good  Estimated Disposition: Home with Home Care  ---------------    PRESCREEN COMPARISON:  I have reviewed the prescreen information and I have found no relevant changes between the preadmission screening and my post admission evaluation.    RATIONALE FOR INPATIENT ADMISSION: Patient demonstrates the following:  [X] Medically appropriate for rehabilitation admission  [X] Has attainable rehab goals with an appropriate initial discharge plan  [X]Has rehabilitation potential (expected to make a significant improvement within a reasonable period of time)  [X] Requires close medical management by a rehab physician, rehab nursing care, Hospitalist and comprehensive interdisciplinary team (including PT, OT and/or SLP, Prosthetics and Orthotics)   Assessment/Plan:  HIEU HERRMANN is a 69y with a history of type 2 diabetes and glaucoma, who presented to [facility] on 11/10 after son found her on the ground + presumed seizure-like activity. 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 To be managed on AED + steroid.  Hospital course by right soleal + peroneal DVT.  s/p  therapeutic Lovenox, now transitioned to oral AC.  Now admitted to [facility] after for initiation of a multidisciplinary rehab program consisting focused on functional mobility, transfers and ADLs (activities of daily living).    #B/L frontal Meningioma - complicated by seizure   - Patient deferred surgery; steroid taper for mass effect  - Decadron: 4mg q6 x1D > 3 q6 x 1D > 3 q8 x 1D > 4 BID x 1D > 2 TID x 1D >2 BID x 1D> 1 BID x 1D > 1 QD til outpatient - [facility] team message said to d/c dex taper, taper was only for post op.  Only received 1 day of steroid, no need to taper  - EEG without recorded seizure 11/11-14  - Seizure ppx: Vimpat 100 BID +  Rescue med: 1st line- Lorazepam 1 mg IV push for seizures lasting >3 minutes with vital sign changes. 2nd line- Briviact 100 mg IV push for seizures lasting >3 minutes refractory to lorazepam.  - Will consider neuro guidance on Seizure and steroid taper management  - last 11/10, MR 11/12 - low threshold to repeat CTH if there's any changes  Deficits: left LE proximal weakness  Comprehensive Multidisciplinary Rehab Program: 3 hours a day ( 1 hr PT, 1 hr OT, 1 hr SLP), 5 days a week.    #DVTs: Right soleal occlusive + right peroneal (nonocculsive)  - CTA neg for PE  - S/p therapeutic Lovenox > now transition to oral since there's no planned surgery  - Eliquis 5 BID    #HTN  - Losartan 25  - BP: 149/70 - 154/72    #Hyperglycemia (on steroid)  #Type 2 Diabetes (A1C 7.1)   OP Dr. Andrea? > Lantus 15 HS, Janumet 50/1000 BID  - Lantus 15 HS while on steroids per endo  - Admelog 7 premeal  - FS + ISS  - -293 (11/19)    #Enlarged Left Adnexa  - Outpatient GYN    #Pain Management:  Analgesic: Tylenol PRN  Avoid sedating medications that may interfere with cognitive recovery    #GI/Bowel:  Senna QHS, Miralax BID  GI ppx: Protonix    #/Bladder:   Prior UTI (Ecoli) - s/p CTX x3 days (completed on 11/13)  - PVR q 8 hours (SC if > 400) - PVR <200;; dc monitoring    #Skin/Pressure Injury:   - Skin assessment on admission: all skin intact , generalized bruising   - Skin barrier cream as needed  - Nursing to monitor skin Qshift    #Diet:  Dysphagia: SLP consult for swallow function evaluation and treatment  Current Diet: Regular   [X] Aspiration Precautions  Nutrition consult     #Precautions / PROPHYLAXIS:   - Falls, Seizure   - Lungs: Aspiration, Incentive Spirometer     ---------------  Code Status/Emergency Contact:    Outpatient Follow-up (Specialty/Name of physician):  Yomaira López  Neurology  [address] [place] [address], [address]  [place] [address]  Phone: [phone]  Fax: [phone]  Follow Up Time: 2 weeks    Neville Chaudhary  Neurosurgery  [address] [place] [address]  Phone: [phone]  Fax: [phone]  Follow Up Time: 2 weeks    Hal Carvajal  Vascular Medicine  [address] [place] [address]  Phone: [phone]  Fax: [phone]  Follow Up Time: 2 weeks  --------------  Goals: Safe discharge to Home*****  Estimated Length of Stay: 10-14 days  Rehab Potential: Good  Medical Prognosis: Good  Estimated Disposition: Home with Home Care  ---------------    PRESCREEN COMPARISON:  I have reviewed the prescreen information and I have found no relevant changes between the preadmission screening and my post admission evaluation.    RATIONALE FOR INPATIENT ADMISSION: Patient demonstrates the following:  [X] Medically appropriate for rehabilitation admission  [X] Has attainable rehab goals with an appropriate initial discharge plan  [X]Has rehabilitation potential (expected to make a significant improvement within a reasonable period of time)  [X] Requires close medical management by a rehab physician, rehab nursing care, Hospitalist and comprehensive interdisciplinary team (including PT, OT and/or SLP, Prosthetics and Orthotics)   Assessment/Plan:  HIEU HERRMANN is a 69y with a history of type 2 diabetes and glaucoma, who presented to [facility] on 11/10 after son found her on the ground + presumed seizure-like activity. 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 To be managed on AED + steroid.  Hospital course by right soleal + peroneal DVT.  s/p  therapeutic Lovenox, now transitioned to oral AC.  Now admitted to [facility] after for initiation of a multidisciplinary rehab program consisting focused on functional mobility, transfers and ADLs (activities of daily living).    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 No parenchymal signal abnormality of the adjacent frontal lobes. Mild ventriculomegaly secondary findings raising possibility of normal pressure hydrocephalus.  - Patient deferred surgery; steroid taper for mass effect  - Decadron: 4mg q6 x1D > 3 q6 x 1D > 3 q8 x 1D > 4 BID x 1D > 2 TID x 1D >2 BID x 1D> 1 BID x 1D > 1 QD til outpatient - [facility] team message said to d/c dex taper, taper was only for post op.  Only received 1 day of steroid, no need to taper  - EEG without recorded seizure 11/11-14  - Seizure ppx: Vimpat 100 BID +  Rescue med: 1st line- Lorazepam 1 mg IV push for seizures lasting >3 minutes with vital sign changes. 2nd line- Briviact 100 mg IV push for seizures lasting >3 minutes refractory to lorazepam.  - Will consider neuro guidance on Seizure and steroid taper management  - last 11/10, MR 11/12 - low threshold to repeat CTH if there's any changes  Deficits: left LE proximal weakness  Comprehensive Multidisciplinary Rehab Program: 3 hours a day ( 1 hr PT, 1 hr OT, 1 hr SLP), 5 days a week.    #DVTs: Right soleal occlusive + right peroneal (nonocculsive)  - CTA neg for PE  - S/p therapeutic Lovenox > now transition to oral since there's no planned surgery  - Eliquis 5 BID    #HTN  - Losartan 25  - BP: 149/70 - 154/72    #Hyperglycemia (on steroid)  #Type 2 Diabetes (A1C 7.1)   OP Dr. Andrea? > Lantus 15 HS, Janumet 50/1000 BID  - Lantus 15 HS while on steroids per endo  - Admelog 7 premeal  - FS + ISS  - -293 (11/19)    #Enlarged Left Adnexa  - Outpatient GYN    #Pain Management:  Analgesic: Tylenol PRN  Avoid sedating medications that may interfere with cognitive recovery    #GI/Bowel:  Senna QHS, Miralax BID  GI ppx: Protonix    #/Bladder:   Prior UTI (Ecoli) - s/p CTX x3 days (completed on 11/13)  - PVR q 8 hours (SC if > 400) - PVR <200; dc monitoring    #Skin/Pressure Injury:   - Skin assessment on admission: all skin intact , generalized bruising   - Skin barrier cream as needed  - Nursing to monitor skin Qshift    #Diet:  Dysphagia: SLP consult for swallow function evaluation and treatment  Current Diet: Regular   [X] Aspiration Precautions  Nutrition consult     #Precautions / PROPHYLAXIS:   - Falls, Seizure   - Lungs: Aspiration, Incentive Spirometer     ---------------  Code Status/Emergency Contact:    Outpatient Follow-up (Specialty/Name of physician):  Yomaira López  Neurology  [address], [address]  [place] [address]  Phone: [phone]  Fax: [phone]  Follow Up Time: 2 weeks    Neville Chaudhary  Neurosurgery  [address] [place] [address]  Phone: [phone]  Fax: [phone]  Follow Up Time: 2 weeks    Hal Carvajal  Vascular Medicine  [address] [place] [id number]  Phone: [phone]  Fax: [phone]  Follow Up Time: 2 weeks  --------------  Goals: Safe discharge to Home*****  Estimated Length of Stay: 10-14 days  Rehab Potential: Good  Medical Prognosis: Good  Estimated Disposition: Home with Home Care  ---------------    PRESCREEN COMPARISON:  I have reviewed the prescreen information and I have found no relevant changes between the preadmission screening and my post admission evaluation.    RATIONALE FOR INPATIENT ADMISSION: Patient demonstrates the following:  [X] Medically appropriate for rehabilitation admission  [X] Has attainable rehab goals with an appropriate initial discharge plan  [X]Has rehabilitation potential (expected to make a significant improvement within a reasonable period of time)  [X] Requires close medical management by a rehab physician, rehab nursing care, Hospitalist and comprehensive interdisciplinary team (including PT, OT and/or SLP, Prosthetics and Orthotics)

## 2024-11-18 NOTE — PROGRESS NOTE ADULT - SUBJECTIVE AND OBJECTIVE BOX
Patient seen and examined at bedside.    --Anticoagulation--  enoxaparin Injectable 70 milliGRAM(s) SubCutaneous every 12 hours    T(C): 36.8 (11-18-24 @ 05:00), Max: 37.1 (11-17-24 @ 23:58)  HR: 53 (11-18-24 @ 05:00) (53 - 70)  BP: 158/79 (11-18-24 @ 05:00) (122/64 - 170/72)  RR: 18 (11-18-24 @ 05:00) (16 - 18)  SpO2: 99% (11-18-24 @ 05:00) (98% - 100%)  Wt(kg): --    Exam:  alert, Ox3, PERRL, FC, VARGHESE spontaneous AG

## 2024-11-18 NOTE — H&P ADULT - NSICDXPASTMEDICALHX_GEN_ALL_CORE_FT
PAST MEDICAL HISTORY:  Diabetes     Diabetes mellitus     Glaucoma     Glaucoma      PAST MEDICAL HISTORY:  Diabetes mellitus     Glaucoma

## 2024-11-18 NOTE — H&P ADULT - NS ATTEND AMEND GEN_ALL_CORE FT
69y Female with a history of type 2 diabetes and glaucoma, who presented to [facility] on 11/10 after son found her on the ground + presumed seizure-like activity. 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 To be managed on AED + steroid.  Hospital course by right soleal + peroneal DVT.  s/p  therapeutic Lovenox, now transitioned to oral AC.  Now admitted to [facility] after for initiation of a multidisciplinary rehab program consisting focused on functional mobility, transfers and ADLs (activities of daily living).    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 No parenchymal signal abnormality of the adjacent frontal lobes. Mild ventriculomegaly secondary findings raising possibility of normal pressure hydrocephalus.    Patient seen and examined at bedside, AA0X4, denies headaches or pain. States she did not want surgery to remove meningioma, understands she has to be on seizure meds.      Physical Exam  Gen - NAD, Comfortable  HEENT - NCAT, EOMI, PERRLA - L reactive, but less reactive compared to R, Normal Conjunctivae  Neck - Supple, No limited ROM  Pulm - CTAB, No wheeze, No rhonchi, No crackles  Cardiovascular - RRR, S1S2, No murmurs  Chest - good chest expansion, good respiratory effort  Abdomen - Soft, NT/ND, +BS  Extremities - No cyanosis or pedal edema, left without calf tenderness (right not tested d/t DVT)  Neuro-     Cognitive - awake, alert, oriented x 4.  Able  to follow command + answer questions appropriately     Communication - Fluent, Comprehensible, No dysarthria, No aphasia, able to repeat; identify 2 obj/func 2/2     Attention: Intact, able to state days of week chronologically + backwards. Able to perform simple additions and subtractions     Memory: Recall 3 objects immediate and 3 min later     Cranial Nerves -hearing intact. No facial asymmetry. Tongue midline + good lateral movement (no thrush), EOMI, Shoulder shrug intact     Motor -                     LEFT    UE - ShAB 5/5, EF 5/5, EE 5/5,  5/5                    RIGHT UE - ShAB 5/5, EF 5/5, EE 5/5,   5/5                    LEFT    LE - HF 4/5, KE 4/5, DF 5/5, PF 5/5                    RIGHT LE - HF 5/5, KE 5/5, DF 5/5, PF 5/5        Sensory - Intact to LT      Coordination - FTN/HTS intact      Tone - Normal  Psychiatric - Mood stable, Affect WNL  Skin:  all skin intact , generalized bruising    reviewed admission labs  reviewed goals of acute care and length of stay  per NSGY teams message -- no need for steroids as patient did not have surgery.  Total time 80 mins-face to face time encounter and counseling the patient, meeting with hospitalist, nursing staff, therapists and social work to discuss medical updates and management, care coordination, reviewing chart and data-including but not limited to labs and imaging

## 2024-11-18 NOTE — PROGRESS NOTE ADULT - SUBJECTIVE AND OBJECTIVE BOX
Vascular Cardiology Progress Note     DIRECT PROVIDER NUMBER: [phone]  / Available on TEAMS    CC: Seizures      Interval Events:  No C/P or SOB.  No LE pain or edema.   Last LE venous duplex showed:  There is persistent occlusive thrombus in the R soleal vein of the calf.   There is new nonocclusive thrombus in the R peroneal vein in the calf.    MEDICATIONS  (STANDING):  apixaban 2.5 milliGRAM(s) Oral every 12 hours  dexAMETHasone     Tablet 4 milliGRAM(s) Oral every 6 hours  dextrose 5%. 1000 milliLiter(s) (50 mL/Hr) IV Continuous <Continuous>  dextrose 5%. 1000 milliLiter(s) (100 mL/Hr) IV Continuous <Continuous>  dextrose 50% Injectable 25 Gram(s) IV Push once  dextrose 50% Injectable 12.5 Gram(s) IV Push once  dextrose 50% Injectable 25 Gram(s) IV Push once  glucagon  Injectable 1 milliGRAM(s) IntraMuscular once  insulin glargine Injectable (LANTUS) 13 Unit(s) SubCutaneous at bedtime  insulin lispro (ADMELOG) corrective regimen sliding scale   SubCutaneous three times a day before meals  insulin lispro (ADMELOG) corrective regimen sliding scale   SubCutaneous at bedtime  insulin lispro Injectable (ADMELOG) 4 Unit(s) SubCutaneous three times a day before meals  lacosamide 100 milliGRAM(s) Oral <User Schedule>  pantoprazole  Injectable 40 milliGRAM(s) IV Push daily  polyethylene glycol 3350 17 Gram(s) Oral two times a day  senna 2 Tablet(s) Oral at bedtime    PAST MEDICAL & SURGICAL HISTORY:  Diabetes mellitus  Glaucoma    FAMILY HISTORY: see HPI    SOCIAL HISTORY:  unchanged    REVIEW OF SYSTEMS:  CONSTITUTIONAL: No fever  EYES: No eye pain  ENT:  No throat pain  NECK: No pain   RESPIRATORY: No SOB   CARDIOVASCULAR:  No C/P  GASTROINTESTINAL: No abdominal pain  GENITOURINARY: No hematuria  SKIN: No LE wounds  LYMPH Nodes: No enlarged glands noted  ENDOCRINE: No heat or cold intolerance noted  MUSCULOSKELETAL: No LE edema  PSYCHIATRIC: No depression, anxiety  HEME/LYMPH: No bleeding gums  ALLERGY AND IMMUNOLOGIC: No hives    [ x] All others negative	    PHYSICAL EXAM:  Vital Signs Last 24 Hrs  T(C): 36.8 (18 Nov 2024 08:00), Max: 37.1 (17 Nov 2024 23:58)  T(F): 98.2 (18 Nov 2024 08:00), Max: 98.8 (17 Nov 2024 23:58)  HR: 57 (18 Nov 2024 08:00) (53 - 64)  BP: 156/81 (18 Nov 2024 08:00) (122/64 - 168/74)  BP(mean): --  RR: 18 (18 Nov 2024 08:00) (18 - 18)  SpO2: 98% (18 Nov 2024 08:00) (98% - 100%)    Parameters below as of 18 Nov 2024 05:00  Patient On (Oxygen Delivery Method): room air      Appearance: NAD	  Cardiovascular: RRR, S1 and S2  Respiratory: CTA B/L  Gastrointestinal:  Soft, Non-tender, + BS	  Skin: No cyanosis	  Extremities: No LE edema, bilateral calves soft     LABS:	 	                       11.3   5.79  )-----------( 305      ( 17 Nov 2024 07:44 )             36.8     11-18    138  |  105  |  15  ----------------------------<  324[H]  4.3   |  22  |  0.65    Ca    9.4      18 Nov 2024 11:48  Phos  2.5     11-17  Mg     2.0     11-17    TPro  7.1  /  Alb  3.4  /  TBili  0.4  /  DBili  x   /  AST  57[H]  /  ALT  71[H]  /  AlkPhos  68  11-18    Urinalysis Basic - ( 18 Nov 2024 11:48 )    Color: x / Appearance: x / SG: x / pH: x  Gluc: 324 mg/dL / Ketone: x  / Bili: x / Urobili: x   Blood: x / Protein: x / Nitrite: x   Leuk Esterase: x / RBC: x / WBC x   Sq Epi: x / Non Sq Epi: x / Bacteria: x        Assessment:  1. R Below the Knee DVT  2. Seizures  3. Enlarged left adnexa  4. Cerebral lesion    Plan:  1. Patient transitioned today to Eliquis 2.5 mg BID.   2. Last LE venous duplex showed:  There is persistent occlusive thrombus in the R soleal vein of the calf.   There is new nonocclusive thrombus in the R peroneal vein in the calf.  3. CTA chest negative for PE.  4. Will discuss with neurology and neurosurgery that if there are no procedures planned and no DDI with anti-epilepsy medications, recommend to increase Eliquis to 5 mg BID.  5. TTE showed normal RV function.  6. Patient hemodynamically stable on RA. No cardiopulmonary symptoms.  7. Future GYN follow-up for asymmetrically enlarged left adnexa.  8. Appreciate excellent Neurology & Neurosurgical care.     Thank you  IRWIN Saldaña, MS, [facility]  Vascular Cardiology Service    Please call with any questions:   DIRECT SERVICE NUMBER: [phone] / Available on TEAMS

## 2024-11-18 NOTE — PROGRESS NOTE ADULT - ASSESSMENT
-Has persistent DVT, holding Eliquis pending operative plan  -multiple episodes of hyperglycemia, endocrine following  -recommend dexamethasone for mass effect/edema, insulin regimen per endo while on steroids  -Pt not interested in surgery at this time           -Has persistent DVT, holding Eliquis pending operative plan  -multiple episodes of hyperglycemia, endocrine following  -recommend dexamethasone for mass effect/edema, starting with Dex 4q6h with taper as follows:  3q6h x 1 day  3q8h x 1 day  4 BID x 1 day  2 BID x 1 day  1 BID  x 1 day  -can stay on dexamethasone 1mg daily until seen in clinic  -insulin regimen per endo while on steroids  -Pt not interested in surgery at this time

## 2024-11-18 NOTE — PROGRESS NOTE ADULT - NS ATTEND AMEND GEN_ALL_CORE FT
Agree with neurology housestaff and/or PA/NP exam, assessment, and plan unless otherwise stated. Patient evaluated and examined in my presence, case discussed with treatment team. As per protocol, I discussed available results of testing and plan of care with the patient/advocate, who agree to the plan, including anticipated benefits of the admission, studies, procedure. Note above reviewed, further edited for accuracy, and cosigned.  Face time for evaluation, education, and counseling was >50% of time spent on unit (minutes) x: 35     symptomatic seizures in the setting of left frontal tumor, likely meningioma with some frontal edema   neurosurgery has consulted   postictal/preictal encephalopathy improving   some gait abnormalities related to arthritis, and recent seizures   DVT now on anticoagulation   acute rehab is recommended   continue anticonvulsant medication recommended with lacosamide 100 mg twice daily

## 2024-11-18 NOTE — DISCHARGE NOTE NURSING/CASE MANAGEMENT/SOCIAL WORK - FINANCIAL ASSISTANCE
[facility] provides services at a reduced cost to those who are determined to be eligible through [facility]’s financial assistance program. Information regarding [facility]’s financial assistance program can be found by going to [email or web address] or by calling [phone].

## 2024-11-18 NOTE — PROGRESS NOTE ADULT - PROBLEM SELECTOR PLAN 3
LDL goal <70 due to DM  Pt LDL NA  Order fasting lipid if not recently done  Consider  moderate intensity statin if not contraindicated  Manage per primary team   F/u levels as out pt

## 2024-11-18 NOTE — H&P ADULT - NSHPSOCIALHISTORY_GEN_ALL_CORE
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

## 2024-11-18 NOTE — DISCHARGE NOTE NURSING/CASE MANAGEMENT/SOCIAL WORK - NSDCPEFALRISK_GEN_ALL_CORE
For information on Fall & Injury Prevention, visit: [email or web address] OR  [email or web address] OR  https://www.cdc.gov/steadi/patient.html

## 2024-11-18 NOTE — PROGRESS NOTE ADULT - PROBLEM SELECTOR PLAN 1
- Test BG ac and hs OR q6h if NPO  - Increase Lantus to 15 units QHS while on steroids   - Increase Admelog to 7 units TID before meals (HOLD if NPO or not eating)  - Continue with moderate Admelog correction scales ac and hs   DISCHARGE PLANNING:  Discharge on same insulin regimen as noted above. Will likely need insulin dose adjustments to present insulin regimen while on steroids  If off steroids after rehab can restart home regimen of Lantus 15 units plus Janumet 50/1000mg bid  Might need endo f/u if pt remains on steroid requiring insulin therapy. If off steroids can f/u with pcp. Can follow at endo practice. [address]. Phone . If needed  Needs Optho follow up

## 2024-11-18 NOTE — H&P ADULT - NSHPLABSRESULTS_GEN_ALL_CORE
11.3   5.79  )-----------( 305      ( 17 Nov 2024 07:44 )             36.8     11-18    138  |  105  |  15  ----------------------------<  324[H]  4.3   |  22  |  0.65    Ca    9.4      18 Nov 2024 11:48  Phos  2.5     11-17  Mg     2.0     11-17    TPro  7.1  /  Alb  3.4  /  TBili  0.4  /  DBili  x   /  AST  57[H]  /  ALT  71[H]  /  AlkPhos  68  11-18    RADIOLOGY, EKG & ADDITIONAL TESTS:   VA Duplex Lower Ext Vein Scan, Bilat (11.15.24 @ 11:15) >  Persistent right lower extremity below the knee DVT, with new finding of   nonocclusive thrombus in the peroneal vein. No evidence of clot   propagation above the knee    US Doppler Pelvis (11.13.24 @ 15:59) >  4.6 cm indeterminate heterogeneous solid mass in the region of the left   ovary/adnexa and adjacent to the uterus of uncertain origin. Differential   diagnosis includes solid ovarian neoplasm versus serosal fibroid.     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 No parenchymal signal abnormality   of the adjacent frontal lobes.  -Mild ventriculomegaly secondary findings raising possibility of normal   pressure hydrocephalus.    CT Abdomen and Pelvis w/ IV Cont (11.11.24 @ 14:04) >  Asymmetrically enlarged left adnexa. Recommend pelvic sonogram or MRI for   further evaluation.  No acute intrathoracic findings.    Xray Chest 1 View- (11.10.24 @ 15:01) >  ET tube 3.5 cm above the van.  Right basilar linear atelectasis.

## 2024-11-18 NOTE — PROGRESS NOTE ADULT - PROBLEM SELECTOR PLAN 2
As above plus probable need to adjust doses as steroid dose is coming down  Going to rehab  On Dex 4q6h with taper as follows: started today   3q6h x 1 day  3q8h x 1 day  4 BID x 1 day  2 BID x 1 day  1 BID  x 1 day

## 2024-11-18 NOTE — PROGRESS NOTE ADULT - NSPROGADDITIONALINFOA_GEN_ALL_CORE
-Plan discussed with pt/team.  Contact info: [phone] (24/7). pager [phone]  Amion on [place]  Teams on M-T-W-F. Unavailable Thu/Weekends/Holidays  Assessed pt/labs/meds and discussed plan of care with primary team  Adjusting insulin  Discharge plan  Follow up care

## 2024-11-18 NOTE — PROGRESS NOTE ADULT - ASSESSMENT
69y F w/h/o controlled T2DM (A1C 7.1%) on Lantus 15 units plus Janumet. No known DM complications but has h/o glaucoma. No HTN or HLD. Here from [facility] after having a seizure and noted to have a meningioma. Pt declined surgery and will be discharged to rehab on steroid taper due to mass effect/edema. Endocrinology consulted for hyperglycemia. Pt remains hyperglycemic since meal time insulin was not given this am  and was just started at lunch time today. Expecting BG to go up now that pt started on Dexa taper. Spoke to neuro team about discharge recs since pt is going to rehab this pm. Adjusting all insulin doses iso steroid therapy to keep BG levels 100 to 180s. No hypoglycemia. Pt will likely need insulin dose adjustments while in rehab and on steroid taper     Dex 4q6h with taper as follows: started today   3q6h x 1 day  3q8h x 1 day  4 BID x 1 day  2 BID x 1 day  1 BID  x 1 day        Home regimen: Lantus 15 units qhs, Janumet 50/1000 BID

## 2024-11-18 NOTE — PROGRESS NOTE ADULT - SUBJECTIVE AND OBJECTIVE BOX
DIABETES FOLLOW UP NOTE: Saw pt earlier today    Chief Complaint: Endocrine consult requested for management of DM    INTERVAL HX: Pt stable, reports feeling well and tolerating POs with BG levels variable between 100s to 200s in the last 24 hours. Noted pt was NPO this am but order was discontinued> pt had late breakfast but didn't get meal time insulin doses with rebound hyperglycemia at lunch time. Pt states, she was tentative for surgery but she doesn't want the surgery. Per primary team, pt is going  to rehab today. Noted order for Dexa 4mg q6h and pt is been discharged on a taper per neurosurgery note> received 1st dose today.        Review of Systems:  General: As above  Cardiovascular: No chest pain, palpitations  Respiratory: No SOB, no cough  GI: No nausea, vomiting, abdominal pain  Endocrine: No polyuria, polydipsia or S&Sx of hypoglycemia    Allergies    Allergy Status Unknown    Intolerances      MEDICATIONS:  dexAMETHasone     Tablet 4 milliGRAM(s) Oral every 6 hours  insulin glargine Injectable (LANTUS) 13 Unit(s) SubCutaneous at bedtime  insulin lispro (ADMELOG) corrective regimen sliding scale   SubCutaneous three times a day before meals  insulin lispro (ADMELOG) corrective regimen sliding scale   SubCutaneous at bedtime  insulin lispro Injectable (ADMELOG) 4 Unit(s) SubCutaneous three times a day before meals      PHYSICAL EXAM:  VITALS: T(C): 36.6 (11-18-24 @ 13:00)  T(F): 97.9 (11-18-24 @ 13:00), Max: 98.8 (11-17-24 @ 23:58)  HR: 74 (11-18-24 @ 14:28) (53 - 74)  BP: 150/65 (11-18-24 @ 14:28) (122/64 - 168/74)  RR:  (18 - 18)  SpO2:  (97% - 100%)  Wt(kg): --  GENERAL: Female laying in bed in NAD  Abdomen: Soft, nontender, non distended  Extremities: Warm, no edema in all 4 exts  NEURO: Alert and bale to answer all questions.     LABS:  POCT Blood Glucose.: 259 mg/dL (11-18-24 @ 11:21)  POCT Blood Glucose.: 142 mg/dL (11-18-24 @ 07:40)  POCT Blood Glucose.: 203 mg/dL (11-17-24 @ 21:15)  POCT Blood Glucose.: 258 mg/dL (11-17-24 @ 16:11)  POCT Blood Glucose.: 295 mg/dL (11-17-24 @ 11:04)  POCT Blood Glucose.: 116 mg/dL (11-17-24 @ 07:30)  POCT Blood Glucose.: 470 mg/dL (11-16-24 @ 20:52)  POCT Blood Glucose.: 451 mg/dL (11-16-24 @ 20:51)  POCT Blood Glucose.: 182 mg/dL (11-16-24 @ 16:05)  POCT Blood Glucose.: 331 mg/dL (11-16-24 @ 11:14)  POCT Blood Glucose.: 226 mg/dL (11-16-24 @ 08:21)  POCT Blood Glucose.: 306 mg/dL (11-15-24 @ 21:13)  POCT Blood Glucose.: 280 mg/dL (11-15-24 @ 16:05)                            11.3   5.79  )-----------( 305      ( 17 Nov 2024 07:44 )             36.8       11-18    138  |  105  |  15  ----------------------------<  324[H]  4.3   |  22  |  0.65    eGFR: 95    Ca    9.4      11-18  Mg     2.0     11-17  Phos  2.5     11-17    TPro  7.1  /  Alb  3.4  /  TBili  0.4  /  DBili  x   /  AST  57[H]  /  ALT  71[H]  /  AlkPhos  68  11-18    Thyroid Function Tests:  11-11 @ 11:25 TSH 0.29 FreeT4 -- T3 -- Anti TPO -- Anti Thyroglobulin Ab -- TSI --      A1C with Estimated Average Glucose Result: 7.1 % (11-10-24 @ 15:29)      Estimated Average Glucose: 157 mg/dL (11-10-24 @ 15:29)

## 2024-11-18 NOTE — H&P ADULT - HISTORY OF PRESENT ILLNESS
70yo F with PMH type 2 diabetes and glaucoma, who presented to [facility] on 11/10 after son found her on the ground + presumed seizure-like activity. Patient received a total of 15 mg of Versed during transport from first ED, Patient was intubated in ED, started on propofol and fentanyl infusions; received a total of 2500 of IV Keppra.  CTH showed 1 cm partially calcified mass along the anterior skull base (may represent meningioma) + mild mass effect on the inferior medial left frontal lobe.  No acute ICH or midline shift. Patient transferred (same day - 11/10) to [facility] for further interventions.    Patient was loaded with Vimpat 400mg.  VEEG 11/11-11/14: no seizures were recorded. 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  Hospital course complicated by right soleal + peroneal DVT. Started on therapeutic Lovenox > transition to oral AC. CT CAP shows a left adnexal mass, which was also present in US pelvis. Patient can follow up with gynecologist outpatient.    Patient was evaluated by PM&R and therapy for functional deficits and gait/ADL impairments and recommend acute rehabilitation.  Patient was cleared for discharge to [place] on 11/18

## 2024-11-19 LAB
ALBUMIN SERPL ELPH-MCNC: 2.9 G/DL — LOW (ref 3.3–5)
ALP SERPL-CCNC: 73 U/L — SIGNIFICANT CHANGE UP (ref 40–120)
ALT FLD-CCNC: 85 U/L — HIGH (ref 10–45)
ANION GAP SERPL CALC-SCNC: 8 MMOL/L — SIGNIFICANT CHANGE UP (ref 5–17)
AST SERPL-CCNC: 52 U/L — HIGH (ref 10–40)
BASOPHILS # BLD AUTO: 0.04 K/UL — SIGNIFICANT CHANGE UP (ref 0–0.2)
BASOPHILS NFR BLD AUTO: 0.6 % — SIGNIFICANT CHANGE UP (ref 0–2)
BILIRUB SERPL-MCNC: 0.5 MG/DL — SIGNIFICANT CHANGE UP (ref 0.2–1.2)
BUN SERPL-MCNC: 13 MG/DL — SIGNIFICANT CHANGE UP (ref 7–23)
CALCIUM SERPL-MCNC: 9 MG/DL — SIGNIFICANT CHANGE UP (ref 8.4–10.5)
CHLORIDE SERPL-SCNC: 105 MMOL/L — SIGNIFICANT CHANGE UP (ref 96–108)
CO2 SERPL-SCNC: 27 MMOL/L — SIGNIFICANT CHANGE UP (ref 22–31)
CREAT SERPL-MCNC: 0.83 MG/DL — SIGNIFICANT CHANGE UP (ref 0.5–1.3)
EGFR: 76 ML/MIN/1.73M2 — SIGNIFICANT CHANGE UP
EOSINOPHIL # BLD AUTO: 0 K/UL — SIGNIFICANT CHANGE UP (ref 0–0.5)
EOSINOPHIL NFR BLD AUTO: 0 % — SIGNIFICANT CHANGE UP (ref 0–6)
GLUCOSE BLDC GLUCOMTR-MCNC: 279 MG/DL — HIGH (ref 70–99)
GLUCOSE BLDC GLUCOMTR-MCNC: 293 MG/DL — HIGH (ref 70–99)
GLUCOSE BLDC GLUCOMTR-MCNC: 317 MG/DL — HIGH (ref 70–99)
GLUCOSE BLDC GLUCOMTR-MCNC: 353 MG/DL — HIGH (ref 70–99)
GLUCOSE BLDC GLUCOMTR-MCNC: 364 MG/DL — HIGH (ref 70–99)
GLUCOSE SERPL-MCNC: 283 MG/DL — HIGH (ref 70–99)
HCT VFR BLD CALC: 34.9 % — SIGNIFICANT CHANGE UP (ref 34.5–45)
HGB BLD-MCNC: 11.2 G/DL — LOW (ref 11.5–15.5)
IMM GRANULOCYTES NFR BLD AUTO: 0.6 % — SIGNIFICANT CHANGE UP (ref 0–0.9)
LYMPHOCYTES # BLD AUTO: 1.32 K/UL — SIGNIFICANT CHANGE UP (ref 1–3.3)
LYMPHOCYTES # BLD AUTO: 18.2 % — SIGNIFICANT CHANGE UP (ref 13–44)
MCHC RBC-ENTMCNC: 23.7 PG — LOW (ref 27–34)
MCHC RBC-ENTMCNC: 32.1 G/DL — SIGNIFICANT CHANGE UP (ref 32–36)
MCV RBC AUTO: 73.9 FL — LOW (ref 80–100)
MONOCYTES # BLD AUTO: 0.33 K/UL — SIGNIFICANT CHANGE UP (ref 0–0.9)
MONOCYTES NFR BLD AUTO: 4.5 % — SIGNIFICANT CHANGE UP (ref 2–14)
NEUTROPHILS # BLD AUTO: 5.54 K/UL — SIGNIFICANT CHANGE UP (ref 1.8–7.4)
NEUTROPHILS NFR BLD AUTO: 76.1 % — SIGNIFICANT CHANGE UP (ref 43–77)
NRBC # BLD: 0 /100 WBCS — SIGNIFICANT CHANGE UP (ref 0–0)
PLATELET # BLD AUTO: 333 K/UL — SIGNIFICANT CHANGE UP (ref 150–400)
POTASSIUM SERPL-MCNC: 3.8 MMOL/L — SIGNIFICANT CHANGE UP (ref 3.5–5.3)
POTASSIUM SERPL-SCNC: 3.8 MMOL/L — SIGNIFICANT CHANGE UP (ref 3.5–5.3)
PROT SERPL-MCNC: 7 G/DL — SIGNIFICANT CHANGE UP (ref 6–8.3)
RBC # BLD: 4.72 M/UL — SIGNIFICANT CHANGE UP (ref 3.8–5.2)
RBC # FLD: 13.8 % — SIGNIFICANT CHANGE UP (ref 10.3–14.5)
SODIUM SERPL-SCNC: 140 MMOL/L — SIGNIFICANT CHANGE UP (ref 135–145)
WBC # BLD: 7.27 K/UL — SIGNIFICANT CHANGE UP (ref 3.8–10.5)
WBC # FLD AUTO: 7.27 K/UL — SIGNIFICANT CHANGE UP (ref 3.8–10.5)

## 2024-11-19 PROCEDURE — 99223 1ST HOSP IP/OBS HIGH 75: CPT

## 2024-11-19 RX ORDER — INSULIN GLARGINE 100 [IU]/ML
15 INJECTION, SOLUTION SUBCUTANEOUS ONCE
Refills: 0 | Status: COMPLETED | OUTPATIENT
Start: 2024-11-19 | End: 2024-11-19

## 2024-11-19 RX ORDER — INFLUENZA VIRUS VACCINE 15; 15; 15; 15 UG/.5ML; UG/.5ML; UG/.5ML; UG/.5ML
0.5 SUSPENSION INTRAMUSCULAR ONCE
Refills: 0 | Status: DISCONTINUED | OUTPATIENT
Start: 2024-11-19 | End: 2024-11-27

## 2024-11-19 RX ADMIN — APIXABAN 5 MILLIGRAM(S): 2.5 TABLET, FILM COATED ORAL at 05:24

## 2024-11-19 RX ADMIN — Medication 7 UNIT(S): at 16:58

## 2024-11-19 RX ADMIN — DEXAMETHASONE 4 MILLIGRAM(S): 1.5 TABLET ORAL at 00:00

## 2024-11-19 RX ADMIN — Medication 7 UNIT(S): at 12:39

## 2024-11-19 RX ADMIN — INSULIN GLARGINE 15 UNIT(S): 100 INJECTION, SOLUTION SUBCUTANEOUS at 21:34

## 2024-11-19 RX ADMIN — INSULIN GLARGINE 15 UNIT(S): 100 INJECTION, SOLUTION SUBCUTANEOUS at 00:30

## 2024-11-19 RX ADMIN — APIXABAN 5 MILLIGRAM(S): 2.5 TABLET, FILM COATED ORAL at 18:13

## 2024-11-19 RX ADMIN — DEXAMETHASONE 4 MILLIGRAM(S): 1.5 TABLET ORAL at 05:24

## 2024-11-19 RX ADMIN — Medication 7 UNIT(S): at 09:26

## 2024-11-19 RX ADMIN — LACOSAMIDE 100 MILLIGRAM(S): 10 INJECTION INTRAVENOUS at 05:27

## 2024-11-19 RX ADMIN — PANTOPRAZOLE SODIUM 40 MILLIGRAM(S): 40 TABLET, DELAYED RELEASE ORAL at 05:28

## 2024-11-19 RX ADMIN — Medication 6: at 21:34

## 2024-11-19 RX ADMIN — LOSARTAN POTASSIUM 25 MILLIGRAM(S): 100 TABLET, FILM COATED ORAL at 05:24

## 2024-11-19 RX ADMIN — LACOSAMIDE 100 MILLIGRAM(S): 10 INJECTION INTRAVENOUS at 18:13

## 2024-11-19 RX ADMIN — POLYETHYLENE GLYCOL 3350 17 GRAM(S): 17 POWDER, FOR SOLUTION ORAL at 05:24

## 2024-11-19 RX ADMIN — POLYETHYLENE GLYCOL 3350 17 GRAM(S): 17 POWDER, FOR SOLUTION ORAL at 18:13

## 2024-11-19 NOTE — DIETITIAN INITIAL EVALUATION ADULT - OTHER INFO
69y with a history of type 2 diabetes and glaucoma, who presented to [facility] on 11/10 after son found her on the ground + presumed seizure-like activity. 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 To be managed on AED + steroid.  Hospital course by right soleal + peroneal DVT.  s/p  therapeutic Lovenox, now transitioned to oral AC.  Now admitted to [facility] after for initiation of a multidisciplinary rehab program consisting focused on functional mobility, transfers and ADLs (activities of daily living).    Pt tolerating consistent carbohydrate (no snacks) diet with good PO intake per pt. Denies any change in appetite/PO intake during hospitalization. Currently, pt noted with elevated POCT glucose (likely due to steroids). Pt's HbA1c of 7.1% indicates fair glycemic control for age. Discussed consistent carb diet + glycemic management. Denies nausea, vomiting, diarrhea, constipation. Pt denies chewing/swallowing difficulties. Reports UBW ~170's. Current weight is 181.8 lbs. No edema per nursing flow sheets.

## 2024-11-19 NOTE — DIETITIAN INITIAL EVALUATION ADULT - PERTINENT LABORATORY DATA
11-19    140  |  105  |  13  ----------------------------<  283[H]  3.8   |  27  |  0.83    Ca    9.0      19 Nov 2024 05:48    TPro  7.0  /  Alb  2.9[L]  /  TBili  0.5  /  DBili  x   /  AST  52[H]  /  ALT  85[H]  /  AlkPhos  73  11-19  POCT Blood Glucose.: 364 mg/dL (11-19-24 @ 11:55)  A1C with Estimated Average Glucose Result: 7.1 % (11-10-24 @ 15:29)

## 2024-11-19 NOTE — PATIENT PROFILE ADULT - VISION (WITH CORRECTIVE LENSES IF THE PATIENT USUALLY WEARS THEM):
Is This A New Presentation, Or A Follow-Up?: Growth Additional History: From skin cancer screening. Needs box. Just had baby 10 days ago and is breast feeding. Requests no epi to be used. Normal vision: sees adequately in most situations; can see medication labels, newsprint

## 2024-11-19 NOTE — DIETITIAN INITIAL EVALUATION ADULT - PERTINENT MEDS FT
MEDICATIONS  (STANDING):  apixaban 5 milliGRAM(s) Oral two times a day  glucagon  Injectable 1 milliGRAM(s) IntraMuscular once  glucagon  Injectable 1 milliGRAM(s) IntraMuscular once  influenza  Vaccine (HIGH DOSE) 0.5 milliLiter(s) IntraMuscular once  insulin glargine Injectable (LANTUS) 15 Unit(s) SubCutaneous at bedtime  insulin lispro (ADMELOG) corrective regimen sliding scale   SubCutaneous at bedtime  insulin lispro (ADMELOG) corrective regimen sliding scale   SubCutaneous at bedtime  insulin lispro Injectable (ADMELOG) 7 Unit(s) SubCutaneous three times a day before meals  lacosamide 100 milliGRAM(s) Oral <User Schedule>  losartan 25 milliGRAM(s) Oral daily  pantoprazole    Tablet 40 milliGRAM(s) Oral before breakfast  polyethylene glycol 3350 17 Gram(s) Oral two times a day  senna 2 Tablet(s) Oral at bedtime    MEDICATIONS  (PRN):  acetaminophen     Tablet .. 650 milliGRAM(s) Oral every 6 hours PRN Mild Pain (1 - 3)  LORazepam   Injectable 1 milliGRAM(s) IV Push once PRN seizure

## 2024-11-19 NOTE — CONSULT NOTE ADULT - ASSESSMENT
70 yo F with PMH of Type 2 Diabetes Mellitus, Glaucoma who was found on the ground, found to have meningioma. Intubated/extubated. Surgery deferred, managed by AED and steroids. Found to have R soleal vein DVT, on AC. Now admitted to [facility] for rehab.     Bilateral frontal meningioma   Seizure due to above  - steroid taper per nsgy  - continue vimpat 100mg bid. seizure precautions   - outpatient nsgy follow up   - PT/OT per PMR    DVT   - R soleal occlusive + R peroneal   - Eliquis 5mg BID    Hypertension   - continue losartan     Type 2 Diabetes Mellitus   - HbA1C 7.1%  - monitor sugar trends closely while on steroids  - Lantus 15u qhs, lispro 7u TID AC  - FS and ILIANA    Left adnexal mass   - outpatient GYN follow up     DVT Prophylaxis:  - Eliquis    Thank you for involving us in the care of this patient. Medicine service will follow.

## 2024-11-19 NOTE — PATIENT PROFILE ADULT - FALL HARM RISK - HARM RISK INTERVENTIONS

## 2024-11-19 NOTE — CONSULT NOTE ADULT - SUBJECTIVE AND OBJECTIVE BOX
Patient is a 69y old  Female who presents with a chief complaint of Meningioma (18 Nov 2024 15:33)    HPI, per admission H&P:  70yo F with PMH type 2 diabetes and glaucoma, who presented to [facility] on 11/10 after son found her on the ground + presumed seizure-like activity. Patient received a total of 15 mg of Versed during transport from first ED, Patient was intubated in ED, started on propofol and fentanyl infusions; received a total of 2500 of IV Keppra.  CTH showed 1 cm partially calcified mass along the anterior skull base (may represent meningioma) + mild mass effect on the inferior medial left frontal lobe.  No acute ICH or midline shift. Patient transferred (same day - 11/10) to [facility] for further interventions.    Patient was loaded with Vimpat 400mg.  VEEG 11/11-11/14: no seizures were recorded. 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  Hospital course complicated by right soleal + peroneal DVT. Started on therapeutic Lovenox > transition to oral AC. CT CAP shows a left adnexal mass, which was also present in US pelvis. Patient can follow up with gynecologist outpatient.    Patient was evaluated by PM&R and therapy for functional deficits and gait/ADL impairments and recommend acute rehabilitation.  Patient was cleared for discharge to [facility] on 11/18 (18 Nov 2024 15:33)    Subjective   Patient seen and examined at bedside. Reviewed medical history and recent hospitalization with patient. Denies pain/discomfort. Lives with son. States she has been diabetic for more than 10 years, takes insulin and pills.   Denies headache, nausea, visual changes.     PAST MEDICAL & SURGICAL HISTORY:  Glaucoma  Diabetes mellitus    No significant past surgical history    SOCIAL HISTORY: Denies smoking, alcohol or drug use. Lives with son.   FAMILY HISTORY: noncontributory     ALLERGIES:  No Known Allergies  Allergy Status Unknown    MEDICATIONS  (STANDING):  apixaban 5 milliGRAM(s) Oral two times a day  dexAMETHasone     Tablet   Oral   dexAMETHasone     Tablet 4 milliGRAM(s) Oral every 6 hours  glucagon  Injectable 1 milliGRAM(s) IntraMuscular once  glucagon  Injectable 1 milliGRAM(s) IntraMuscular once  influenza  Vaccine (HIGH DOSE) 0.5 milliLiter(s) IntraMuscular once  insulin glargine Injectable (LANTUS) 15 Unit(s) SubCutaneous at bedtime  insulin lispro (ADMELOG) corrective regimen sliding scale   SubCutaneous at bedtime  insulin lispro (ADMELOG) corrective regimen sliding scale   SubCutaneous at bedtime  insulin lispro Injectable (ADMELOG) 7 Unit(s) SubCutaneous three times a day before meals  lacosamide 100 milliGRAM(s) Oral <User Schedule>  losartan 25 milliGRAM(s) Oral daily  pantoprazole    Tablet 40 milliGRAM(s) Oral before breakfast  polyethylene glycol 3350 17 Gram(s) Oral two times a day  senna 2 Tablet(s) Oral at bedtime    MEDICATIONS  (PRN):  acetaminophen     Tablet .. 650 milliGRAM(s) Oral every 6 hours PRN Mild Pain (1 - 3)  LORazepam   Injectable 1 milliGRAM(s) IV Push once PRN seizure    Review of Systems: Refer to HPI for pertinent positives and negatives. All other ROS reviewed and negative except as otherwise stated above.    Vital Signs Last 24 Hrs  T(F): 97.9 (19 Nov 2024 05:18), Max: 99.1 (18 Nov 2024 17:01)  HR: 73 (19 Nov 2024 05:18) (57 - 74)  BP: 151/65 (19 Nov 2024 05:18) (149/70 - 156/81)  RR: 16 (19 Nov 2024 05:18) (16 - 18)  SpO2: 100% (19 Nov 2024 05:18) (96% - 100%)  I&O's Summary    PHYSICAL EXAM:  GENERAL: NAD, well-groomed  HEAD:  Atraumatic, Normocephalic  EYES: EOMI, PERRL, conjunctiva and sclera clear  ENMT: Moist mucous membranes, Good dentition  NECK: Supple, No JVD  CHEST/LUNG: Clear to auscultation bilaterally, non-labored breathing, good air entry  HEART: RRR; S1/S2, No murmur  ABDOMEN: Soft, Nontender, Nondistended; Bowel sounds present  VASCULAR: Normal pulses, Normal capillary refill  EXTREMITIES: No cyanosis, No edema  LYMPH: No lymphadenopathy noted  SKIN: Warm, Intact  PSYCH: Normal mood and affect  NERVOUS SYSTEM:  A/O x3, Good concentration; CN 2-12 intact, No focal deficits, moves all extremities    LABS:                        11.2   7.27  )-----------( 333      ( 19 Nov 2024 05:48 )             34.9     11-18    138  |  105  |  15  ----------------------------<  324  4.3   |  22  |  0.65    Ca    9.4      18 Nov 2024 11:48  Phos  2.5     11-17  Mg     2.0     11-17    TPro  7.1  /  Alb  3.4  /  TBili  0.4  /  DBili  x   /  AST  57  /  ALT  71  /  AlkPhos  68  11-18    POCT Blood Glucose.: 293 mg/dL (19 Nov 2024 00:11)  POCT Blood Glucose.: 275 mg/dL (18 Nov 2024 22:47)  POCT Blood Glucose.: 221 mg/dL (18 Nov 2024 16:33)  POCT Blood Glucose.: 259 mg/dL (18 Nov 2024 11:21)  POCT Blood Glucose.: 142 mg/dL (18 Nov 2024 07:40)    Urinalysis Basic - ( 18 Nov 2024 11:48 )    Color: x / Appearance: x / SG: x / pH: x  Gluc: 324 mg/dL / Ketone: x  / Bili: x / Urobili: x   Blood: x / Protein: x / Nitrite: x   Leuk Esterase: x / RBC: x / WBC x   Sq Epi: x / Non Sq Epi: x / Bacteria: x    COVID-19 PCR: NotDetec (11-18-24 @ 22:30)    RADIOLOGY & ADDITIONAL TESTS:    11/10 EKG (personally reviewed by me): Sinus rhythm with sinus arrythmia at 88bpm    Care Discussed with Consultants/Other Providers:

## 2024-11-20 ENCOUNTER — NON-APPOINTMENT (OUTPATIENT)
Age: 69
End: 2024-11-20

## 2024-11-20 PROBLEM — Z00.00 ENCOUNTER FOR PREVENTIVE HEALTH EXAMINATION: Status: ACTIVE | Noted: 2024-11-20

## 2024-11-20 LAB
GLUCOSE BLDC GLUCOMTR-MCNC: 181 MG/DL — HIGH (ref 70–99)
GLUCOSE BLDC GLUCOMTR-MCNC: 216 MG/DL — HIGH (ref 70–99)
GLUCOSE BLDC GLUCOMTR-MCNC: 248 MG/DL — HIGH (ref 70–99)
GLUCOSE BLDC GLUCOMTR-MCNC: 339 MG/DL — HIGH (ref 70–99)

## 2024-11-20 PROCEDURE — 99232 SBSQ HOSP IP/OBS MODERATE 35: CPT

## 2024-11-20 PROCEDURE — 99233 SBSQ HOSP IP/OBS HIGH 50: CPT | Mod: GC

## 2024-11-20 RX ADMIN — ACETAMINOPHEN 500MG 650 MILLIGRAM(S): 500 TABLET, COATED ORAL at 08:52

## 2024-11-20 RX ADMIN — Medication 7 UNIT(S): at 08:05

## 2024-11-20 RX ADMIN — Medication 7 UNIT(S): at 12:01

## 2024-11-20 RX ADMIN — POLYETHYLENE GLYCOL 3350 17 GRAM(S): 17 POWDER, FOR SOLUTION ORAL at 05:05

## 2024-11-20 RX ADMIN — LACOSAMIDE 100 MILLIGRAM(S): 10 INJECTION INTRAVENOUS at 17:01

## 2024-11-20 RX ADMIN — PANTOPRAZOLE SODIUM 40 MILLIGRAM(S): 40 TABLET, DELAYED RELEASE ORAL at 05:05

## 2024-11-20 RX ADMIN — INSULIN GLARGINE 15 UNIT(S): 100 INJECTION, SOLUTION SUBCUTANEOUS at 22:05

## 2024-11-20 RX ADMIN — APIXABAN 5 MILLIGRAM(S): 2.5 TABLET, FILM COATED ORAL at 05:06

## 2024-11-20 RX ADMIN — ACETAMINOPHEN 500MG 650 MILLIGRAM(S): 500 TABLET, COATED ORAL at 08:05

## 2024-11-20 RX ADMIN — LOSARTAN POTASSIUM 25 MILLIGRAM(S): 100 TABLET, FILM COATED ORAL at 05:05

## 2024-11-20 RX ADMIN — APIXABAN 5 MILLIGRAM(S): 2.5 TABLET, FILM COATED ORAL at 17:01

## 2024-11-20 RX ADMIN — Medication 7 UNIT(S): at 17:01

## 2024-11-20 RX ADMIN — LACOSAMIDE 100 MILLIGRAM(S): 10 INJECTION INTRAVENOUS at 05:05

## 2024-11-20 NOTE — PROGRESS NOTE ADULT - ASSESSMENT
68 yo F with PMH of Type 2 Diabetes Mellitus, Glaucoma who was found on the ground, found to have meningioma. Intubated/extubated. Surgery deferred, managed by AED and steroids. Found to have R soleal vein DVT, on AC. Now admitted to [facility] for rehab.     Bilateral frontal meningioma   Seizure due to above  - steroid taper per nsgy  - continue vimpat 100mg bid. seizure precautions   - outpatient nsgy follow up   - PT/OT per PMR    DVT   - R soleal occlusive + R peroneal   - Eliquis 5mg BID    Hypertension   - continue losartan     Type 2 Diabetes Mellitus   - HbA1C 7.1%  - hyperglycemia likely due to steroids   - Lantus 15u qhs, lispro 7u TID AC  - FS and ILIANA    Left adnexal mass   - outpatient GYN follow up     DVT Prophylaxis:  - Eliquis

## 2024-11-21 LAB
GLUCOSE BLDC GLUCOMTR-MCNC: 178 MG/DL — HIGH (ref 70–99)
GLUCOSE BLDC GLUCOMTR-MCNC: 239 MG/DL — HIGH (ref 70–99)
GLUCOSE BLDC GLUCOMTR-MCNC: 274 MG/DL — HIGH (ref 70–99)
GLUCOSE BLDC GLUCOMTR-MCNC: 276 MG/DL — HIGH (ref 70–99)

## 2024-11-21 PROCEDURE — 99232 SBSQ HOSP IP/OBS MODERATE 35: CPT

## 2024-11-21 RX ORDER — INSULIN GLARGINE 100 [IU]/ML
19 INJECTION, SOLUTION SUBCUTANEOUS AT BEDTIME
Refills: 0 | Status: DISCONTINUED | OUTPATIENT
Start: 2024-11-21 | End: 2024-11-24

## 2024-11-21 RX ORDER — APIXABAN 2.5 MG/1
1 TABLET, FILM COATED ORAL
Qty: 60 | Refills: 0
Start: 2024-11-21 | End: 2024-12-20

## 2024-11-21 RX ORDER — LACOSAMIDE 10 MG/ML
1 INJECTION INTRAVENOUS
Qty: 60 | Refills: 0
Start: 2024-11-21 | End: 2024-12-20

## 2024-11-21 RX ADMIN — Medication 7 UNIT(S): at 07:46

## 2024-11-21 RX ADMIN — PANTOPRAZOLE SODIUM 40 MILLIGRAM(S): 40 TABLET, DELAYED RELEASE ORAL at 05:51

## 2024-11-21 RX ADMIN — INSULIN GLARGINE 19 UNIT(S): 100 INJECTION, SOLUTION SUBCUTANEOUS at 22:05

## 2024-11-21 RX ADMIN — APIXABAN 5 MILLIGRAM(S): 2.5 TABLET, FILM COATED ORAL at 05:50

## 2024-11-21 RX ADMIN — LACOSAMIDE 100 MILLIGRAM(S): 10 INJECTION INTRAVENOUS at 17:26

## 2024-11-21 RX ADMIN — LOSARTAN POTASSIUM 25 MILLIGRAM(S): 100 TABLET, FILM COATED ORAL at 05:50

## 2024-11-21 RX ADMIN — Medication 7 UNIT(S): at 17:26

## 2024-11-21 RX ADMIN — APIXABAN 5 MILLIGRAM(S): 2.5 TABLET, FILM COATED ORAL at 17:26

## 2024-11-21 RX ADMIN — LACOSAMIDE 100 MILLIGRAM(S): 10 INJECTION INTRAVENOUS at 05:50

## 2024-11-21 RX ADMIN — Medication 7 UNIT(S): at 11:36

## 2024-11-21 RX ADMIN — Medication 500 MILLIGRAM(S): at 17:26

## 2024-11-21 NOTE — PROGRESS NOTE ADULT - ASSESSMENT
70 yo F with PMH of Type 2 Diabetes Mellitus, Glaucoma who was found on the ground, found to have meningioma. Intubated/extubated. Surgery deferred, managed by AED and steroids. Found to have R soleal vein DVT, on AC. Now admitted to [facility] for rehab.     Uncontrolled, Type 2 Diabetes Mellitus   - HbA1C 7.1%  - hyperglycemia likely due to steroids   - Lantus increased to 15 -->19u qhs 11/21/24, lispro 7u TID AC  - follow POCT  - FS and ILIANA    Bilateral frontal meningioma   Seizure due to above  - steroid taper per nsgy  - continue vimpat 100mg bid. seizure precautions   - outpatient nsgy follow up   - PT/OT per PMR    DVT   - R soleal occlusive + R peroneal   - Eliquis 5mg BID    Hypertension   - continue losartan     Left adnexal mass   - outpatient GYN follow up     DVT Prophylaxis:  - Eliquis

## 2024-11-21 NOTE — PROGRESS NOTE ADULT - ASSESSMENT
69y PMH type 2 diabetes and glaucoma, who presented to [facility] on 11/10/24 after son found her on the ground + presumed seizure-like activity, found to have 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 Hospital course by right soleal + peroneal DVT.    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 Mild ventriculomegaly secondary findings raising possibility of normal pressure hydrocephalus.  - Patient deferred surgery; steroid taper for mass effect  - Comprehensive Multidisciplinary Rehab Program: 3 hours a day ( 1 hr PT, 1 hr OT, 1 hr SLP), 5 days a week.  - TDD: 11/27 Home    # Seizure  - EEG without recorded seizure 11/11-14  - Seizure ppx: Vimpat 100 BID  - Rescue med: 1st line- Lorazepam 1 mg IV push for seizures lasting >3 minutes with vital sign changes. 2nd line- Briviact 100 mg IV push for seizures lasting >3 minutes refractory to lorazepam.    # HTN  - Losartan 25  - BP:  (110/62 - 134/70) 11/21    # Hyperglycemia from steroid (now off)  # Type 2 Diabetes (A1C 7.1)   - Lantus 15 HS while on steroids per endo  - Admelog 7 premeal  - FS + ISS  - FS still not well-controlled 200-300s, hospitalist f/u  - OP Dr. Andrea? > Lantus 15 HS, Janumet 50/1000 BID    # DVTs: Right soleal occlusive + right peroneal nonocclusive  - CTA neg for PE  - S/p therapeutic Lovenox > now transition to oral since there's no planned surgery  - Eliquis 5 BID  - repeat doppler BLE next week    # Enlarged Left Adnexa  - Outpatient GYN    # Pain Management:  - Tylenol PRN    # GI/Bowel:  - Senna QHS, Miralax BID  - GI ppx: Protonix    # Diet:  - Regular    ---------------  Code Status/Emergency Contact:    Outpatient Follow-up (Specialty/Name of physician):  Yomaira López  Neurology  [address] [place] [address], [address]  [place] [address]  Phone: [phone]  Fax: [phone]  Follow Up Time: 2 weeks    Neville Chaudhary  Neurosurgery  [address] [place] [address]  Phone: [phone]  Fax: [phone]  Follow Up Time: 2 weeks    Hal Carvajal  Vascular Medicine  [address] [place] [address]  Phone: [phone]  Fax: [phone]  Follow Up Time: 2 weeks

## 2024-11-22 LAB
GLUCOSE BLDC GLUCOMTR-MCNC: 167 MG/DL — HIGH (ref 70–99)
GLUCOSE BLDC GLUCOMTR-MCNC: 187 MG/DL — HIGH (ref 70–99)
GLUCOSE BLDC GLUCOMTR-MCNC: 248 MG/DL — HIGH (ref 70–99)
GLUCOSE BLDC GLUCOMTR-MCNC: 69 MG/DL — LOW (ref 70–99)
GLUCOSE BLDC GLUCOMTR-MCNC: 74 MG/DL — SIGNIFICANT CHANGE UP (ref 70–99)

## 2024-11-22 PROCEDURE — 99232 SBSQ HOSP IP/OBS MODERATE 35: CPT

## 2024-11-22 PROCEDURE — 99232 SBSQ HOSP IP/OBS MODERATE 35: CPT | Mod: GC

## 2024-11-22 PROCEDURE — 93970 EXTREMITY STUDY: CPT | Mod: 26

## 2024-11-22 RX ADMIN — APIXABAN 5 MILLIGRAM(S): 2.5 TABLET, FILM COATED ORAL at 05:36

## 2024-11-22 RX ADMIN — Medication 500 MILLIGRAM(S): at 17:26

## 2024-11-22 RX ADMIN — LACOSAMIDE 100 MILLIGRAM(S): 10 INJECTION INTRAVENOUS at 17:26

## 2024-11-22 RX ADMIN — LACOSAMIDE 100 MILLIGRAM(S): 10 INJECTION INTRAVENOUS at 05:36

## 2024-11-22 RX ADMIN — Medication 7 UNIT(S): at 11:41

## 2024-11-22 RX ADMIN — Medication 7 UNIT(S): at 08:05

## 2024-11-22 RX ADMIN — PANTOPRAZOLE SODIUM 40 MILLIGRAM(S): 40 TABLET, DELAYED RELEASE ORAL at 05:37

## 2024-11-22 RX ADMIN — LOSARTAN POTASSIUM 25 MILLIGRAM(S): 100 TABLET, FILM COATED ORAL at 05:36

## 2024-11-22 RX ADMIN — APIXABAN 5 MILLIGRAM(S): 2.5 TABLET, FILM COATED ORAL at 17:26

## 2024-11-22 RX ADMIN — INSULIN GLARGINE 19 UNIT(S): 100 INJECTION, SOLUTION SUBCUTANEOUS at 21:36

## 2024-11-22 RX ADMIN — Medication 500 MILLIGRAM(S): at 08:05

## 2024-11-22 NOTE — PROGRESS NOTE ADULT - ASSESSMENT
68 yo F with PMH of Type 2 Diabetes Mellitus, Glaucoma who was found on the ground, found to have meningioma.   Previously Intubated/extubated. Surgery deferred, managed by AED and steroids.   Found to have R soleal vein DVT, on AC. Now admitted to [facility] for rehab.     Uncontrolled, Type 2 Diabetes Mellitus   - HbA1C 7.1%  - hyperglycemia likely due to steroids   - Lantus increased to 15 -->19u qhs 11/21/24, lispro 7u TID AC, better  - continue to follow POCT  - FS and ILIANA    Bilateral frontal meningioma   Seizure due to above  - steroid taper per nsgy  - continue vimpat 100mg bid. seizure precautions   - outpatient nsgy follow up   - PT/OT per PMR    DVT   - R soleal occlusive + R peroneal   - Eliquis 5mg BID    Hypertension   - continue losartan     Left adnexal mass   - outpatient GYN follow up     DVT Prophylaxis:  - Eliquis

## 2024-11-22 NOTE — PROGRESS NOTE ADULT - ASSESSMENT
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 Hospital course by right soleal + peroneal DVT.    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 Mild ventriculomegaly secondary findings raising possibility of normal pressure hydrocephalus.  - Patient deferred surgery; steroid taper for mass effect  - Comprehensive Multidisciplinary Rehab Program: 3 hours a day ( 1 hr PT, 1 hr OT, 1 hr SLP), 5 days a week.  - TDD: 11/27 Home    # Seizure  - EEG without recorded seizure 11/11-14  - Seizure ppx: Vimpat 100 BID  - Rescue med: 1st line- Lorazepam 1 mg IV push for seizures lasting >3 minutes with vital sign changes. 2nd line- Briviact 100 mg IV push for seizures lasting >3 minutes refractory to lorazepam.    # HTN  - Losartan 25  - BP:  (110/62 - 134/70) 11/21    # Hyperglycemia from steroid (now off)  # Type 2 Diabetes (A1C 7.1)   - Lantus 19 HS   - Admelog 7 premeal +- FS ISS  - FS: 178 - 276 (11/22)  - Retarted Metformin 500 BID (11/22) > titrate up as tolerated  - OP Dr. Andrea? > Lantus 15 HS, Janumet 50/1000 BID    # DVTs: Right soleal occlusive + right peroneal nonocclusive  - CTA neg for PE  - S/p therapeutic Lovenox > now transition to oral since there's no planned surgery  - Eliquis 5 BID  - repeat doppler BLE (11/22)    # Enlarged Left Adnexa  - Outpatient GYN    # Pain Management:  - Tylenol PRN    # GI/Bowel:  - Senna QHS, Miralax BID  - GI ppx: Protonix    # Diet:  - Regular    ---------------  Code Status/Emergency Contact:    Outpatient Follow-up (Specialty/Name of physician):  Yomaira López  Neurology  [address] [place] [address], [address]  [place] [address]  Phone: [phone]  Fax: [phone]  Follow Up Time: 2 weeks    Neville Chaudhary  Neurosurgery  [address] [place] [address]  Phone: [phone]  Fax: [phone]  Follow Up Time: 2 weeks    Hal Carvajal  Vascular Medicine  [address] [facility], [address] [place] [address]  Phone: [phone]  Fax: [phone]  Follow Up Time: 2 weeks       69y PMH type 2 diabetes and glaucoma, who presented to [facility] on 11/10/24 after son found her on the ground + presumed seizure-like activity, found to have 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 Hospital course by right soleal + peroneal DVT.    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 Mild ventriculomegaly secondary findings raising possibility of normal pressure hydrocephalus.  - Patient deferred surgery; steroid taper for mass effect  - Comprehensive Multidisciplinary Rehab Program: 3 hours a day ( 1 hr PT, 1 hr OT, 1 hr SLP), 5 days a week.  - TDD: 11/27 Home    # Seizure  - EEG without recorded seizure 11/11-14  - Seizure ppx: Vimpat 100 BID  - Rescue med: 1st line- Lorazepam 1 mg IV push for seizures lasting >3 minutes with vital sign changes. 2nd line- Briviact 100 mg IV push for seizures lasting >3 minutes refractory to lorazepam.    # HTN  - Losartan 25  - BP:  (110/62 - 134/70) 11/21, monitor     # Hyperglycemia from steroid (now off)  # Type 2 Diabetes (A1C 7.1)   - Lantus 19 HS   - Admelog 7 premeal +- FS ISS  - FS: 178 - 276 (11/22)  - Retarted Metformin 500 BID (11/22) > titrate up as tolerated  - OP Dr. Andrea? > Lantus 15 HS, Janumet 50/1000 BID    # DVTs: Right soleal occlusive + right peroneal nonocclusive  - CTA neg for PE  - S/p therapeutic Lovenox > now transition to oral since there's no planned surgery  - Eliquis 5 BID  - repeat doppler BLE (11/22)    # Enlarged Left Adnexa  - Outpatient GYN    # Pain Management:  - Tylenol PRN    # GI/Bowel:  - Senna QHS, Miralax BID  - GI ppx: Protonix    # Diet:  - Regular    ---------------  Code Status/Emergency Contact:    Outpatient Follow-up (Specialty/Name of physician):  Yomaira óLpez  Neurology  [address] [place] [address], [address]  [place] [address]  Phone: [phone]  Fax: [phone]  Follow Up Time: 2 weeks    Neville Chaudhary  Neurosurgery  [address] [place] [address]  Phone: [phone]  Fax: [phone]  Follow Up Time: 2 weeks    Hal Carvajal  Vascular Medicine  [address] [address]  Phone: [phone]  Fax: [phone]  Follow Up Time: 2 weeks

## 2024-11-23 LAB
GLUCOSE BLDC GLUCOMTR-MCNC: 109 MG/DL — HIGH (ref 70–99)
GLUCOSE BLDC GLUCOMTR-MCNC: 120 MG/DL — HIGH (ref 70–99)
GLUCOSE BLDC GLUCOMTR-MCNC: 209 MG/DL — HIGH (ref 70–99)
GLUCOSE BLDC GLUCOMTR-MCNC: 249 MG/DL — HIGH (ref 70–99)

## 2024-11-23 PROCEDURE — 99232 SBSQ HOSP IP/OBS MODERATE 35: CPT

## 2024-11-23 RX ADMIN — Medication 500 MILLIGRAM(S): at 08:24

## 2024-11-23 RX ADMIN — INSULIN GLARGINE 19 UNIT(S): 100 INJECTION, SOLUTION SUBCUTANEOUS at 21:30

## 2024-11-23 RX ADMIN — LOSARTAN POTASSIUM 25 MILLIGRAM(S): 100 TABLET, FILM COATED ORAL at 05:36

## 2024-11-23 RX ADMIN — Medication 500 MILLIGRAM(S): at 17:43

## 2024-11-23 RX ADMIN — LACOSAMIDE 100 MILLIGRAM(S): 10 INJECTION INTRAVENOUS at 05:35

## 2024-11-23 RX ADMIN — APIXABAN 5 MILLIGRAM(S): 2.5 TABLET, FILM COATED ORAL at 05:36

## 2024-11-23 RX ADMIN — Medication 7 UNIT(S): at 08:53

## 2024-11-23 RX ADMIN — APIXABAN 5 MILLIGRAM(S): 2.5 TABLET, FILM COATED ORAL at 17:44

## 2024-11-23 RX ADMIN — PANTOPRAZOLE SODIUM 40 MILLIGRAM(S): 40 TABLET, DELAYED RELEASE ORAL at 05:36

## 2024-11-23 RX ADMIN — LACOSAMIDE 100 MILLIGRAM(S): 10 INJECTION INTRAVENOUS at 17:43

## 2024-11-23 RX ADMIN — Medication 7 UNIT(S): at 11:57

## 2024-11-23 NOTE — PROGRESS NOTE ADULT - ASSESSMENT
70 yo F with PMH of Type 2 Diabetes Mellitus, Glaucoma who was found on the ground, found to have meningioma.   Previously Intubated/extubated. Surgery deferred, managed by AED and steroids.   Found to have R soleal vein DVT, on AC. Now admitted to [facility] for acute rehab.     Uncontrolled, Type 2 Diabetes Mellitus   - HbA1C 7.1%  - hyperglycemia likely due to steroids > improving  - Lantus 19u qhs 11/21/24, lispro 7u TID AC  - continue to follow POCT  - May need to decrease the Insulin dose off the steroid therapy     Bilateral frontal meningioma   Seizure due to above  - Finished steroid therapy  - continue Vimpat 100mg bid, Seizure precautions   - outpatient neurosurgery follow up   - PT/OT per PMR    DVT   - R soleal occlusive + R peroneal   - Eliquis 5mg BID    Hypertension   - continue losartan 25 daily     Left adnexal mass   - outpatient GYN follow up     DVT Prophylaxis:  - Eliquis

## 2024-11-24 LAB
GLUCOSE BLDC GLUCOMTR-MCNC: 131 MG/DL — HIGH (ref 70–99)
GLUCOSE BLDC GLUCOMTR-MCNC: 154 MG/DL — HIGH (ref 70–99)
GLUCOSE BLDC GLUCOMTR-MCNC: 168 MG/DL — HIGH (ref 70–99)
GLUCOSE BLDC GLUCOMTR-MCNC: 54 MG/DL — CRITICAL LOW (ref 70–99)
GLUCOSE BLDC GLUCOMTR-MCNC: 59 MG/DL — LOW (ref 70–99)
GLUCOSE BLDC GLUCOMTR-MCNC: 63 MG/DL — LOW (ref 70–99)
GLUCOSE BLDC GLUCOMTR-MCNC: 72 MG/DL — SIGNIFICANT CHANGE UP (ref 70–99)
GLUCOSE BLDC GLUCOMTR-MCNC: 75 MG/DL — SIGNIFICANT CHANGE UP (ref 70–99)
GLUCOSE BLDC GLUCOMTR-MCNC: 80 MG/DL — SIGNIFICANT CHANGE UP (ref 70–99)

## 2024-11-24 PROCEDURE — 99232 SBSQ HOSP IP/OBS MODERATE 35: CPT

## 2024-11-24 RX ORDER — 0.9 % SODIUM CHLORIDE 0.9 %
1000 INTRAVENOUS SOLUTION INTRAVENOUS
Refills: 0 | Status: DISCONTINUED | OUTPATIENT
Start: 2024-11-24 | End: 2024-11-27

## 2024-11-24 RX ORDER — INSULIN GLARGINE 100 [IU]/ML
17 INJECTION, SOLUTION SUBCUTANEOUS AT BEDTIME
Refills: 0 | Status: DISCONTINUED | OUTPATIENT
Start: 2024-11-24 | End: 2024-11-25

## 2024-11-24 RX ADMIN — INSULIN GLARGINE 17 UNIT(S): 100 INJECTION, SOLUTION SUBCUTANEOUS at 22:02

## 2024-11-24 RX ADMIN — Medication 7 UNIT(S): at 08:30

## 2024-11-24 RX ADMIN — Medication 5 UNIT(S): at 16:56

## 2024-11-24 RX ADMIN — PANTOPRAZOLE SODIUM 40 MILLIGRAM(S): 40 TABLET, DELAYED RELEASE ORAL at 05:44

## 2024-11-24 RX ADMIN — Medication 500 MILLIGRAM(S): at 08:30

## 2024-11-24 RX ADMIN — Medication 500 MILLIGRAM(S): at 17:23

## 2024-11-24 RX ADMIN — POLYETHYLENE GLYCOL 3350 17 GRAM(S): 17 POWDER, FOR SOLUTION ORAL at 17:24

## 2024-11-24 RX ADMIN — LACOSAMIDE 100 MILLIGRAM(S): 10 INJECTION INTRAVENOUS at 05:44

## 2024-11-24 RX ADMIN — APIXABAN 5 MILLIGRAM(S): 2.5 TABLET, FILM COATED ORAL at 05:44

## 2024-11-24 RX ADMIN — LACOSAMIDE 100 MILLIGRAM(S): 10 INJECTION INTRAVENOUS at 17:23

## 2024-11-24 RX ADMIN — APIXABAN 5 MILLIGRAM(S): 2.5 TABLET, FILM COATED ORAL at 17:23

## 2024-11-24 NOTE — PROVIDER CONTACT NOTE (HYPOGLYCEMIA EVENT) - NS PROVIDER CONTACT BACKGROUND-HYPO
Age: 69y    Gender: Female    POCT Blood Glucose:  63 mg/dL (11-24-24 @ 11:54)  75 mg/dL (11-24-24 @ 07:59)  249 mg/dL (11-23-24 @ 21:29)  109 mg/dL (11-23-24 @ 16:23)      eMAR:  insulin glargine Injectable (LANTUS)   19 Unit(s) SubCutaneous (11-23-24 @ 21:30)    insulin lispro Injectable (ADMELOG)   7 Unit(s) SubCutaneous (11-24-24 @ 08:30)    metFORMIN   500 milliGRAM(s) Oral (11-24-24 @ 08:30)   500 milliGRAM(s) Oral (11-23-24 @ 17:43)     Normal rate, regular rhythm, normal S1, S2 heart sounds heard.

## 2024-11-24 NOTE — PROGRESS NOTE ADULT - ASSESSMENT
68 yo F with PMH of Type 2 Diabetes Mellitus, Glaucoma who was found on the ground, found to have meningioma.   Previously Intubated/extubated. Surgery deferred, managed by AED and steroids.   Found to have R soleal vein DVT, on AC. Now admitted to [facility] for acute rehab.     Uncontrolled, Type 2 Diabetes Mellitus   - HbA1C 7.1%  - hyperglycemia likely due to steroids, finished steroid therapy, now running low   - Decrease Lantus dose to 17u qhs and, lispro 5u TID AC  - continue to follow POCT closely     Bilateral frontal meningioma   Seizure due to above  - Finished steroid therapy  - continue Vimpat 100mg bid, Seizure precautions   - outpatient neurosurgery follow up   - PT/OT per PMR    DVT   - R soleal occlusive + R peroneal   - Eliquis 5mg BID    Hypertension   - continue losartan 25 daily     Left adnexal mass   - outpatient GYN follow up     DVT Prophylaxis:  - Eliquis    GI ppx:  PPI

## 2024-11-25 LAB
ALBUMIN SERPL ELPH-MCNC: 2.8 G/DL — LOW (ref 3.3–5)
ALP SERPL-CCNC: 68 U/L — SIGNIFICANT CHANGE UP (ref 40–120)
ALT FLD-CCNC: 53 U/L — HIGH (ref 10–45)
ANION GAP SERPL CALC-SCNC: 3 MMOL/L — LOW (ref 5–17)
AST SERPL-CCNC: 16 U/L — SIGNIFICANT CHANGE UP (ref 10–40)
BILIRUB SERPL-MCNC: 0.5 MG/DL — SIGNIFICANT CHANGE UP (ref 0.2–1.2)
BUN SERPL-MCNC: 18 MG/DL — SIGNIFICANT CHANGE UP (ref 7–23)
CALCIUM SERPL-MCNC: 9.2 MG/DL — SIGNIFICANT CHANGE UP (ref 8.4–10.5)
CHLORIDE SERPL-SCNC: 107 MMOL/L — SIGNIFICANT CHANGE UP (ref 96–108)
CO2 SERPL-SCNC: 34 MMOL/L — HIGH (ref 22–31)
CREAT SERPL-MCNC: 0.88 MG/DL — SIGNIFICANT CHANGE UP (ref 0.5–1.3)
EGFR: 71 ML/MIN/1.73M2 — SIGNIFICANT CHANGE UP
GLUCOSE BLDC GLUCOMTR-MCNC: 101 MG/DL — HIGH (ref 70–99)
GLUCOSE BLDC GLUCOMTR-MCNC: 202 MG/DL — HIGH (ref 70–99)
GLUCOSE BLDC GLUCOMTR-MCNC: 245 MG/DL — HIGH (ref 70–99)
GLUCOSE BLDC GLUCOMTR-MCNC: 89 MG/DL — SIGNIFICANT CHANGE UP (ref 70–99)
GLUCOSE SERPL-MCNC: 104 MG/DL — HIGH (ref 70–99)
HCT VFR BLD CALC: 36.8 % — SIGNIFICANT CHANGE UP (ref 34.5–45)
HGB BLD-MCNC: 11.3 G/DL — LOW (ref 11.5–15.5)
MCHC RBC-ENTMCNC: 23.4 PG — LOW (ref 27–34)
MCHC RBC-ENTMCNC: 30.7 G/DL — LOW (ref 32–36)
MCV RBC AUTO: 76.2 FL — LOW (ref 80–100)
NRBC # BLD: 0 /100 WBCS — SIGNIFICANT CHANGE UP (ref 0–0)
PLATELET # BLD AUTO: 343 K/UL — SIGNIFICANT CHANGE UP (ref 150–400)
POTASSIUM SERPL-MCNC: 4.5 MMOL/L — SIGNIFICANT CHANGE UP (ref 3.5–5.3)
POTASSIUM SERPL-SCNC: 4.5 MMOL/L — SIGNIFICANT CHANGE UP (ref 3.5–5.3)
PROT SERPL-MCNC: 6.8 G/DL — SIGNIFICANT CHANGE UP (ref 6–8.3)
RBC # BLD: 4.83 M/UL — SIGNIFICANT CHANGE UP (ref 3.8–5.2)
RBC # FLD: 14 % — SIGNIFICANT CHANGE UP (ref 10.3–14.5)
SODIUM SERPL-SCNC: 144 MMOL/L — SIGNIFICANT CHANGE UP (ref 135–145)
WBC # BLD: 4.19 K/UL — SIGNIFICANT CHANGE UP (ref 3.8–10.5)
WBC # FLD AUTO: 4.19 K/UL — SIGNIFICANT CHANGE UP (ref 3.8–10.5)

## 2024-11-25 PROCEDURE — 99232 SBSQ HOSP IP/OBS MODERATE 35: CPT

## 2024-11-25 PROCEDURE — 99233 SBSQ HOSP IP/OBS HIGH 50: CPT | Mod: GC

## 2024-11-25 RX ORDER — RIVAROXABAN 10 MG/1
1 TABLET, FILM COATED ORAL
Qty: 30 | Refills: 0
Start: 2024-11-25 | End: 2024-12-24

## 2024-11-25 RX ORDER — INSULIN GLARGINE 100 [IU]/ML
15 INJECTION, SOLUTION SUBCUTANEOUS AT BEDTIME
Refills: 0 | Status: DISCONTINUED | OUTPATIENT
Start: 2024-11-25 | End: 2024-11-27

## 2024-11-25 RX ORDER — LACOSAMIDE 10 MG/ML
1 INJECTION INTRAVENOUS
Qty: 60 | Refills: 0
Start: 2024-11-25 | End: 2024-12-24

## 2024-11-25 RX ORDER — APIXABAN 2.5 MG/1
1 TABLET, FILM COATED ORAL
Qty: 60 | Refills: 0
Start: 2024-11-25 | End: 2024-12-24

## 2024-11-25 RX ADMIN — Medication 1000 MILLIGRAM(S): at 16:46

## 2024-11-25 RX ADMIN — APIXABAN 5 MILLIGRAM(S): 2.5 TABLET, FILM COATED ORAL at 06:07

## 2024-11-25 RX ADMIN — POLYETHYLENE GLYCOL 3350 17 GRAM(S): 17 POWDER, FOR SOLUTION ORAL at 06:07

## 2024-11-25 RX ADMIN — APIXABAN 5 MILLIGRAM(S): 2.5 TABLET, FILM COATED ORAL at 18:33

## 2024-11-25 RX ADMIN — Medication 500 MILLIGRAM(S): at 07:50

## 2024-11-25 RX ADMIN — POLYETHYLENE GLYCOL 3350 17 GRAM(S): 17 POWDER, FOR SOLUTION ORAL at 18:33

## 2024-11-25 RX ADMIN — Medication 5 UNIT(S): at 07:51

## 2024-11-25 RX ADMIN — INSULIN GLARGINE 15 UNIT(S): 100 INJECTION, SOLUTION SUBCUTANEOUS at 22:15

## 2024-11-25 RX ADMIN — LOSARTAN POTASSIUM 25 MILLIGRAM(S): 100 TABLET, FILM COATED ORAL at 06:06

## 2024-11-25 RX ADMIN — PANTOPRAZOLE SODIUM 40 MILLIGRAM(S): 40 TABLET, DELAYED RELEASE ORAL at 06:07

## 2024-11-25 RX ADMIN — LACOSAMIDE 100 MILLIGRAM(S): 10 INJECTION INTRAVENOUS at 18:32

## 2024-11-25 RX ADMIN — LACOSAMIDE 100 MILLIGRAM(S): 10 INJECTION INTRAVENOUS at 06:07

## 2024-11-25 NOTE — PROGRESS NOTE ADULT - ASSESSMENT
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 Hospital course by right soleal + peroneal DVT.    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 Mild ventriculomegaly secondary findings raising possibility of normal pressure hydrocephalus.  - Patient deferred surgery; steroid taper for mass effect  - Comprehensive Multidisciplinary Rehab Program: 3 hours a day ( 1 hr PT, 1 hr OT, 1 hr SLP), 5 days a week.  - TDD: 11/27 Home    # Seizure  - EEG without recorded seizure 11/11-14  - Seizure ppx: Vimpat 100 BID  - Rescue med: 1st line- Lorazepam 1 mg IV push for seizures lasting >3 minutes with vital sign changes. 2nd line- Briviact 100 mg IV push for seizures lasting >3 minutes refractory to lorazepam.    # HTN  - Losartan 25  - BP: icuycal617/73, post meds 136/73 (11/25)    # Type 2 Diabetes (A1C 7.1)   - Lantus 17 HS   - Admelog standing premeal - dc   - FS ISS  - FS:  (11/25) - was hypo glycemia over the weekend (FS 52) - insulin adjusted.  89 before lunch today.  Standing admelog dc.  - Metformin 500 BID (11/22) > titrate up as tolerated  - OP Dr. Andrea? > Lantus 15 HS, Janumet 50/1000 BID    # DVTs: Right soleal occlusive + right peroneal nonocclusive  - CTA neg for PE  - S/p therapeutic Lovenox > now transition to oral since there's no planned surgery  - Eliquis 5 BID  - repeat doppler BLE (11/22) > Right peroneal dvt resolved, soleal unchanged    # Enlarged Left Adnexa  - Outpatient GYN    # Pain Management:  - Tylenol PRN    # GI/Bowel:  - Senna QHS, Miralax BID  - GI ppx: Protonix    # Diet:  - Regular    ---------------  Code Status/Emergency Contact:    Outpatient Follow-up (Specialty/Name of physician):  Yomaira López  Neurology  [address] [place] [address], [address]  [place] [address]  Phone: [phone]  Fax: [phone]  Follow Up Time: 2 weeks    Neville Chaudhary  Neurosurgery  [address] [place] [address]  Phone: [phone]  Fax: [phone]  Follow Up Time: 2 weeks    Hal Carvajal  Vascular Medicine  [address] [address]  Phone: [phone]  Fax: [phone]  Follow Up Time: 2 weeks

## 2024-11-25 NOTE — PROGRESS NOTE ADULT - ASSESSMENT
68 yo F with PMH of Type 2 Diabetes Mellitus, Glaucoma who was found on the ground, found to have meningioma.   Previously Intubated/extubated. Surgery deferred, managed by AED and steroids.   Found to have R soleal vein DVT, on AC. Now admitted to [facility] for acute rehab.     Uncontrolled, Type 2 Diabetes Mellitus   - HbA1C 7.1%  - hyperglycemia likely due to steroids, finished steroid therapy, now running low   - Decrease Lantus dose to 15u qhs and, D/C  lispro 5u TID AC. Increase Metformin to 1000 mg bid and continue with ISS. Adjust the dose of lantus as might need to further decrease prior to discharge. At home on Janumet 50/1000 mg bid  - continue to follow POCT closely     Bilateral frontal meningioma   Seizure due to above  - Finished steroid therapy  - continue Vimpat 100mg bid, Seizure precautions   - outpatient neurosurgery follow up   - PT/OT per PMR    DVT   - R soleal occlusive + R peroneal   - Eliquis 5mg BID    Hypertension   - continue losartan 25 daily     Left adnexal mass   - outpatient GYN follow up     DVT Prophylaxis:  - Eliquis    GI ppx:  PPI

## 2024-11-26 ENCOUNTER — TRANSCRIPTION ENCOUNTER (OUTPATIENT)
Age: 69
End: 2024-11-26

## 2024-11-26 LAB
GLUCOSE BLDC GLUCOMTR-MCNC: 103 MG/DL — HIGH (ref 70–99)
GLUCOSE BLDC GLUCOMTR-MCNC: 149 MG/DL — HIGH (ref 70–99)
GLUCOSE BLDC GLUCOMTR-MCNC: 173 MG/DL — HIGH (ref 70–99)
GLUCOSE BLDC GLUCOMTR-MCNC: 197 MG/DL — HIGH (ref 70–99)

## 2024-11-26 PROCEDURE — 99232 SBSQ HOSP IP/OBS MODERATE 35: CPT

## 2024-11-26 PROCEDURE — 99232 SBSQ HOSP IP/OBS MODERATE 35: CPT | Mod: GC

## 2024-11-26 RX ORDER — POLYETHYLENE GLYCOL 3350 17 G/17G
17 POWDER, FOR SOLUTION ORAL DAILY
Refills: 0 | Status: DISCONTINUED | OUTPATIENT
Start: 2024-11-27 | End: 2024-11-27

## 2024-11-26 RX ORDER — SITAGLIPTIN AND METFORMIN HYDROCHLORIDE 1000; 50 MG/1; MG/1
1 TABLET, FILM COATED ORAL
Qty: 60 | Refills: 0
Start: 2024-11-26 | End: 2024-12-25

## 2024-11-26 RX ORDER — INSULIN GLARGINE 100 [IU]/ML
15 INJECTION, SOLUTION SUBCUTANEOUS
Qty: 0 | Refills: 0 | DISCHARGE
Start: 2024-11-26

## 2024-11-26 RX ORDER — PANTOPRAZOLE SODIUM 40 MG/1
1 TABLET, DELAYED RELEASE ORAL
Qty: 30 | Refills: 0
Start: 2024-11-26 | End: 2024-12-25

## 2024-11-26 RX ORDER — LOSARTAN POTASSIUM 100 MG/1
1 TABLET, FILM COATED ORAL
Qty: 30 | Refills: 0
Start: 2024-11-26 | End: 2024-12-25

## 2024-11-26 RX ORDER — ACETAMINOPHEN 500MG 500 MG/1
2 TABLET, COATED ORAL
Qty: 0 | Refills: 0 | DISCHARGE
Start: 2024-11-26

## 2024-11-26 RX ADMIN — Medication 1000 MILLIGRAM(S): at 08:29

## 2024-11-26 RX ADMIN — ACETAMINOPHEN 500MG 650 MILLIGRAM(S): 500 TABLET, COATED ORAL at 08:28

## 2024-11-26 RX ADMIN — LACOSAMIDE 100 MILLIGRAM(S): 10 INJECTION INTRAVENOUS at 17:13

## 2024-11-26 RX ADMIN — APIXABAN 5 MILLIGRAM(S): 2.5 TABLET, FILM COATED ORAL at 17:13

## 2024-11-26 RX ADMIN — ACETAMINOPHEN 500MG 650 MILLIGRAM(S): 500 TABLET, COATED ORAL at 09:28

## 2024-11-26 RX ADMIN — APIXABAN 5 MILLIGRAM(S): 2.5 TABLET, FILM COATED ORAL at 06:12

## 2024-11-26 RX ADMIN — Medication 1000 MILLIGRAM(S): at 17:13

## 2024-11-26 RX ADMIN — LOSARTAN POTASSIUM 25 MILLIGRAM(S): 100 TABLET, FILM COATED ORAL at 06:12

## 2024-11-26 RX ADMIN — PANTOPRAZOLE SODIUM 40 MILLIGRAM(S): 40 TABLET, DELAYED RELEASE ORAL at 06:12

## 2024-11-26 RX ADMIN — INSULIN GLARGINE 15 UNIT(S): 100 INJECTION, SOLUTION SUBCUTANEOUS at 21:41

## 2024-11-26 RX ADMIN — POLYETHYLENE GLYCOL 3350 17 GRAM(S): 17 POWDER, FOR SOLUTION ORAL at 06:12

## 2024-11-26 RX ADMIN — LACOSAMIDE 100 MILLIGRAM(S): 10 INJECTION INTRAVENOUS at 06:12

## 2024-11-26 NOTE — DISCHARGE NOTE NURSING/CASE MANAGEMENT/SOCIAL WORK - PATIENT PORTAL LINK FT
You can access the FollowMyHealth Patient Portal offered by [facility] by registering at the following website: [email or web address]. By joining [employer]’s FollowMyHealth portal, you will also be able to view your health information using other applications (apps) compatible with our system.

## 2024-11-26 NOTE — CHART NOTE - NSCHARTNOTEFT_GEN_A_CORE
Nutrition Follow Up Note  Source: Medical Record [X] Patient [x] Family [ ]         Diet: Consistent carbohydrate (no snacks)  Pt tolerating diet with good PO intake, eating >75% of meals Per nursing flowsheets. Pt reports good PO intake. Denies nausea, vomiting, diarrhea, constipation. Reviewed consistent carb diet. Pt had a episodes of hypoglycemic on 11-24. Discussed adequate PO intake of carbohydrates.     Enteral/Parenteral Nutrition: N/A    Current Weight: 181.8 lbs (11-18)    Pertinent Medications: MEDICATIONS  (STANDING):  apixaban 5 milliGRAM(s) Oral two times a day  dextrose 5%. 1000 milliLiter(s) (50 mL/Hr) IV Continuous <Continuous>  dextrose 5%. 1000 milliLiter(s) (100 mL/Hr) IV Continuous <Continuous>  dextrose 50% Injectable 25 Gram(s) IV Push once  dextrose 50% Injectable 12.5 Gram(s) IV Push once  dextrose 50% Injectable 25 Gram(s) IV Push once  glucagon  Injectable 1 milliGRAM(s) IntraMuscular once  glucagon  Injectable 1 milliGRAM(s) IntraMuscular once  influenza  Vaccine (HIGH DOSE) 0.5 milliLiter(s) IntraMuscular once  insulin glargine Injectable (LANTUS) 15 Unit(s) SubCutaneous at bedtime  insulin lispro (ADMELOG) corrective regimen sliding scale   SubCutaneous at bedtime  lacosamide 100 milliGRAM(s) Oral <User Schedule>  losartan 25 milliGRAM(s) Oral daily  metFORMIN 1000 milliGRAM(s) Oral two times a day with meals  pantoprazole    Tablet 40 milliGRAM(s) Oral before breakfast  polyethylene glycol 3350 17 Gram(s) Oral daily  senna 2 Tablet(s) Oral at bedtime    MEDICATIONS  (PRN):  acetaminophen     Tablet .. 650 milliGRAM(s) Oral every 6 hours PRN Mild Pain (1 - 3)  dextrose Oral Gel 15 Gram(s) Oral once PRN Blood Glucose LESS THAN 70 milliGRAM(s)/deciliter      Pertinent Labs:  11-25 Na144 mmol/L Glu 104 mg/dL[H] K+ 4.5 mmol/L Cr  0.88 mg/dL BUN 18 mg/dL 11-25 Alb 2.8 g/dL[L]        Skin: Skin intact per nursing flow sheets     Edema: DVT Per nursing flowsheets     Last BM: on 11-24 Per nursing flowsheets     Estimated Needs:   [X] No Change since Previous Assessment  [ ] Recalculated:     Previous Nutrition Diagnosis:   Altered nutrition related lab values    Nutrition Diagnosis is [X] Ongoing  - addressed with consistent carb diet, ongoing diet education       New Nutrition Diagnosis: [X] Not Applicable  [ ] Inadequate Protein Energy Intake   [ ] Inadequate Oral Intake   [ ] Excessive Energy Intake   [ ] Increased Nutrient Needs   [ ] Obesity   [ ] Altered GI Function   [ ] Unintended Weight Loss   [ ] Food & Nutrition Related Knowledge Deficit  [ ] Limited Adherence to nutrition related recommendations   [ ] Malnutrition      Interventions:   1. Recommend continuing with current plan of care  2. Encourage PO intake  3. Ongoing diet education    Monitoring & Evaluation:   [X] Weights   [X] PO Intake   [X] Follow Up (Per Protocol)  [X] Tolerance to Diet Prescription   [X] Other: Labs     RD Remains Available.  Kalyani Casarez RD

## 2024-11-26 NOTE — PROGRESS NOTE ADULT - ASSESSMENT
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 Hospital course by right soleal + peroneal DVT.    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 Mild ventriculomegaly secondary findings raising possibility of normal pressure hydrocephalus.  - Patient deferred surgery; steroid taper for mass effect  - Comprehensive Multidisciplinary Rehab Program: 3 hours a day ( 1 hr PT, 1 hr OT, 1 hr SLP), 5 days a week.  - TDD: 11/27 Home    # Seizure  - EEG without recorded seizure 11/11-14  - Seizure ppx: Vimpat 100 BID  - Rescue med: 1st line- Lorazepam 1 mg IV push for seizures lasting >3 minutes with vital sign changes. 2nd line- Briviact 100 mg IV push for seizures lasting >3 minutes refractory to lorazepam.    # HTN  - Losartan 25  - BP stable: 127/74 - 130/71 (11/26)    # Type 2 Diabetes (A1C 7.1)   - Lantus 15 HS   - Admelog standing premeal - dc   - FS ISS  - FS:103-245 (11/26)  - Metformin 500 BID (11/22) > titrate up as tolerated  - OP Dr. Andrea? > Lantus 15 HS, Janumet 50/1000 BID    # DVTs: Right soleal occlusive + right peroneal nonocclusive  - CTA neg for PE  - S/p therapeutic Lovenox > now transition to oral since there's no planned surgery  - Eliquis 5 BID  - repeat doppler BLE (11/22) > Right peroneal dvt resolved, soleal unchanged    # Enlarged Left Adnexa  - Outpatient GYN    # Pain Management:  - Tylenol PRN    # GI/Bowel:  - Senna QHS, Miralax QD  - GI ppx: Protonix    # Diet:  - Regular    ---------------  Outpatient Follow-up (Specialty/Name of physician):  Yomaira López  Neurology  [address] [place] [address], [address]  [place] [address]  Phone: [phone]  Fax: [phone]  Follow Up Time: 2 weeks    Neville Chaudhary  Neurosurgery  [address] [place] [address]  Phone: [phone]  Fax: [phone]  Follow Up Time: 2 weeks    Hal Carvajal  Vascular Medicine  [address] [place] [address], [address] [place] [id number]  Phone: [phone]  Fax: [phone]  Follow Up Time: 2 weeks

## 2024-11-26 NOTE — PROGRESS NOTE ADULT - ASSESSMENT
68 yo F with PMH of Type 2 Diabetes Mellitus, Glaucoma who was found on the ground, found to have meningioma.   Previously Intubated/extubated. Surgery deferred, managed by AED and steroids.   Found to have R soleal vein DVT, on AC. Now admitted to [facility] for acute rehab.     Uncontrolled, Type 2 Diabetes Mellitus   - HbA1C 7.1%  - hyperglycemia likely due to steroids, finished steroid therapy, now running low   - Continue Lantus 15u qhs ,Off of  lispro 5u TID AC. Continue Metformin  1000 mg bid and continue with ISS. Adjust the dose of lantus as might need to further decrease prior to discharge. At home on Janumet 50/1000 mg bid which she will restart on discharge  - continue to follow POCT closely     Bilateral frontal meningioma   Seizure due to above  - Finished steroid therapy  - continue Vimpat 100mg bid, Seizure precautions   - outpatient neurosurgery follow up   - PT/OT per PMR    DVT   - R soleal occlusive + R peroneal   - Eliquis 5mg BID    Hypertension   - continue losartan 25 daily     Left adnexal mass   - outpatient GYN follow up     DVT Prophylaxis:  - Eliquis    GI ppx:  PPI

## 2024-11-26 NOTE — DISCHARGE NOTE NURSING/CASE MANAGEMENT/SOCIAL WORK - FINANCIAL ASSISTANCE
[facility] provides services at a reduced cost to those who are determined to be eligible through [facility]’s financial assistance program. Information regarding [facility]’s financial assistance program can be found by going to [email or web address] or by calling [phone].

## 2024-11-27 ENCOUNTER — TRANSCRIPTION ENCOUNTER (OUTPATIENT)
Age: 69
End: 2024-11-27

## 2024-11-27 VITALS
TEMPERATURE: 98 F | DIASTOLIC BLOOD PRESSURE: 77 MMHG | SYSTOLIC BLOOD PRESSURE: 144 MMHG | HEART RATE: 68 BPM | OXYGEN SATURATION: 98 % | RESPIRATION RATE: 17 BRPM

## 2024-11-27 LAB — GLUCOSE BLDC GLUCOMTR-MCNC: 112 MG/DL — HIGH (ref 70–99)

## 2024-11-27 PROCEDURE — 99239 HOSP IP/OBS DSCHRG MGMT >30: CPT | Mod: GC

## 2024-11-27 PROCEDURE — 99232 SBSQ HOSP IP/OBS MODERATE 35: CPT

## 2024-11-27 RX ORDER — LACOSAMIDE 10 MG/ML
1 INJECTION INTRAVENOUS
Qty: 60 | Refills: 0
Start: 2024-11-27 | End: 2024-12-26

## 2024-11-27 RX ORDER — LOSARTAN POTASSIUM 100 MG/1
1 TABLET, FILM COATED ORAL
Qty: 30 | Refills: 0
Start: 2024-11-27 | End: 2024-12-26

## 2024-11-27 RX ORDER — SITAGLIPTIN AND METFORMIN HYDROCHLORIDE 1000; 50 MG/1; MG/1
1 TABLET, FILM COATED ORAL
Qty: 60 | Refills: 0
Start: 2024-11-27 | End: 2024-12-26

## 2024-11-27 RX ORDER — INSULIN GLARGINE 100 [IU]/ML
15 INJECTION, SOLUTION SUBCUTANEOUS
Qty: 5 | Refills: 0
Start: 2024-11-27

## 2024-11-27 RX ORDER — APIXABAN 2.5 MG/1
1 TABLET, FILM COATED ORAL
Qty: 60 | Refills: 0
Start: 2024-11-27 | End: 2024-12-26

## 2024-11-27 RX ORDER — PANTOPRAZOLE SODIUM 40 MG/1
1 TABLET, DELAYED RELEASE ORAL
Qty: 30 | Refills: 0
Start: 2024-11-27 | End: 2024-12-26

## 2024-11-27 RX ADMIN — Medication 1000 MILLIGRAM(S): at 07:57

## 2024-11-27 RX ADMIN — LOSARTAN POTASSIUM 25 MILLIGRAM(S): 100 TABLET, FILM COATED ORAL at 06:01

## 2024-11-27 RX ADMIN — APIXABAN 5 MILLIGRAM(S): 2.5 TABLET, FILM COATED ORAL at 06:00

## 2024-11-27 RX ADMIN — LACOSAMIDE 100 MILLIGRAM(S): 10 INJECTION INTRAVENOUS at 06:00

## 2024-11-27 RX ADMIN — PANTOPRAZOLE SODIUM 40 MILLIGRAM(S): 40 TABLET, DELAYED RELEASE ORAL at 06:00

## 2024-11-27 NOTE — DISCHARGE NOTE PROVIDER - ATTENDING DISCHARGE PHYSICAL EXAMINATION:
Gen - NAD, Comfortable  HEENT - NCAT, EOMI, Normal Conjunctivae  Neck - Supple, No limited ROM  Pulm - resp nonlabored  Cardiovascular -warm and well perfused, no cyanosis or pedal edema  Abdomen - Soft, NT/ND  Extremities - no peripheral edema left without calf tenderness (right not tested d/t DVT)  Neuro-     Cognitive - awake, alert, oriented x 4.  Able to follow command + answer questions appropriately     Communication - Fluent, Comprehensible, No dysarthria, No aphasia, able to repeat     Attention/memory: intact     Cranial Nerves -hearing intact. No facial asymmetry, EOMI, Shoulder shrug intact     Motor -                     LEFT    UE - ShAB 5/5, EF 5/5, EE 5/5,  5/5                    RIGHT UE - ShAB 5/5, EF 5/5, EE 5/5,   5/5                    LEFT    LE - HF 4/5, KE 5/5, DF 5/5, PF 5/5                    RIGHT LE - HF 5/5, KE 5/5, DF 5/5, PF 5/5        Sensory - Intact to LT   Psychiatric - Mood stable, Affect WNL

## 2024-11-27 NOTE — DISCHARGE NOTE PROVIDER - HOSPITAL COURSE
68yo F with PMH type 2 diabetes and glaucoma, who presented to [facility] on 11/10 after son found her on the ground + presumed seizure-like activity. Patient received a total of 15 mg of Versed during transport from first ED, Patient was intubated in ED, started on propofol and fentanyl infusions; received a total of 2500 of IV Keppra.  CTH showed 1 cm partially calcified mass along the anterior skull base (may represent meningioma) + mild mass effect on the inferior medial left frontal lobe.  No acute ICH or midline shift. Patient transferred (same day - 11/10) to [facility] for further interventions.    Patient was loaded with Vimpat 400mg.  VEEG 11/11-11/14: no seizures were recorded. 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  Hospital course complicated by right soleal + peroneal DVT. Started on therapeutic Lovenox > transition to oral AC. CT CAP shows a left adnexal mass, which was also present in US pelvis. Patient can follow up with gynecologist outpatient.    Patient was evaluated by PM&R and therapy for functional deficits and gait/ADL impairments and recommend acute rehabilitation.  Patient was cleared for discharge to [facility] on 11/18    Pt was stable upon rehab admission to  [facility]. Admitted with gait instabilty, ADL, and functional impairments.     Rehab Course unremarkable.  - repeated Duplex showed resolution of right peroneal DVT; Right soleal unchanged (no propagation)    All other medical co-morbidities were stable.     Last IDT 11/20  NSG: cont B/B  SW: Lives with her son in a PH, 2 MELODY, 1 FOS down to basement  SLP: reg/thin, mod cog, reduced inattention/working and immediate memory/quantitative reasoning/delay recall  OT: overall SV with all ADLs + transfer, except for shower transfer with close SV.  Goal: mod i  PT: SV bed mob/transfer, CGA amb 75' without AD/8steps 2 HR. Goal: mod i  Team goas: amb to bathroom mod I; recall therapy info and medical update independently  Barriers: balance, strength, endurance, cog, knee pain  TDD: 11/27 Home    Pt tolerated course of inpatient PT/OT/SLP rehab with significant functional improvements and met rehab goals prior to discharge.

## 2024-11-27 NOTE — PROGRESS NOTE ADULT - NS ATTEND OPT1A GEN_ALL_CORE
History/Medical decision making
Stable.
History/Medical decision making
History/Exam/Medical decision making
History/Medical decision making
History/Medical decision making

## 2024-11-27 NOTE — PROGRESS NOTE ADULT - ASSESSMENT
68 yo F with PMH of Type 2 Diabetes Mellitus, Glaucoma who was found on the ground, found to have meningioma.   Previously Intubated/extubated. Surgery deferred, managed by AED and steroids.   Found to have R soleal vein DVT, on AC. Now admitted to [facility] for acute rehab.     Uncontrolled, Type 2 Diabetes Mellitus   - HbA1C 7.1%  - hyperglycemia likely due to steroids, finished steroid therapy, now running low   - Continue Lantus 15u qhs ,Off of  lispro 5u TID AC. Continue Metformin  1000 mg bid and continue with ISS. Continue current dose of Lantus on discharge. Restart  Janumet 50/1000 mg bid  and monitor. If AM glucose at home below 100, decrease Lantus to 12 units hs.  - continue to follow POCT closely     Bilateral frontal meningioma   Seizure due to above  - Finished steroid therapy  - continue Vimpat 100mg bid, Seizure precautions   - outpatient neurosurgery follow up   - PT/OT per PMR    DVT   - R soleal occlusive + R peroneal   - Eliquis 5mg BID    Hypertension   - continue losartan 25 daily     Left adnexal mass   - outpatient GYN follow up     DVT Prophylaxis:  - Eliquis    GI ppx:  PPI

## 2024-11-27 NOTE — PROGRESS NOTE ADULT - REASON FOR ADMISSION
Meningioma

## 2024-11-27 NOTE — DISCHARGE NOTE PROVIDER - YES NO FOR MLM POSITIVE OR NEGATIVE COVID RESULT
,
89-year-old male patient states noted blood from urinary meatus since yesterday afternoon.  Patient states had similar episode in May and did not follow-up with urologist.  No reported fever, no vomiting.   patient with bladder scan demonstrating 166 5 cc of urine.  Case discussed with surgical house officer advised patient to have Chavez catheter placed.  Patient adamantly refuses catheter placement states he does not want anything inserted into his penis.  5am Patient is uncooperative, agitated and screaming in ED.  Patient requires Haldol and Ativan to relieve agitation.

## 2024-11-27 NOTE — DISCHARGE NOTE PROVIDER - CARE PROVIDERS DIRECT ADDRESSES
,[email or web address][email or web address][email or web address],[email or web address]

## 2024-11-27 NOTE — PROGRESS NOTE ADULT - SUBJECTIVE AND OBJECTIVE BOX
CC: Patient is a 69y old  Female who presents with a chief complaint of Meningioma     Interval History:  Patient seen and examined at bedside.  No overnight events  No complaints this morning  Vitally stable  BMG running low since pt finished steroid therapy    ALLERGIES:  Allergy Status Unknown    MEDICATIONS  (STANDING):  apixaban 5 milliGRAM(s) Oral two times a day  dextrose 5%. 1000 milliLiter(s) (50 mL/Hr) IV Continuous <Continuous>  dextrose 5%. 1000 milliLiter(s) (100 mL/Hr) IV Continuous <Continuous>  dextrose 50% Injectable 25 Gram(s) IV Push once  dextrose 50% Injectable 12.5 Gram(s) IV Push once  dextrose 50% Injectable 25 Gram(s) IV Push once  glucagon  Injectable 1 milliGRAM(s) IntraMuscular once  glucagon  Injectable 1 milliGRAM(s) IntraMuscular once  influenza  Vaccine (HIGH DOSE) 0.5 milliLiter(s) IntraMuscular once  insulin glargine Injectable (LANTUS) 17 Unit(s) SubCutaneous at bedtime  insulin lispro (ADMELOG) corrective regimen sliding scale   SubCutaneous at bedtime  insulin lispro Injectable (ADMELOG) 5 Unit(s) SubCutaneous three times a day before meals  lacosamide 100 milliGRAM(s) Oral <User Schedule>  losartan 25 milliGRAM(s) Oral daily  metFORMIN 500 milliGRAM(s) Oral <User Schedule>  pantoprazole    Tablet 40 milliGRAM(s) Oral before breakfast  polyethylene glycol 3350 17 Gram(s) Oral two times a day  senna 2 Tablet(s) Oral at bedtime    MEDICATIONS  (PRN):  acetaminophen     Tablet .. 650 milliGRAM(s) Oral every 6 hours PRN Mild Pain (1 - 3)  dextrose Oral Gel 15 Gram(s) Oral once PRN Blood Glucose LESS THAN 70 milliGRAM(s)/deciliter  LORazepam   Injectable 1 milliGRAM(s) IV Push once PRN seizure    Vital Signs Last 24 Hrs  T(F): 98 (24 Nov 2024 08:43), Max: 98 (24 Nov 2024 08:43)  HR: 65 (24 Nov 2024 08:43) (65 - 77)  BP: 117/78 (24 Nov 2024 08:43) (115/68 - 145/75)  RR: 16 (24 Nov 2024 08:43) (15 - 16)  SpO2: 98% (24 Nov 2024 08:43) (98% - 100%)  I&O's Summary        PHYSICAL EXAM:  GENERAL: NAD  NERVOUS SYSTEM:  CN II - XII intact; Sensation intact; follows commands  CHEST/LUNG: Clear to percussion bilaterally; No rales, rhonchi, wheezing, or rubs; normal respiratory effort, no intercostal retractions  HEART: Regular rate and rhythm; No murmurs, rubs, or gallops; No pitting edema  ABDOMEN: Soft, Nontender, Nondistended; Bowel sounds present; No HSM or masses  MUSCULOSKELETAL/EXTREMITIES:  2+ Peripheral Pulses, No clubbing or digital cyanosis; FROM of extremeties (pain, crepitation or contracture)  PSYCH: Appropriate affect, Alert & Oriented x 3, Good Memory; Good insight    LABS:    POCT Blood Glucose.: 131 mg/dL (24 Nov 2024 13:38)  POCT Blood Glucose.: 80 mg/dL (24 Nov 2024 13:07)  POCT Blood Glucose.: 72 mg/dL (24 Nov 2024 12:41)  POCT Blood Glucose.: 54 mg/dL (24 Nov 2024 12:14)  POCT Blood Glucose.: 59 mg/dL (24 Nov 2024 12:12)  POCT Blood Glucose.: 63 mg/dL (24 Nov 2024 11:54)  POCT Blood Glucose.: 75 mg/dL (24 Nov 2024 07:59)  POCT Blood Glucose.: 249 mg/dL (23 Nov 2024 21:29)  POCT Blood Glucose.: 109 mg/dL (23 Nov 2024 16:23)          COVID-19 PCR: NotDete (11-18-24 @ 22:30)      Care Discussed with Consultants/Other Providers: Yes  
Cc: Gait dysfunction    HPI: Patient seen and examined at bedside. No acute events overnight.   Pain controlled, no chest pain, no N/V, no Fevers/Chills. No other new ROS  Has been tolerating rehabilitation program.    acetaminophen     Tablet .. 650 milliGRAM(s) Oral every 6 hours PRN  apixaban 5 milliGRAM(s) Oral two times a day  glucagon  Injectable 1 milliGRAM(s) IntraMuscular once  glucagon  Injectable 1 milliGRAM(s) IntraMuscular once  influenza  Vaccine (HIGH DOSE) 0.5 milliLiter(s) IntraMuscular once  insulin glargine Injectable (LANTUS) 19 Unit(s) SubCutaneous at bedtime  insulin lispro (ADMELOG) corrective regimen sliding scale   SubCutaneous at bedtime  insulin lispro Injectable (ADMELOG) 7 Unit(s) SubCutaneous three times a day before meals  lacosamide 100 milliGRAM(s) Oral <User Schedule>  LORazepam   Injectable 1 milliGRAM(s) IV Push once PRN  losartan 25 milliGRAM(s) Oral daily  metFORMIN 500 milliGRAM(s) Oral <User Schedule>  pantoprazole    Tablet 40 milliGRAM(s) Oral before breakfast  polyethylene glycol 3350 17 Gram(s) Oral two times a day  senna 2 Tablet(s) Oral at bedtime      T(C): 36.6 (11-22-24 @ 19:31), Max: 36.6 (11-22-24 @ 19:31)  HR: 75 (11-23-24 @ 05:34) (71 - 75)  BP: 145/60 (11-23-24 @ 05:34) (135/68 - 145/60)  RR: 15 (11-22-24 @ 19:31) (15 - 15)  SpO2: 95% (11-22-24 @ 19:31) (95% - 95%)    In NAD  HEENT- EOMI  Heart- S1S2  Lungs- CTA bl.  Abd- + BS, NT  Ext- No calf pain  Neuro- Exam unchanged    CBC 11/19/24 reviewed  CMP 11/19/24 reviewed  CMP 11/18/24 reviewed      Imp: Patient with diagnosis of meningioma/seizure  admitted for comprehensive acute rehabilitation.    Plan:  - Continue PT/OT/SLP as indicated  - DVT prophylaxis - Continue Eliquis  - Skin- Turn q2h, check skin daily  - Continue current medications  -Active issues-   DVT - continue Eliquis  Meningioma - s/p steroid taper  Seizure - Continue Vimpat  - Patient is stable to continue current rehabilitation program.   
Cc: Gait dysfunction    HPI: Patient seen and examined at bedside. No acute events overnight.   Pain controlled, no chest pain, no N/V, no Fevers/Chills. No other new ROS  Has been tolerating rehabilitation program.    acetaminophen     Tablet .. 650 milliGRAM(s) Oral every 6 hours PRN  apixaban 5 milliGRAM(s) Oral two times a day  glucagon  Injectable 1 milliGRAM(s) IntraMuscular once  glucagon  Injectable 1 milliGRAM(s) IntraMuscular once  influenza  Vaccine (HIGH DOSE) 0.5 milliLiter(s) IntraMuscular once  insulin glargine Injectable (LANTUS) 19 Unit(s) SubCutaneous at bedtime  insulin lispro (ADMELOG) corrective regimen sliding scale   SubCutaneous at bedtime  insulin lispro Injectable (ADMELOG) 7 Unit(s) SubCutaneous three times a day before meals  lacosamide 100 milliGRAM(s) Oral <User Schedule>  LORazepam   Injectable 1 milliGRAM(s) IV Push once PRN  losartan 25 milliGRAM(s) Oral daily  metFORMIN 500 milliGRAM(s) Oral <User Schedule>  pantoprazole    Tablet 40 milliGRAM(s) Oral before breakfast  polyethylene glycol 3350 17 Gram(s) Oral two times a day  senna 2 Tablet(s) Oral at bedtime      T(C): 36.7 (11-24-24 @ 08:43), Max: 36.7 (11-24-24 @ 08:43)  HR: 65 (11-24-24 @ 08:43) (65 - 77)  BP: 117/78 (11-24-24 @ 08:43) (115/68 - 145/75)  RR: 16 (11-24-24 @ 08:43) (15 - 16)  SpO2: 98% (11-24-24 @ 08:43) (98% - 100%)      In NAD  HEENT- EOMI  Heart- S1S2  Lungs- CTA bl.  Abd- + BS, NT  Ext- No calf pain  Neuro- Exam unchanged    MR Brain 11/12/24 reviewed and interpreted by me: meningioma around anterior skull base seen    ACC: [id number] EXAM: MR BRAIN [facility] FOR SRS ORDERED BY: LALO MCKINNEY    PROCEDURE DATE: 11/12/2024        INTERPRETATION: CLINICAL INDICATION: Possible meningioma on CTH, with Quicktome.    TECHNIQUE: Multi-planar multi-sequential MR imaging of the brain was performed before and after the intravenous administration of contrast. Gadavist IV contrast dose: 7.5 cc administered.    COMPARISON: CT head 11/10/2024 under separate patient folder from [facility] (accession #[id number])    FINDINGS:  Enhancing lesion is seen along the anterior skull base/planum sphenoidale measuring 2.0 x 3.2 x 2.1 cm (22-85, ). There is associated bony hyperostosis. No parenchymal signal abnormality of the adjacent frontal lobes.    No acute infarction, intracranial hemorrhage or mass lesion. Scattered FLAIR hyperintense white matter foci are noted, most compatible with chronic small vessel disease. No abnormal intracranial enhancement is identified.    Mild ventriculomegaly with crowding of the sulci at the vertex. No extra-axial fluid collections. The skull base flow voids are present.    The visualized intraorbital contents are unremarkable. The imaged portions of the paranasal sinuses are clear. The mastoid air cells are clear. The visualized osseous structures, soft tissues and partially visualized parotid glands appear normal.    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 No parenchymal signal abnormality of the adjacent frontal lobes.    -Mild ventriculomegaly secondary findings raising possibility of normal pressure hydrocephalus.    --- End of Report ---            ARMANDO BELTRE MD; Attending Radiologist  This document has been electronically signed. Nov 12 2024 4:02PM          Imp: Patient with diagnosis of meningioma/seizure  admitted for comprehensive acute rehabilitation.    Plan:  - Continue PT/OT/SLP as indicated  - DVT prophylaxis - Continue Eliquis  - Skin- Turn q2h, check skin daily  - Continue current medications  -Active issues-   DVT - continue Eliquis  Meningioma - s/p steroid taper  Seizure - Continue Vimpat  - Patient is stable to continue current rehabilitation program.       
Patient is a 69y old  Female who presents with a chief complaint of Benign neoplasm of cerebral meninges    HPI:  68yo F with PMH type 2 diabetes and glaucoma, who presented to [facility] on 11/10 after son found her on the ground + presumed seizure-like activity. Patient received a total of 15 mg of Versed during transport from first ED, Patient was intubated in ED, started on propofol and fentanyl infusions; received a total of 2500 of IV Keppra.  CTH showed 1 cm partially calcified mass along the anterior skull base (may represent meningioma) + mild mass effect on the inferior medial left frontal lobe.  No acute ICH or midline shift. Patient transferred (same day - 11/10) to [facility] for further interventions.    Patient was loaded with Vimpat 400mg.  VEEG 11/11-11/14: no seizures were recorded. 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  Hospital course complicated by right soleal + peroneal DVT. Started on therapeutic Lovenox > transition to oral AC. CT CAP shows a left adnexal mass, which was also present in US pelvis. Patient can follow up with gynecologist outpatient.    Patient was evaluated by PM&R and therapy for functional deficits and gait/ADL impairments and recommend acute rehabilitation.  Patient was cleared for discharge to [facility] on 11/18    SUBJECTIVE/OBJECTIVE: Patient seen and evaluated while seated at bedside chair.  Patient doing well, remember it's discharge day > son and family will be picking her up.  Reviewed discharge medications (for seizure, DVT, and DM) and followups (PCP, Neuro, NSGY, Vascular).  Will be going home with AC, apixaban (30day free trial coupon provided to present to pharmacy).    REVIEW OF SYSTEMS:  Denies CP, SOB, HA, dizziness, pain, abd discomfort, or dysuria  + LLE weakness  + bilateral knee pain (better)    PHYSICAL EXAM  69y  Vital Signs Last 24 Hrs  T(C): 36.7 (11-27-24 @ 08:00), Max: 36.7 (11-26-24 @ 19:23)  T(F): 98.1 (11-27-24 @ 08:00), Max: 98.1 (11-27-24 @ 08:00)  HR: 68 (11-27-24 @ 08:00) (65 - 70)  BP: 144/77 (11-27-24 @ 08:00) (121/62 - 144/77)  RR: 17 (11-27-24 @ 08:00) (15 - 17)  SpO2: 98% (11-27-24 @ 08:00) (97% - 98%)    Gen - NAD, Comfortable  HEENT - NCAT, EOMI, Normal Conjunctivae  Neck - Supple, No limited ROM  Pulm - resp nonlabored  Cardiovascular -warm and well perfused, no cyanosis or pedal edema  Abdomen - Soft, NT/ND  Extremities - no peripheral edema left without calf tenderness (right not tested d/t DVT)  Neuro-     Cognitive - awake, alert, oriented x 4.  Able to follow command + answer questions appropriately     Communication - Fluent, Comprehensible, No dysarthria, No aphasia, able to repeat     Attention/memory: intact     Cranial Nerves -hearing intact. No facial asymmetry, EOMI, Shoulder shrug intact     Motor -                     LEFT    UE - ShAB 5/5, EF 5/5, EE 5/5,  5/5                    RIGHT UE - ShAB 5/5, EF 5/5, EE 5/5,   5/5                    LEFT    LE - HF 4/5, KE 5/5, DF 5/5, PF 5/5                    RIGHT LE - HF 5/5, KE 5/5, DF 5/5, PF 5/5        Sensory - Intact to LT   Psychiatric - Mood stable, Affect WNL    RECENT LABS:                      11.3   4.19  )-----------( 343      ( 25 Nov 2024 06:02 )             36.8     11-25    144  |  107  |  18  ----------------------------<  104[H]  4.5   |  34[H]  |  0.88    Ca    9.2      25 Nov 2024 06:02    TPro  6.8  /  Alb  2.8[L]  /  TBili  0.5  /  DBili  x   /  AST  16  /  ALT  53[H]  /  AlkPhos  68  11-25    LIVER FUNCTIONS - ( 25 Nov 2024 06:02 )  Alb: 2.8 g/dL / Pro: 6.8 g/dL / ALK PHOS: 68 U/L / ALT: 53 U/L / AST: 16 U/L / GGT: x           CAPILLARY BLOOD GLUCOSE  POCT Blood Glucose.: 112 mg/dL (27 Nov 2024 07:55)  POCT Blood Glucose.: 197 mg/dL (26 Nov 2024 21:40)  POCT Blood Glucose.: 149 mg/dL (26 Nov 2024 16:37)  POCT Blood Glucose.: 173 mg/dL (26 Nov 2024 11:32)    Allergies  No Known Allergies  Allergy Status Unknown    MEDICATIONS  (STANDING):  apixaban 5 milliGRAM(s) Oral two times a day  dextrose 5%. 1000 milliLiter(s) (50 mL/Hr) IV Continuous <Continuous>  dextrose 5%. 1000 milliLiter(s) (100 mL/Hr) IV Continuous <Continuous>  dextrose 50% Injectable 25 Gram(s) IV Push once  dextrose 50% Injectable 12.5 Gram(s) IV Push once  dextrose 50% Injectable 25 Gram(s) IV Push once  glucagon  Injectable 1 milliGRAM(s) IntraMuscular once  glucagon  Injectable 1 milliGRAM(s) IntraMuscular once  influenza  Vaccine (HIGH DOSE) 0.5 milliLiter(s) IntraMuscular once  insulin glargine Injectable (LANTUS) 15 Unit(s) SubCutaneous at bedtime  insulin lispro (ADMELOG) corrective regimen sliding scale   SubCutaneous at bedtime  lacosamide 100 milliGRAM(s) Oral <User Schedule>  losartan 25 milliGRAM(s) Oral daily  metFORMIN 1000 milliGRAM(s) Oral two times a day with meals  pantoprazole    Tablet 40 milliGRAM(s) Oral before breakfast  polyethylene glycol 3350 17 Gram(s) Oral daily  senna 2 Tablet(s) Oral at bedtime    MEDICATIONS  (PRN):  acetaminophen     Tablet .. 650 milliGRAM(s) Oral every 6 hours PRN Mild Pain (1 - 3)  dextrose Oral Gel 15 Gram(s) Oral once PRN Blood Glucose LESS THAN 70 milliGRAM(s)/deciliter  
Patient is a 69y old  Female who presents with a chief complaint of Meningioma (20 Nov 2024 10:20)    Patient seen and examined at bedside. No acute overnight events. No complaints at time of evaluation.     ALLERGIES:  No Known Allergies  Allergy Status Unknown    MEDICATIONS  (STANDING):  apixaban 5 milliGRAM(s) Oral two times a day  glucagon  Injectable 1 milliGRAM(s) IntraMuscular once  glucagon  Injectable 1 milliGRAM(s) IntraMuscular once  influenza  Vaccine (HIGH DOSE) 0.5 milliLiter(s) IntraMuscular once  insulin glargine Injectable (LANTUS) 15 Unit(s) SubCutaneous at bedtime  insulin lispro (ADMELOG) corrective regimen sliding scale   SubCutaneous at bedtime  insulin lispro (ADMELOG) corrective regimen sliding scale   SubCutaneous at bedtime  insulin lispro Injectable (ADMELOG) 7 Unit(s) SubCutaneous three times a day before meals  lacosamide 100 milliGRAM(s) Oral <User Schedule>  losartan 25 milliGRAM(s) Oral daily  pantoprazole    Tablet 40 milliGRAM(s) Oral before breakfast  polyethylene glycol 3350 17 Gram(s) Oral two times a day  senna 2 Tablet(s) Oral at bedtime    MEDICATIONS  (PRN):  acetaminophen     Tablet .. 650 milliGRAM(s) Oral every 6 hours PRN Mild Pain (1 - 3)  LORazepam   Injectable 1 milliGRAM(s) IV Push once PRN seizure    Vital Signs Last 24 Hrs  T(F): 97.8 (20 Nov 2024 08:09), Max: 98 (20 Nov 2024 05:02)  HR: 55 (20 Nov 2024 08:09) (55 - 67)  BP: 137/72 (20 Nov 2024 08:09) (132/66 - 137/72)  RR: 16 (20 Nov 2024 08:09) (16 - 16)  SpO2: 98% (20 Nov 2024 08:09) (96% - 98%)  I&O's Summary    BMI (kg/m2): 29.4 (11-18-24 @ 20:11)    PHYSICAL EXAM:  General: NAD, A/O x 3  ENT: MMM, no tonsilar exudate  Neck: Supple, No JVD  Lungs: Clear to auscultation bilaterally, no wheezes. Good air entry bilaterally   Cardio: RRR, S1/S2, No murmurs  Abdomen: Soft, Nontender, Nondistended; Bowel sounds present  Extremities: No calf tenderness, No pitting edema    LABS:                        11.2   7.27  )-----------( 333      ( 19 Nov 2024 05:48 )             34.9       11-19    140  |  105  |  13  ----------------------------<  283  3.8   |  27  |  0.83    Ca    9.0      19 Nov 2024 05:48    TPro  7.0  /  Alb  2.9  /  TBili  0.5  /  DBili  x   /  AST  52  /  ALT  85  /  AlkPhos  73  11-19    POCT Blood Glucose.: 216 mg/dL (20 Nov 2024 11:51)  POCT Blood Glucose.: 181 mg/dL (20 Nov 2024 08:04)  POCT Blood Glucose.: 353 mg/dL (19 Nov 2024 21:30)  POCT Blood Glucose.: 317 mg/dL (19 Nov 2024 16:28)    Urinalysis Basic - ( 19 Nov 2024 05:48 )    Color: x / Appearance: x / SG: x / pH: x  Gluc: 283 mg/dL / Ketone: x  / Bili: x / Urobili: x   Blood: x / Protein: x / Nitrite: x   Leuk Esterase: x / RBC: x / WBC x   Sq Epi: x / Non Sq Epi: x / Bacteria: x    COVID-19 PCR: NotDetec (11-18-24 @ 22:30)    RADIOLOGY & ADDITIONAL TESTS:     Care Discussed with Consultants/Other Providers:   
CC: Patient is a 69y old  Female who presents with a chief complaint of Meningioma (22 Nov 2024 11:08)      Interval History:  Chart reviewed  Patient seen and examined  No major overnight events  No complaints this morning    Care Discussed with Consultants/Other Providers: Yes    ALLERGIES:  Allergy Status Unknown    MEDICATIONS  (STANDING):  apixaban 5 milliGRAM(s) Oral two times a day  glucagon  Injectable 1 milliGRAM(s) IntraMuscular once  glucagon  Injectable 1 milliGRAM(s) IntraMuscular once  influenza  Vaccine (HIGH DOSE) 0.5 milliLiter(s) IntraMuscular once  insulin glargine Injectable (LANTUS) 19 Unit(s) SubCutaneous at bedtime  insulin lispro (ADMELOG) corrective regimen sliding scale   SubCutaneous at bedtime  insulin lispro Injectable (ADMELOG) 7 Unit(s) SubCutaneous three times a day before meals  lacosamide 100 milliGRAM(s) Oral <User Schedule>  losartan 25 milliGRAM(s) Oral daily  metFORMIN 500 milliGRAM(s) Oral <User Schedule>  pantoprazole    Tablet 40 milliGRAM(s) Oral before breakfast  polyethylene glycol 3350 17 Gram(s) Oral two times a day  senna 2 Tablet(s) Oral at bedtime    MEDICATIONS  (PRN):  acetaminophen     Tablet .. 650 milliGRAM(s) Oral every 6 hours PRN Mild Pain (1 - 3)  LORazepam   Injectable 1 milliGRAM(s) IV Push once PRN seizure    Vital Signs Last 24 Hrs  T(F): 98 (22 Nov 2024 07:42), Max: 98 (21 Nov 2024 19:26)  HR: 68 (22 Nov 2024 07:42) (66 - 71)  BP: 147/77 (22 Nov 2024 07:42) (110/63 - 147/77)  RR: 16 (22 Nov 2024 07:42) (15 - 16)  SpO2: 98% (22 Nov 2024 07:42) (97% - 98%)  I&O's Summary    BMI (kg/m2): 29.4 (11-18-24 @ 20:11)    PHYSICAL EXAM:  GENERAL: NAD  NERVOUS SYSTEM:  CN II - X grossly intact; Sensation intact; follows commands  CHEST/LUNG: Clear to percussion bilaterally; No rales, rhonchi, wheezing, or rubs; normal respiratory effort, no intercostal retractions  HEART: Regular rate and rhythm; No murmurs, rubs, or gallops; No pitting edema  ABDOMEN: Soft, Nontender, Nondistended; Bowel sounds present; No HSM or masses  MUSCULOSKELETAL/EXTREMITIES:  No clubbing or digital cyanosis  PSYCH: Appropriate affect, Alert    LABS:  POCT Blood Glucose.: 167 mg/dL (22 Nov 2024 11:40)  POCT Blood Glucose.: 187 mg/dL (22 Nov 2024 08:03)  POCT Blood Glucose.: 178 mg/dL (21 Nov 2024 22:03)  POCT Blood Glucose.: 239 mg/dL (21 Nov 2024 16:40)  
Patient is a 69y old  Female who presents with a chief complaint of Benign neoplasm of cerebral meninges    HPI:  70yo F with PMH type 2 diabetes and glaucoma, who presented to [facility] on [facility]/10 after son found her on the ground + presumed seizure-like activity. Patient received a total of 15 mg of Versed during transport from first ED, Patient was intubated in ED, started on propofol and fentanyl infusions; received a total of 2500 of IV Keppra.  CTH showed 1 cm partially calcified mass along the anterior skull base (may represent meningioma) + mild mass effect on the inferior medial left frontal lobe.  No acute ICH or midline shift. Patient transferred (same day - 11/10) to [facility] for further interventions.    Patient was loaded with Vimpat 400mg.  VEEG 11/11-11/14: no seizures were recorded. 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  Hospital course complicated by right soleal + peroneal DVT. Started on therapeutic Lovenox > transition to oral AC. CT CAP shows a left adnexal mass, which was also present in US pelvis. Patient can follow up with gynecologist outpatient.    Patient was evaluated by PM&R and therapy for functional deficits and gait/ADL impairments and recommend acute rehabilitation.  Patient was cleared for discharge to [place] on 11/18    SUBJECTIVE/OBJECTIVE: Patient seen and evaluated while sitting in bedside chair. Overall doing well, ambulating to bathroom without issues.  Left leg weaker than right, but better than initial admission.   Patient informed of recent duplex result.   Medications sent to [facility] but [facility] does not have her insurance information.  Inform patient that she won't know final cost of her meds til insurance information is provided  Weekend event: Hypoglycemic > insulin adjusted.      REVIEW OF SYSTEMS:  Denies CP, SOB, HA, dizziness, pain, abd discomfort, or dysuria  + LLE weakness  + bilateral knee pain (better)    PHYSICAL EXAM  69y  Vital Signs Last 24 Hrs  T(C): 36.7 (11-25-24 @ 07:30), Max: 36.7 (11-25-24 @ 07:30)  T(F): 98 (11-25-24 @ 07:30), Max: 98 (11-25-24 @ 07:30)  HR: 68 (11-25-24 @ 07:30) (63 - 68)  BP: 136/73 (11-25-24 @ 07:30) (136/73 - 147/75)  RR: 17 (11-25-24 @ 07:30) (14 - 17)  SpO2: 100% (11-25-24 @ 07:30) (98% - 100%)    Gen - NAD, Comfortable  HEENT - NCAT, EOMI, Normal Conjunctivae  Neck - Supple, No limited ROM  Pulm - resp nonlabored  Cardiovascular -warm and well perfused, no cyanosis or pedal edema  Abdomen - Soft, NT/ND  Extremities - no peripheral edema left without calf tenderness (right not tested d/t DVT)  Neuro-     Cognitive - awake, alert, oriented x 4.  Able to follow command + answer questions appropriately     Communication - Fluent, Comprehensible, No dysarthria, No aphasia, able to repeat     Attention/.memory: intact     Cranial Nerves -hearing intact. No facial asymmetry, EOMI, Shoulder shrug intact     Motor -                     LEFT    UE - ShAB 5/5, EF 5/5, EE 5/5,  5/5                    RIGHT UE - ShAB 5/5, EF 5/5, EE 5/5,   5/5                    LEFT    LE - HF 4/5, KE 4/5, DF 5/5, PF 5/5                    RIGHT LE - HF 5/5, KE 5/5, DF 5/5, PF 5/5        Sensory - Intact to LT   Psychiatric - Mood stable, Affect WNL    RECENT LABS:                      11.3   4.19  )-----------( 343      ( 25 Nov 2024 06:02 )             36.8     11-25    144  |  107  |  18  ----------------------------<  104[H]  4.5   |  34[H]  |  0.88    Ca    9.2      25 Nov 2024 06:02    TPro  6.8  /  Alb  2.8[L]  /  TBili  0.5  /  DBili  x   /  AST  16  /  ALT  53[H]  /  AlkPhos  68  11-25    LIVER FUNCTIONS - ( 25 Nov 2024 06:02 )  Alb: 2.8 g/dL / Pro: 6.8 g/dL / ALK PHOS: 68 U/L / ALT: 53 U/L / AST: 16 U/L / GGT: x           CAPILLARY BLOOD GLUCOSE  POCT Blood Glucose.: 89 mg/dL (25 Nov 2024 11:46)  POCT Blood Glucose.: 101 mg/dL (25 Nov 2024 07:48)  POCT Blood Glucose.: 154 mg/dL (24 Nov 2024 22:00)  POCT Blood Glucose.: 168 mg/dL (24 Nov 2024 16:49)  POCT Blood Glucose.: 131 mg/dL (24 Nov 2024 13:38)  POCT Blood Glucose.: 80 mg/dL (24 Nov 2024 13:07)  POCT Blood Glucose.: 72 mg/dL (24 Nov 2024 12:41)    Allergies  No Known Allergies  Allergy Status Unknown    MEDICATIONS  (STANDING):  apixaban 5 milliGRAM(s) Oral two times a day  dextrose 5%. 1000 milliLiter(s) (50 mL/Hr) IV Continuous <Continuous>  dextrose 5%. 1000 milliLiter(s) (100 mL/Hr) IV Continuous <Continuous>  dextrose 50% Injectable 25 Gram(s) IV Push once  dextrose 50% Injectable 12.5 Gram(s) IV Push once  dextrose 50% Injectable 25 Gram(s) IV Push once  glucagon  Injectable 1 milliGRAM(s) IntraMuscular once  glucagon  Injectable 1 milliGRAM(s) IntraMuscular once  influenza  Vaccine (HIGH DOSE) 0.5 milliLiter(s) IntraMuscular once  insulin glargine Injectable (LANTUS) 17 Unit(s) SubCutaneous at bedtime  insulin lispro (ADMELOG) corrective regimen sliding scale   SubCutaneous at bedtime  insulin lispro Injectable (ADMELOG) 5 Unit(s) SubCutaneous three times a day before meals  lacosamide 100 milliGRAM(s) Oral <User Schedule>  losartan 25 milliGRAM(s) Oral daily  metFORMIN 500 milliGRAM(s) Oral <User Schedule>  pantoprazole    Tablet 40 milliGRAM(s) Oral before breakfast  polyethylene glycol 3350 17 Gram(s) Oral two times a day  senna 2 Tablet(s) Oral at bedtime    MEDICATIONS  (PRN):  acetaminophen     Tablet .. 650 milliGRAM(s) Oral every 6 hours PRN Mild Pain (1 - 3)  dextrose Oral Gel 15 Gram(s) Oral once PRN Blood Glucose LESS THAN 70 milliGRAM(s)/deciliter  LORazepam   Injectable 1 milliGRAM(s) IV Push once PRN seizure  
CC: Patient is a 69y old  Female who presents with a chief complaint of Meningioma (21 Nov 2024 10:13)      Interval History:  Chart reviewed  Patient seen and examined  No major overnight events  Complaints this morning hyperglycemia  POCT Blood Glucose.: 276 mg/dL (21 Nov 2024 11:35)  POCT Blood Glucose.: 274 mg/dL (21 Nov 2024 07:43)  POCT Blood Glucose.: 248 mg/dL (20 Nov 2024 22:03)  POCT Blood Glucose.: 339 mg/dL (20 Nov 2024 16:34)      Care Discussed with Consultants/Other Providers: Yes    ALLERGIES:  Allergy Status Unknown    MEDICATIONS  (STANDING):  apixaban 5 milliGRAM(s) Oral two times a day  glucagon  Injectable 1 milliGRAM(s) IntraMuscular once  glucagon  Injectable 1 milliGRAM(s) IntraMuscular once  influenza  Vaccine (HIGH DOSE) 0.5 milliLiter(s) IntraMuscular once  insulin glargine Injectable (LANTUS) 15 Unit(s) SubCutaneous at bedtime  insulin lispro (ADMELOG) corrective regimen sliding scale   SubCutaneous at bedtime  insulin lispro (ADMELOG) corrective regimen sliding scale   SubCutaneous at bedtime  insulin lispro Injectable (ADMELOG) 7 Unit(s) SubCutaneous three times a day before meals  lacosamide 100 milliGRAM(s) Oral <User Schedule>  losartan 25 milliGRAM(s) Oral daily  metFORMIN 500 milliGRAM(s) Oral <User Schedule>  pantoprazole    Tablet 40 milliGRAM(s) Oral before breakfast  polyethylene glycol 3350 17 Gram(s) Oral two times a day  senna 2 Tablet(s) Oral at bedtime    MEDICATIONS  (PRN):  acetaminophen     Tablet .. 650 milliGRAM(s) Oral every 6 hours PRN Mild Pain (1 - 3)  LORazepam   Injectable 1 milliGRAM(s) IV Push once PRN seizure    Vital Signs Last 24 Hrs  T(F): 97.9 (21 Nov 2024 07:47), Max: 97.9 (20 Nov 2024 19:17)  HR: 57 (21 Nov 2024 07:47) (57 - 70)  BP: 130/75 (21 Nov 2024 07:47) (110/62 - 134/70)  RR: 16 (21 Nov 2024 07:47) (15 - 16)  SpO2: 98% (21 Nov 2024 07:47) (97% - 98%)  I&O's Summary    BMI (kg/m2): 29.4 (11-18-24 @ 20:11)    PHYSICAL EXAM:  GENERAL: NAD  NERVOUS SYSTEM:  CN II - X grossly intact; Sensation intact; follows commands  CHEST/LUNG: Clear to percussion bilaterally; No rales, rhonchi, wheezing, or rubs; normal respiratory effort, no intercostal retractions  HEART: Regular rate and rhythm; No murmurs, rubs, or gallops; No pitting edema  ABDOMEN: Soft, Nontender, Nondistended; Bowel sounds present; No HSM or masses  MUSCULOSKELETAL/EXTREMITIES:  No clubbing or digital cyanosis  PSYCH: Appropriate affect, Alert    LABS:                        11.2   7.27  )-----------( 333      ( 19 Nov 2024 05:48 )             34.9       11-19    140  |  105  |  13  ----------------------------<  283  3.8   |  27  |  0.83    Ca    9.0      19 Nov 2024 05:48    TPro  7.0  /  Alb  2.9  /  TBili  0.5  /  DBili  x   /  AST  52  /  ALT  85  /  AlkPhos  73  11-19           COVID-19 PCR: NotDetec (11-18-24 @ 22:30)        
CC: Patient is a 69y old  Female who presents with a chief complaint of Meningioma (25 Nov 2024 12:11)      Interval History:  Patient seen and examined at bedside.  No overnight events  No complaints this morning  Denies CP, SOB, abd pain, N/V, dizziness    ALLERGIES:  Allergy Status Unknown    MEDICATIONS  (STANDING):  apixaban 5 milliGRAM(s) Oral two times a day  dextrose 5%. 1000 milliLiter(s) (50 mL/Hr) IV Continuous <Continuous>  dextrose 5%. 1000 milliLiter(s) (100 mL/Hr) IV Continuous <Continuous>  dextrose 50% Injectable 25 Gram(s) IV Push once  dextrose 50% Injectable 12.5 Gram(s) IV Push once  dextrose 50% Injectable 25 Gram(s) IV Push once  glucagon  Injectable 1 milliGRAM(s) IntraMuscular once  glucagon  Injectable 1 milliGRAM(s) IntraMuscular once  influenza  Vaccine (HIGH DOSE) 0.5 milliLiter(s) IntraMuscular once  insulin glargine Injectable (LANTUS) 15 Unit(s) SubCutaneous at bedtime  insulin lispro (ADMELOG) corrective regimen sliding scale   SubCutaneous at bedtime  lacosamide 100 milliGRAM(s) Oral <User Schedule>  losartan 25 milliGRAM(s) Oral daily  metFORMIN 1000 milliGRAM(s) Oral two times a day with meals  pantoprazole    Tablet 40 milliGRAM(s) Oral before breakfast  polyethylene glycol 3350 17 Gram(s) Oral two times a day  senna 2 Tablet(s) Oral at bedtime    MEDICATIONS  (PRN):  acetaminophen     Tablet .. 650 milliGRAM(s) Oral every 6 hours PRN Mild Pain (1 - 3)  dextrose Oral Gel 15 Gram(s) Oral once PRN Blood Glucose LESS THAN 70 milliGRAM(s)/deciliter  LORazepam   Injectable 1 milliGRAM(s) IV Push once PRN seizure    Vital Signs Last 24 Hrs  T(F): 98 (25 Nov 2024 07:30), Max: 98 (25 Nov 2024 07:30)  HR: 68 (25 Nov 2024 07:30) (63 - 68)  BP: 136/73 (25 Nov 2024 07:30) (136/73 - 147/75)  RR: 17 (25 Nov 2024 07:30) (14 - 17)  SpO2: 100% (25 Nov 2024 07:30) (98% - 100%)  I&O's Summary        PHYSICAL EXAM:    General: NAD, A/O x 3  ENT: MMM, no tonsilar exudate  Neck: Supple, No JVD  Lungs: Clear to auscultation bilaterally, no wheezes. Good air entry bilaterally   Cardio: RRR, S1/S2, No murmurs  Abdomen: Soft, Nontender, Nondistended; Bowel sounds present  Extremities: No calf tenderness, No pitting     LABS:                        11.3   4.19  )-----------( 343      ( 25 Nov 2024 06:02 )             36.8       11-25    144  |  107  |  18  ----------------------------<  104  4.5   |  34  |  0.88    Ca    9.2      25 Nov 2024 06:02    TPro  6.8  /  Alb  2.8  /  TBili  0.5  /  DBili  x   /  AST  16  /  ALT  53  /  AlkPhos  68  11-25                              POCT Blood Glucose.: 89 mg/dL (25 Nov 2024 11:46)  POCT Blood Glucose.: 101 mg/dL (25 Nov 2024 07:48)  POCT Blood Glucose.: 154 mg/dL (24 Nov 2024 22:00)  POCT Blood Glucose.: 168 mg/dL (24 Nov 2024 16:49)  POCT Blood Glucose.: 131 mg/dL (24 Nov 2024 13:38)  POCT Blood Glucose.: 80 mg/dL (24 Nov 2024 13:07)      Urinalysis Basic - ( 25 Nov 2024 06:02 )    Color: x / Appearance: x / SG: x / pH: x  Gluc: 104 mg/dL / Ketone: x  / Bili: x / Urobili: x   Blood: x / Protein: x / Nitrite: x   Leuk Esterase: x / RBC: x / WBC x   Sq Epi: x / Non Sq Epi: x / Bacteria: x        COVID-19 PCR: NotDetec (11-18-24 @ 22:30)      Care Discussed with Consultants/Other Providers: Yes. Rehab provider  
Patient is a 69y old  Female who presents with a chief complaint of Benign neoplasm of cerebral meninges    HPI:  70yo F with PMH type 2 diabetes and glaucoma, who presented to [facility] on 11/10 after son found her on the ground + presumed seizure-like activity. Patient received a total of 15 mg of Versed during transport from first ED, Patient was intubated in ED, started on propofol and fentanyl infusions; received a total of 2500 of IV Keppra.  CTH showed 1 cm partially calcified mass along the anterior skull base (may represent meningioma) + mild mass effect on the inferior medial left frontal lobe.  No acute ICH or midline shift. Patient transferred (same day - 11/10) to [facility] for further interventions.    Patient was loaded with Vimpat 400mg.  VEEG 11/11-11/14: no seizures were recorded. 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  Hospital course complicated by right soleal + peroneal DVT. Started on therapeutic Lovenox > transition to oral AC. CT CAP shows a left adnexal mass, which was also present in US pelvis. Patient can follow up with gynecologist outpatient.    Patient was evaluated by PM&R and therapy for functional deficits and gait/ADL impairments and recommend acute rehabilitation.  Patient was cleared for discharge to [facility] on 11/18    SUBJECTIVE/OBJECTIVE: Patient seen and evaluated at bedside > tolerating therapy, doing well.  Cleared to ambulate within room and to bathroom independently.  Spoke to patient in regards to her community pharmacy> will try for [facility] at this time.  PCP: Dr. Neville Lyles     REVIEW OF SYSTEMS:  Denies CP, SOB, HA, dizziness, pain, abd discomfort, or dysuria  + LLE weakness  + bilateral knee pain (better)    PHYSICAL EXAM  69y  Vital Signs Last 24 Hrs  T(C): 36.7 (11-22-24 @ 07:42), Max: 36.7 (11-21-24 @ 19:26)  T(F): 98 (11-22-24 @ 07:42), Max: 98 (11-21-24 @ 19:26)  HR: 68 (11-22-24 @ 07:42) (66 - 71)  BP: 147/77 (11-22-24 @ 07:42) (110/63 - 147/77)  RR: 16 (11-22-24 @ 07:42) (15 - 16)  SpO2: 98% (11-22-24 @ 07:42) (97% - 98%)    Gen - NAD, Comfortable  HEENT - NCAT, EOMI, Normal Conjunctivae  Neck - Supple, No limited ROM  Pulm - resp nonlabored  Cardiovascular -warm and well perfused, no cyanosis or pedal edema  Abdomen - Soft, NT/ND  Extremities - no peripheral edema left without calf tenderness (right not tested d/t DVT)  Neuro-     Cognitive - awake, alert, oriented x 4.  Able to follow command + answer questions appropriately     Communication - Fluent, Comprehensible, No dysarthria, No aphasia, able to repeat     Attention/.memory: intact     Cranial Nerves -hearing intact. No facial asymmetry, EOMI, Shoulder shrug intact     Motor -                     LEFT    UE - ShAB 5/5, EF 5/5, EE 5/5,  5/5                    RIGHT UE - ShAB 5/5, EF 5/5, EE 5/5,   5/5                    LEFT    LE - HF 4/5, KE 4/5, DF 5/5, PF 5/5                    RIGHT LE - HF 5/5, KE 5/5, DF 5/5, PF 5/5        Sensory - Intact to LT   Psychiatric - Mood stable, Affect WNL    RECENT LABS:                        11.2   7.27  )-----------( 333      ( 19 Nov 2024 05:48 )             34.9     11-19    140  |  105  |  13  ----------------------------<  283[H]  3.8   |  27  |  0.83    Ca    9.0      19 Nov 2024 05:48    TPro  7.0  /  Alb  2.9[L]  /  TBili  0.5  /  DBili  x   /  AST  52[H]  /  ALT  85[H]  /  AlkPhos  73  11-19    LIVER FUNCTIONS - ( 19 Nov 2024 05:48 )  Alb: 2.9 g/dL / Pro: 7.0 g/dL / ALK PHOS: 73 U/L / ALT: 85 U/L / AST: 52 U/L / GGT: x           CAPILLARY BLOOD GLUCOSE  POCT Blood Glucose.: 187 mg/dL (22 Nov 2024 08:03)  POCT Blood Glucose.: 178 mg/dL (21 Nov 2024 22:03)  POCT Blood Glucose.: 239 mg/dL (21 Nov 2024 16:40)  POCT Blood Glucose.: 276 mg/dL (21 Nov 2024 11:35)    Allergies  No Known Allergies  Allergy Status Unknown    MEDICATIONS  (STANDING):  apixaban 5 milliGRAM(s) Oral two times a day  glucagon  Injectable 1 milliGRAM(s) IntraMuscular once  glucagon  Injectable 1 milliGRAM(s) IntraMuscular once  influenza  Vaccine (HIGH DOSE) 0.5 milliLiter(s) IntraMuscular once  insulin glargine Injectable (LANTUS) 19 Unit(s) SubCutaneous at bedtime  insulin lispro (ADMELOG) corrective regimen sliding scale   SubCutaneous at bedtime  insulin lispro Injectable (ADMELOG) 7 Unit(s) SubCutaneous three times a day before meals  lacosamide 100 milliGRAM(s) Oral <User Schedule>  losartan 25 milliGRAM(s) Oral daily  metFORMIN 500 milliGRAM(s) Oral <User Schedule>  pantoprazole    Tablet 40 milliGRAM(s) Oral before breakfast  polyethylene glycol 3350 17 Gram(s) Oral two times a day  senna 2 Tablet(s) Oral at bedtime    MEDICATIONS  (PRN):  acetaminophen     Tablet .. 650 milliGRAM(s) Oral every 6 hours PRN Mild Pain (1 - 3)  LORazepam   Injectable 1 milliGRAM(s) IV Push once PRN seizure  
Patient is a 69y old  Female who presents with a chief complaint of Benign neoplasm of cerebral meninges    HPI:  70yo F with PMH type 2 diabetes and glaucoma, who presented to [facility] on 11/10 after son found her on the ground + presumed seizure-like activity. Patient received a total of 15 mg of Versed during transport from first ED, Patient was intubated in ED, started on propofol and fentanyl infusions; received a total of 2500 of IV Keppra.  CTH showed 1 cm partially calcified mass along the anterior skull base (may represent meningioma) + mild mass effect on the inferior medial left frontal lobe.  No acute ICH or midline shift. Patient transferred (same day - 11/10) to [facility] for further interventions.    Patient was loaded with Vimpat 400mg.  VEEG 11/11-11/14: no seizures were recorded. 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  Hospital course complicated by right soleal + peroneal DVT. Started on therapeutic Lovenox > transition to oral AC. CT CAP shows a left adnexal mass, which was also present in US pelvis. Patient can follow up with gynecologist outpatient.    Patient was evaluated by PM&R and therapy for functional deficits and gait/ADL impairments and recommend acute rehabilitation.  Patient was cleared for discharge to [facility] on 11/18    SUBJECTIVE/OBJECTIVE: Patient seen and evaluated during OT session> working on schedule.  Overall feeling well, no issues.  Updated in regards to medications at time of discharge > sent to [facility] and discussed cost of AC and seizure meds.     REVIEW OF SYSTEMS:  Denies CP, SOB, HA, dizziness, pain, abd discomfort, or dysuria  + LLE weakness  + bilateral knee pain (better)    PHYSICAL EXAM  69y  Vital Signs Last 24 Hrs  T(C): 36.6 (11-26-24 @ 08:31), Max: 36.6 (11-26-24 @ 08:31)  T(F): 97.9 (11-26-24 @ 08:31), Max: 97.9 (11-26-24 @ 08:31)  HR: 67 (11-26-24 @ 08:31) (67 - 72)  BP: 130/71 (11-26-24 @ 08:31) (127/74 - 130/71)  RR: 15 (11-26-24 @ 08:31) (12 - 15)  SpO2: 99% (11-26-24 @ 08:31) (99% - 99%)    Gen - NAD, Comfortable  HEENT - NCAT, EOMI, Normal Conjunctivae  Neck - Supple, No limited ROM  Pulm - resp nonlabored  Cardiovascular -warm and well perfused, no cyanosis or pedal edema  Abdomen - Soft, NT/ND  Extremities - no peripheral edema left without calf tenderness (right not tested d/t DVT)  Neuro-     Cognitive - awake, alert, oriented x 4.  Able to follow command + answer questions appropriately     Communication - Fluent, Comprehensible, No dysarthria, No aphasia, able to repeat     Attention/memory: intact     Cranial Nerves -hearing intact. No facial asymmetry, EOMI, Shoulder shrug intact     Motor -                     LEFT    UE - ShAB 5/5, EF 5/5, EE 5/5,  5/5                    RIGHT UE - ShAB 5/5, EF 5/5, EE 5/5,   5/5                    LEFT    LE - HF 4/5, KE 4/5, DF 5/5, PF 5/5                    RIGHT LE - HF 5/5, KE 5/5, DF 5/5, PF 5/5        Sensory - Intact to LT   Psychiatric - Mood stable, Affect WNL    RECENT LABS:                      11.3   4.19  )-----------( 343      ( 25 Nov 2024 06:02 )             36.8     11-25    144  |  107  |  18  ----------------------------<  104[H]  4.5   |  34[H]  |  0.88    Ca    9.2      25 Nov 2024 06:02    TPro  6.8  /  Alb  2.8[L]  /  TBili  0.5  /  DBili  x   /  AST  16  /  ALT  53[H]  /  AlkPhos  68  11-25    LIVER FUNCTIONS - ( 25 Nov 2024 06:02 )  Alb: 2.8 g/dL / Pro: 6.8 g/dL / ALK PHOS: 68 U/L / ALT: 53 U/L / AST: 16 U/L / GGT: x           CAPILLARY BLOOD GLUCOSE  POCT Blood Glucose.: 103 mg/dL (26 Nov 2024 08:27)  POCT Blood Glucose.: 245 mg/dL (25 Nov 2024 22:14)  POCT Blood Glucose.: 202 mg/dL (25 Nov 2024 16:44)  POCT Blood Glucose.: 89 mg/dL (25 Nov 2024 11:46)    Allergies  No Known Allergies  Allergy Status Unknown    MEDICATIONS  (STANDING):  apixaban 5 milliGRAM(s) Oral two times a day  dextrose 5%. 1000 milliLiter(s) (50 mL/Hr) IV Continuous <Continuous>  dextrose 5%. 1000 milliLiter(s) (100 mL/Hr) IV Continuous <Continuous>  dextrose 50% Injectable 25 Gram(s) IV Push once  dextrose 50% Injectable 12.5 Gram(s) IV Push once  dextrose 50% Injectable 25 Gram(s) IV Push once  glucagon  Injectable 1 milliGRAM(s) IntraMuscular once  glucagon  Injectable 1 milliGRAM(s) IntraMuscular once  influenza  Vaccine (HIGH DOSE) 0.5 milliLiter(s) IntraMuscular once  insulin glargine Injectable (LANTUS) 15 Unit(s) SubCutaneous at bedtime  insulin lispro (ADMELOG) corrective regimen sliding scale   SubCutaneous at bedtime  lacosamide 100 milliGRAM(s) Oral <User Schedule>  losartan 25 milliGRAM(s) Oral daily  metFORMIN 1000 milliGRAM(s) Oral two times a day with meals  pantoprazole    Tablet 40 milliGRAM(s) Oral before breakfast  polyethylene glycol 3350 17 Gram(s) Oral two times a day  senna 2 Tablet(s) Oral at bedtime    MEDICATIONS  (PRN):  acetaminophen     Tablet .. 650 milliGRAM(s) Oral every 6 hours PRN Mild Pain (1 - 3)  dextrose Oral Gel 15 Gram(s) Oral once PRN Blood Glucose LESS THAN 70 milliGRAM(s)/deciliter  
Patient is a 69y old  Female who presents with a chief complaint of Benign neoplasm of cerebral meninges     (19 Nov 2024 14:39)    HPI:  68yo F with PMH type 2 diabetes and glaucoma, who presented to [facility] on 11/10 after son found her on the ground + presumed seizure-like activity. Patient received a total of 15 mg of Versed during transport from first ED, Patient was intubated in ED, started on propofol and fentanyl infusions; received a total of 2500 of IV Keppra.  CTH showed 1 cm partially calcified mass along the anterior skull base (may represent meningioma) + mild mass effect on the inferior medial left frontal lobe.  No acute ICH or midline shift. Patient transferred (same day - 11/10) to [facility] for further interventions.    Patient was loaded with Vimpat 400mg.  VEEG 11/11-11/14: no seizures were recorded. 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  Hospital course complicated by right soleal + peroneal DVT. Started on therapeutic Lovenox > transition to oral AC. CT CAP shows a left adnexal mass, which was also present in US pelvis. Patient can follow up with gynecologist outpatient.    Patient was evaluated by PM&R and therapy for functional deficits and gait/ADL impairments and recommend acute rehabilitation.  Patient was cleared for discharge to [facility] on 11/18    SUBJECTIVE/OBJECTIVE: Patient seen and evaluated during OT session> doing well, was able to do mock ADLs (cooking, cleaning)/ambulated down [place]x without AD.  Patient feeling well    REVIEW OF SYSTEMS:  Denies CP, SOB, HA, dizziness, pain, abd discomfort, or dysuria  +LLE weakness    PHYSICAL EXAM  69y  Vital Signs Last 24 Hrs  T(C): 36.6 (20 Nov 2024 08:09), Max: 36.7 (20 Nov 2024 05:02)  T(F): 97.8 (20 Nov 2024 08:09), Max: 98 (20 Nov 2024 05:02)  HR: 55 (20 Nov 2024 08:09) (55 - 67)  BP: 137/72 (20 Nov 2024 08:09) (132/66 - 137/72)  RR: 16 (20 Nov 2024 08:09) (16 - 16)  SpO2: 98% (20 Nov 2024 08:09) (96% - 98%)    Gen - NAD, Comfortable  HEENT - NCAT, EOMI, PERRLA - L reactive, but less reactive compared to R, Normal Conjunctivae  Neck - Supple, No limited ROM  Pulm - resp nonlabored  Cardiovascular -warm and well perfused, no cyanosis or pedal edema  Abdomen - Soft, NT/ND  Extremities - no peripheral edema left without calf tenderness (right not tested d/t DVT)  Neuro-     Cognitive - awake, alert, oriented x 4.  Able to follow command + answer questions appropriately     Communication - Fluent, Comprehensible, No dysarthria, No aphasia, able to repeat; identify 2 obj/func 2/2     Attention/.memory: intact     Cranial Nerves -hearing intact. No facial asymmetry. Tongue midline + good lateral movement (no thrush), EOMI, Shoulder shrug intact     Motor -                     LEFT    UE - ShAB 5/5, EF 5/5, EE 5/5,  5/5                    RIGHT UE - ShAB 5/5, EF 5/5, EE 5/5,   5/5                    LEFT    LE - HF 4/5, KE 4/5, DF 5/5, PF 5/5                    RIGHT LE - HF 5/5, KE 5/5, DF 5/5, PF 5/5        Sensory - Intact to LT   Psychiatric - Mood stable, Affect WNL    RECENT LABS:                        11.2   7.27  )-----------( 333      ( 19 Nov 2024 05:48 )             34.9     11-19    140  |  105  |  13  ----------------------------<  283[H]  3.8   |  27  |  0.83    Ca    9.0      19 Nov 2024 05:48    TPro  7.0  /  Alb  2.9[L]  /  TBili  0.5  /  DBili  x   /  AST  52[H]  /  ALT  85[H]  /  AlkPhos  73  11-19    LIVER FUNCTIONS - ( 19 Nov 2024 05:48 )  Alb: 2.9 g/dL / Pro: 7.0 g/dL / ALK PHOS: 73 U/L / ALT: 85 U/L / AST: 52 U/L / GGT: x           CAPILLARY BLOOD GLUCOSE  POCT Blood Glucose.: 181 mg/dL (20 Nov 2024 08:04)  POCT Blood Glucose.: 353 mg/dL (19 Nov 2024 21:30)  POCT Blood Glucose.: 317 mg/dL (19 Nov 2024 16:28)  POCT Blood Glucose.: 364 mg/dL (19 Nov 2024 11:55)    Allergies  No Known Allergies  Allergy Status Unknown    MEDICATIONS  (STANDING):  apixaban 5 milliGRAM(s) Oral two times a day  glucagon  Injectable 1 milliGRAM(s) IntraMuscular once  glucagon  Injectable 1 milliGRAM(s) IntraMuscular once  influenza  Vaccine (HIGH DOSE) 0.5 milliLiter(s) IntraMuscular once  insulin glargine Injectable (LANTUS) 15 Unit(s) SubCutaneous at bedtime  insulin lispro (ADMELOG) corrective regimen sliding scale   SubCutaneous at bedtime  insulin lispro (ADMELOG) corrective regimen sliding scale   SubCutaneous at bedtime  insulin lispro Injectable (ADMELOG) 7 Unit(s) SubCutaneous three times a day before meals  lacosamide 100 milliGRAM(s) Oral <User Schedule>  losartan 25 milliGRAM(s) Oral daily  pantoprazole    Tablet 40 milliGRAM(s) Oral before breakfast  polyethylene glycol 3350 17 Gram(s) Oral two times a day  senna 2 Tablet(s) Oral at bedtime    MEDICATIONS  (PRN):  acetaminophen     Tablet .. 650 milliGRAM(s) Oral every 6 hours PRN Mild Pain (1 - 3)  LORazepam   Injectable 1 milliGRAM(s) IV Push once PRN seizure  
CC: Patient is a 69y old  Female who presents with a chief complaint of Meningioma     Interval History:  Patient seen and examined at bedside.  No overnight events  No complaints this morning  Vitally stable    ALLERGIES:  Allergy Status Unknown    MEDICATIONS  (STANDING):  apixaban 5 milliGRAM(s) Oral two times a day  glucagon  Injectable 1 milliGRAM(s) IntraMuscular once  glucagon  Injectable 1 milliGRAM(s) IntraMuscular once  influenza  Vaccine (HIGH DOSE) 0.5 milliLiter(s) IntraMuscular once  insulin glargine Injectable (LANTUS) 19 Unit(s) SubCutaneous at bedtime  insulin lispro (ADMELOG) corrective regimen sliding scale   SubCutaneous at bedtime  insulin lispro Injectable (ADMELOG) 7 Unit(s) SubCutaneous three times a day before meals  lacosamide 100 milliGRAM(s) Oral <User Schedule>  losartan 25 milliGRAM(s) Oral daily  metFORMIN 500 milliGRAM(s) Oral <User Schedule>  pantoprazole    Tablet 40 milliGRAM(s) Oral before breakfast  polyethylene glycol 3350 17 Gram(s) Oral two times a day  senna 2 Tablet(s) Oral at bedtime    MEDICATIONS  (PRN):  acetaminophen     Tablet .. 650 milliGRAM(s) Oral every 6 hours PRN Mild Pain (1 - 3)  LORazepam   Injectable 1 milliGRAM(s) IV Push once PRN seizure    Vital Signs Last 24 Hrs  T(F): 97.9 (22 Nov 2024 19:31), Max: 97.9 (22 Nov 2024 19:31)  HR: 75 (23 Nov 2024 05:34) (71 - 75)  BP: 145/60 (23 Nov 2024 05:34) (135/68 - 145/60)  RR: 15 (22 Nov 2024 19:31) (15 - 15)  SpO2: 95% (22 Nov 2024 19:31) (95% - 95%)  I&O's Summary    BMI (kg/m2): 29.4 (11-18-24 @ 20:11)    PHYSICAL EXAM:  GENERAL: NAD  NERVOUS SYSTEM:  CN II - XII intact; Sensation intact; follows commands  CHEST/LUNG: Clear to percussion bilaterally; No rales, rhonchi, wheezing, or rubs; normal respiratory effort, no intercostal retractions  HEART: Regular rate and rhythm; No murmurs, rubs, or gallops; No pitting edema  ABDOMEN: Soft, Nontender, Nondistended; Bowel sounds present; No HSM or masses  MUSCULOSKELETAL/EXTREMITIES:  2+ Peripheral Pulses, No clubbing or digital cyanosis; FROM of extremeties (pain, crepitation or contracture)    POCT Blood Glucose.: 209 mg/dL (23 Nov 2024 11:56)  POCT Blood Glucose.: 120 mg/dL (23 Nov 2024 08:22)  POCT Blood Glucose.: 248 mg/dL (22 Nov 2024 21:35)  POCT Blood Glucose.: 74 mg/dL (22 Nov 2024 16:38)  POCT Blood Glucose.: 69 mg/dL (22 Nov 2024 16:24)          COVID-19 PCR: NotDetec (11-18-24 @ 22:30)      Care Discussed with Consultants/Other Providers: Yes  
CC: Patient is a 69y old  Female who presents with a chief complaint of Meningioma (26 Nov 2024 10:51)      Interval History:  Patient seen and examined at bedside.  No overnight events  No complaints this morning  Denies CP, SOB, abd pain, N/V/D    ALLERGIES:  Allergy Status Unknown    MEDICATIONS  (STANDING):  apixaban 5 milliGRAM(s) Oral two times a day  dextrose 5%. 1000 milliLiter(s) (50 mL/Hr) IV Continuous <Continuous>  dextrose 5%. 1000 milliLiter(s) (100 mL/Hr) IV Continuous <Continuous>  dextrose 50% Injectable 25 Gram(s) IV Push once  dextrose 50% Injectable 12.5 Gram(s) IV Push once  dextrose 50% Injectable 25 Gram(s) IV Push once  glucagon  Injectable 1 milliGRAM(s) IntraMuscular once  glucagon  Injectable 1 milliGRAM(s) IntraMuscular once  influenza  Vaccine (HIGH DOSE) 0.5 milliLiter(s) IntraMuscular once  insulin glargine Injectable (LANTUS) 15 Unit(s) SubCutaneous at bedtime  insulin lispro (ADMELOG) corrective regimen sliding scale   SubCutaneous at bedtime  lacosamide 100 milliGRAM(s) Oral <User Schedule>  losartan 25 milliGRAM(s) Oral daily  metFORMIN 1000 milliGRAM(s) Oral two times a day with meals  pantoprazole    Tablet 40 milliGRAM(s) Oral before breakfast  polyethylene glycol 3350 17 Gram(s) Oral daily  senna 2 Tablet(s) Oral at bedtime    MEDICATIONS  (PRN):  acetaminophen     Tablet .. 650 milliGRAM(s) Oral every 6 hours PRN Mild Pain (1 - 3)  dextrose Oral Gel 15 Gram(s) Oral once PRN Blood Glucose LESS THAN 70 milliGRAM(s)/deciliter    Vital Signs Last 24 Hrs  T(F): 97.9 (26 Nov 2024 08:31), Max: 97.9 (26 Nov 2024 08:31)  HR: 67 (26 Nov 2024 08:31) (67 - 72)  BP: 130/71 (26 Nov 2024 08:31) (127/74 - 130/71)  RR: 15 (26 Nov 2024 08:31) (12 - 15)  SpO2: 99% (26 Nov 2024 08:31) (99% - 99%)  I&O's Summary        PHYSICAL EXAM:  General: NAD, A/O x 3  ENT: MMM, no tonsilar exudate  Neck: Supple, No JVD  Lungs: Clear to auscultation bilaterally, no wheezes. Good air entry bilaterally   Cardio: RRR, S1/S2, No murmurs  Abdomen: Soft, Nontender, Nondistended; Bowel sounds present  Extremities: No calf tenderness, No pitting     LABS:                        11.3   4.19  )-----------( 343      ( 25 Nov 2024 06:02 )             36.8       11-25    144  |  107  |  18  ----------------------------<  104  4.5   |  34  |  0.88    Ca    9.2      25 Nov 2024 06:02    TPro  6.8  /  Alb  2.8  /  TBili  0.5  /  DBili  x   /  AST  16  /  ALT  53  /  AlkPhos  68  11-25                              POCT Blood Glucose.: 173 mg/dL (26 Nov 2024 11:32)  POCT Blood Glucose.: 103 mg/dL (26 Nov 2024 08:27)  POCT Blood Glucose.: 245 mg/dL (25 Nov 2024 22:14)  POCT Blood Glucose.: 202 mg/dL (25 Nov 2024 16:44)  POCT Blood Glucose.: 89 mg/dL (25 Nov 2024 11:46)      Urinalysis Basic - ( 25 Nov 2024 06:02 )    Color: x / Appearance: x / SG: x / pH: x  Gluc: 104 mg/dL / Ketone: x  / Bili: x / Urobili: x   Blood: x / Protein: x / Nitrite: x   Leuk Esterase: x / RBC: x / WBC x   Sq Epi: x / Non Sq Epi: x / Bacteria: x        COVID-19 PCR: NotDetec (11-18-24 @ 22:30)      Care Discussed with Consultants/Other Providers: Yes. Rehab provider  
CC: Patient is a 69y old  Female who presents with a chief complaint of Meningioma (27 Nov 2024 08:00)      Interval History:  Patient seen and examined at bedside.  No overnight events  No complaints this morning  Denies CP, SOB, abd pain, N/V/D    ALLERGIES:  Allergy Status Unknown    MEDICATIONS  (STANDING):  apixaban 5 milliGRAM(s) Oral two times a day  dextrose 5%. 1000 milliLiter(s) (50 mL/Hr) IV Continuous <Continuous>  dextrose 5%. 1000 milliLiter(s) (100 mL/Hr) IV Continuous <Continuous>  dextrose 50% Injectable 25 Gram(s) IV Push once  dextrose 50% Injectable 12.5 Gram(s) IV Push once  dextrose 50% Injectable 25 Gram(s) IV Push once  glucagon  Injectable 1 milliGRAM(s) IntraMuscular once  glucagon  Injectable 1 milliGRAM(s) IntraMuscular once  influenza  Vaccine (HIGH DOSE) 0.5 milliLiter(s) IntraMuscular once  insulin glargine Injectable (LANTUS) 15 Unit(s) SubCutaneous at bedtime  insulin lispro (ADMELOG) corrective regimen sliding scale   SubCutaneous at bedtime  lacosamide 100 milliGRAM(s) Oral <User Schedule>  losartan 25 milliGRAM(s) Oral daily  metFORMIN 1000 milliGRAM(s) Oral two times a day with meals  pantoprazole    Tablet 40 milliGRAM(s) Oral before breakfast  polyethylene glycol 3350 17 Gram(s) Oral daily  senna 2 Tablet(s) Oral at bedtime    MEDICATIONS  (PRN):  acetaminophen     Tablet .. 650 milliGRAM(s) Oral every 6 hours PRN Mild Pain (1 - 3)  dextrose Oral Gel 15 Gram(s) Oral once PRN Blood Glucose LESS THAN 70 milliGRAM(s)/deciliter    Vital Signs Last 24 Hrs  T(F): 98.1 (27 Nov 2024 08:00), Max: 98.1 (27 Nov 2024 08:00)  HR: 68 (27 Nov 2024 08:00) (65 - 70)  BP: 144/77 (27 Nov 2024 08:00) (121/62 - 144/77)  RR: 17 (27 Nov 2024 08:00) (15 - 17)  SpO2: 98% (27 Nov 2024 08:00) (97% - 98%)  I&O's Summary        PHYSICAL EXAM:  General: NAD, A/O x 3  ENT: MMM, no tonsilar exudate  Neck: Supple, No JVD  Lungs: Clear to auscultation bilaterally, no wheezes. Good air entry bilaterally   Cardio: RRR, S1/S2, No murmurs  Abdomen: Soft, Nontender, Nondistended; Bowel sounds present  Extremities: No calf tenderness, No pitting     LABS:                        11.3   4.19  )-----------( 343      ( 25 Nov 2024 06:02 )             36.8       11-25    144  |  107  |  18  ----------------------------<  104  4.5   |  34  |  0.88    Ca    9.2      25 Nov 2024 06:02    TPro  6.8  /  Alb  2.8  /  TBili  0.5  /  DBili  x   /  AST  16  /  ALT  53  /  AlkPhos  68  11-25                              POCT Blood Glucose.: 112 mg/dL (27 Nov 2024 07:55)  POCT Blood Glucose.: 197 mg/dL (26 Nov 2024 21:40)  POCT Blood Glucose.: 149 mg/dL (26 Nov 2024 16:37)  POCT Blood Glucose.: 173 mg/dL (26 Nov 2024 11:32)      Urinalysis Basic - ( 25 Nov 2024 06:02 )    Color: x / Appearance: x / SG: x / pH: x  Gluc: 104 mg/dL / Ketone: x  / Bili: x / Urobili: x   Blood: x / Protein: x / Nitrite: x   Leuk Esterase: x / RBC: x / WBC x   Sq Epi: x / Non Sq Epi: x / Bacteria: x        COVID-19 PCR: NotDetec (11-18-24 @ 22:30)      Care Discussed with Consultants/Other Providers: Yes.  Rehab provider  
  Patient is a 69y old  Female who presents with a chief complaint of Meningioma (20 Nov 2024 14:44)      HPI:  68yo F with PMH type 2 diabetes and glaucoma, who presented to [facility] on [facility]/10 after son found her on the ground + presumed seizure-like activity. Patient received a total of 15 mg of Versed during transport from first ED, Patient was intubated in ED, started on propofol and fentanyl infusions; received a total of 2500 of IV Keppra.  CTH showed 1 cm partially calcified mass along the anterior skull base (may represent meningioma) + mild mass effect on the inferior medial left frontal lobe.  No acute ICH or midline shift. Patient transferred (same day - 11/10) to [facility] for further interventions.    Patient was loaded with Vimpat 400mg.  VEEG 11/11-11/14: no seizures were recorded. 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  Hospital course complicated by right soleal + peroneal DVT. Started on therapeutic Lovenox > transition to oral AC. CT CAP shows a left adnexal mass, which was also present in US pelvis. Patient can follow up with gynecologist outpatient.    Patient was evaluated by PM&R and therapy for functional deficits and gait/ADL impairments and recommend acute rehabilitation.  Patient was cleared for discharge to [place] [facility] on 11/18   (18 Nov 2024 15:33)      PAST MEDICAL & SURGICAL HISTORY:  Glaucoma      Diabetes mellitus      No significant past surgical history          MEDICATIONS  (STANDING):  apixaban 5 milliGRAM(s) Oral two times a day  glucagon  Injectable 1 milliGRAM(s) IntraMuscular once  glucagon  Injectable 1 milliGRAM(s) IntraMuscular once  influenza  Vaccine (HIGH DOSE) 0.5 milliLiter(s) IntraMuscular once  insulin glargine Injectable (LANTUS) 15 Unit(s) SubCutaneous at bedtime  insulin lispro (ADMELOG) corrective regimen sliding scale   SubCutaneous at bedtime  insulin lispro (ADMELOG) corrective regimen sliding scale   SubCutaneous at bedtime  insulin lispro Injectable (ADMELOG) 7 Unit(s) SubCutaneous three times a day before meals  lacosamide 100 milliGRAM(s) Oral <User Schedule>  losartan 25 milliGRAM(s) Oral daily  pantoprazole    Tablet 40 milliGRAM(s) Oral before breakfast  polyethylene glycol 3350 17 Gram(s) Oral two times a day  senna 2 Tablet(s) Oral at bedtime    MEDICATIONS  (PRN):  acetaminophen     Tablet .. 650 milliGRAM(s) Oral every 6 hours PRN Mild Pain (1 - 3)  LORazepam   Injectable 1 milliGRAM(s) IV Push once PRN seizure      Allergies    No Known Allergies  Allergy Status Unknown    Intolerances          VITALS  69y  Vital Signs Last 24 Hrs  T(C): 36.6 (21 Nov 2024 07:47), Max: 36.6 (20 Nov 2024 19:17)  T(F): 97.9 (21 Nov 2024 07:47), Max: 97.9 (20 Nov 2024 19:17)  HR: 57 (21 Nov 2024 07:47) (57 - 70)  BP: 130/75 (21 Nov 2024 07:47) (110/62 - 134/70)  BP(mean): --  RR: 16 (21 Nov 2024 07:47) (15 - 16)  SpO2: 98% (21 Nov 2024 07:47) (97% - 98%)    Parameters below as of 21 Nov 2024 07:47  Patient On (Oxygen Delivery Method): room air      Daily     Daily         RECENT LABS:                      CAPILLARY BLOOD GLUCOSE      POCT Blood Glucose.: 274 mg/dL (21 Nov 2024 07:43)  POCT Blood Glucose.: 248 mg/dL (20 Nov 2024 22:03)  POCT Blood Glucose.: 339 mg/dL (20 Nov 2024 16:34)  POCT Blood Glucose.: 216 mg/dL (20 Nov 2024 11:51)

## 2024-11-27 NOTE — PROGRESS NOTE ADULT - NS ATTEND AMEND GEN_ALL_CORE FT
I have personally seen and examined the patient.  I fully participated in the care of this patient.  I have made amendments to the documentation where necessary, and agree with the history, physical exam, and plan as documented above  Patient seen while working with OT, doing well, looking forward to going home tomorrow
I have personally seen and examined the patient.  I fully participated in the care of this patient.  I have made amendments to the documentation where necessary, and agree with the history, physical exam, and plan as documented above  Patient seen at bedside while in the wheelchair  doing well, no complaints  Doppler LE reviewed  discussed going home this week
I have personally seen and examined the patient.  I fully participated in the care of this patient.  I have made amendments to the documentation where necessary, and agree with the history, physical exam, and plan as documented above  Patient seen at bedside, doing well no complaints  discussed IDT meeting and going home next week  BP controlled, this AM noted to be in 140's --continue to monitor. Discuss with hospitalist
I have personally seen and examined the patient.  I fully participated in the care of this patient.  I have made amendments to the documentation where necessary, and agree with the history, physical exam, and plan as documented above  Patient seen while working with OT, doing well. no complaints  Reached out to Dr Chaudhary yesterday via teams to confirm no steroids needed-- confirmed  Discussed DC next week 11/27  Interdisciplinary team meeting today with speech therapist, occupational therapist, physical therapist, social work and nursing where medical updates provided, progress with therapies and goals discussed. Plan of care reviewed. Team conference note documented on abdi.
I have personally seen and examined the patient.  I fully participated in the care of this patient.  I have made amendments to the documentation where necessary, and agree with the history, physical exam, and plan as documented above  DC home today with family

## 2024-11-27 NOTE — DISCHARGE NOTE PROVIDER - NSDCMRMEDTOKEN_GEN_ALL_CORE_FT
acetaminophen 325 mg oral tablet: 2 tab(s) orally every 6 hours As needed Mild Pain (1 - 3)  apixaban 5 mg oral tablet: 1 tab(s) orally 2 times a day  insulin glargine 100 units/mL subcutaneous solution: 15 unit(s) subcutaneous once a day (at bedtime)  lacosamide 100 mg oral tablet: 1 tab(s) orally 2 times a day MDD: 200  losartan 25 mg oral tablet: 1 tab(s) orally once a day  pantoprazole 40 mg oral delayed release tablet: 1 tab(s) orally once a day (before a meal)  sitagliptin-metformin 50 mg-1000 mg oral tablet: 1 tab(s) orally 2 times a day taken with breakfast and dinner   acetaminophen 325 mg oral tablet: 2 tab(s) orally every 6 hours As needed Mild Pain (1 - 3)  apixaban 5 mg oral tablet: 1 tab(s) orally 2 times a day  Basaglar KwikPen 100 units/mL subcutaneous solution: 15 unit(s) subcutaneous once a day (at bedtime) unit(s) subcutaneous once a day  Janumet 50 mg-1000 mg oral tablet: 1 tab(s) orally 2 times a day  lacosamide 100 mg oral tablet: 1 tab(s) orally 2 times a day MDD: 200  losartan 25 mg oral tablet: 1 tab(s) orally once a day  pantoprazole 40 mg oral delayed release tablet: 1 tab(s) orally once a day (before a meal)

## 2024-11-27 NOTE — DISCHARGE NOTE PROVIDER - NPI NUMBER (FOR SYSADMIN USE ONLY) :
[[id number]],[[id number]] [[id number]],[[id number]],[[id number]]

## 2024-11-27 NOTE — DISCHARGE NOTE PROVIDER - CARE PROVIDER_API CALL
Neville Chaudhary  Neurosurgery  [address] [place] [address]  Phone: [phone]  Fax: [phone]  Follow Up Time: 2 weeks    Yomaira López  Neurology  [address] [place] [address] [address]  [place] [address]  Phone: [phone]  Fax: [phone]  Follow Up Time: 2 weeks    Hal Carvajal  Vascular Medicine  [address] [place] [address]  Phone: [phone]  Fax: [phone]  Follow Up Time: 2 weeks    Farzana Jordan  Physical/Rehab Medicine  [address] [place] [address]  Phone: [phone]  Fax: [phone]  Follow Up Time: 1 month   Neville Chaudhary  Neurosurgery  [address] [place] [address]  Phone: [phone]  Fax: [phone]  Follow Up Time: 2 weeks    Yomaira López  Neurology  [address] [place] [address], [facility] [address]  [place] [address]  Phone: [phone]  Fax: [phone]  Follow Up Time: 2 weeks    Hal Carvajal  Vascular Medicine  [address] [place] [address], [address] [place] [address]  Phone: [phone]  Fax: [phone]  Follow Up Time: 2 weeks    Farzana Jordan  Physical/Rehab Medicine  [address] [place] [address]  Phone: [phone]  Fax: [phone]  Follow Up Time: 1 month    GYN,   For Enlarged Left Adnexa  Phone: (   )    -  Fax: (   )    -  Follow Up Time: 2 weeks

## 2024-11-27 NOTE — PROGRESS NOTE ADULT - TIME BILLING
Time spent includes direct patient care  (interview and examination of patient), discussion with other providers, support staff and/or patient's family members, review of medical records, ordering diagnostic tests and analyzing results, and documentation.
Time spent includes direct patient care  (interview and examination of patient), discussion with other providers, support staff and/or patient's family members, review of medical records, ordering diagnostic tests and analyzing results, and documentation.
Time spent includes direct patient care (interview and examination of patient), discussion with other providers, support staff and review of medical records, ordering diagnostic tests and analyzing results, and documentation.
Time spent includes direct patient care  (interview and examination of patient), discussion with other providers, support staff and/or patient's family members, review of medical records, ordering diagnostic tests and analyzing results, and documentation.

## 2024-11-27 NOTE — DISCHARGE NOTE PROVIDER - PROVIDER TOKENS
PROVIDER:[TOKEN:[[id number]],FOLLOWUP:[2 weeks]],PROVIDER:[TOKEN:[[id number]],FOLLOWUP:[2 weeks]],PROVIDER:[TOKEN:[[id number]],FOLLOWUP:[2 weeks]],PROVIDER:[TOKEN:[[id number]],FOLLOWUP:[1 month]] PROVIDER:[TOKEN:[[id number]],FOLLOWUP:[2 weeks]],PROVIDER:[TOKEN:[[id number]],FOLLOWUP:[2 weeks]],PROVIDER:[TOKEN:[[id number]],FOLLOWUP:[2 weeks]],PROVIDER:[TOKEN:[[id number]],FOLLOWUP:[1 month]],FREE:[LAST:[GYN],PHONE:[(   )    -],FAX:[(   )    -],ADDRESS:[For Enlarged Left Adnexa],FOLLOWUP:[2 weeks]]

## 2024-11-27 NOTE — DISCHARGE NOTE PROVIDER - NSDCCPCAREPLAN_GEN_ALL_CORE_FT
PRINCIPAL DISCHARGE DIAGNOSIS  Diagnosis: Meningioma  Assessment and Plan of Treatment: you were admitted for seizure > which was induced by meningioma compressing on bilateral frontal lobe.  you deferred surgery.  now only on seizure medications.  Please follow up with neurology and neurosurgery      SECONDARY DISCHARGE DIAGNOSES  Diagnosis: DVT, lower extremity  Assessment and Plan of Treatment: you were found to have 2 blood clots to the right leg.  most recent scan shows that 1 of the clot has resolved, the other one remains unchanged.  Please continue with blood thinner.    Follow up with Vascular to monitor status and how long you'll need to be on blood thinner for     PRINCIPAL DISCHARGE DIAGNOSIS  Diagnosis: Meningioma  Assessment and Plan of Treatment: you were admitted for seizure > which was induced by meningioma compressing on bilateral frontal lobe.  you deferred surgery.  now only on seizure medications.  Please follow up with neurology and neurosurgery      SECONDARY DISCHARGE DIAGNOSES  Diagnosis: DVT, lower extremity  Assessment and Plan of Treatment: you were found to have 2 blood clots to the right leg.  most recent scan shows that 1 of the clot has resolved, the other one remains unchanged.  Please continue with blood thinner.    Follow up with Vascular to monitor status and how long you'll need to be on blood thinner for    Diagnosis: Adnexal fullness  Assessment and Plan of Treatment: Incidental find of enlarged left adnexa  Please follow up with GYN

## 2024-11-27 NOTE — PROGRESS NOTE ADULT - ASSESSMENT
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 Hospital course by right soleal + peroneal DVT.    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 Mild ventriculomegaly secondary findings raising possibility of normal pressure hydrocephalus.  - Patient deferred surgery; steroid taper for mass effect  - Comprehensive Multidisciplinary Rehab Program: 3 hours a day ( 1 hr PT, 1 hr OT, 1 hr SLP), 5 days a week > completed; to be discharged home   - TDD: 11/27 Home    # Seizure  - EEG without recorded seizure 11/11-14  - Seizure ppx: Vimpat 100 BID  - Rescue med: 1st line- Lorazepam 1 mg IV push for seizures lasting >3 minutes with vital sign changes. 2nd line- Briviact 100 mg IV push for seizures lasting >3 minutes refractory to lorazepam.    # HTN  - Losartan 25  - BP stable: 121/62 - 144/77 (11/27)    # Type 2 Diabetes (A1C 7.1)   - Lantus 15 HS   - Admelog standing premeal - dc   - FS ISS  - FS:112 - 197 (11/27)  - Metformin 500 BID (11/22) > titrate up as tolerated  - Upon discharge, will continue with home regimen: Lantus 15 HS, Janumet 50/1000 BID; advised to monitor and keep log of FS    # DVTs: Right soleal occlusive + right peroneal nonocclusive  - CTA neg for PE  - S/p therapeutic Lovenox > now transition to oral since there's no planned surgery  - Eliquis 5 BID  - repeat doppler BLE (11/22) > Right peroneal dvt resolved, soleal unchanged  - Will follow up with vascular upon discharge    # Enlarged Left Adnexa  - Outpatient GYN    # Pain Management:  - Tylenol PRN    # GI/Bowel:  - Senna QHS, Miralax QD  - GI ppx: Protonix    # Diet:  - Regular    ---------------  Outpatient Follow-up (Specialty/Name of physician):  Yomaira López  Neurology  [address] [place] [address], [address]  [place] [address]  Phone: [phone]  Fax: [phone]  Follow Up Time: 2 weeks    Neville Chaudhary  [address] [address]  Phone: [phone]  Fax: [phone]  Follow Up Time: 2 weeks    Hal Carvajal  Vascular Medicine  [address] [place] [address]  Phone: [phone]  Fax: [phone]  Follow Up Time: 2 weeks

## 2024-12-02 ENCOUNTER — NON-APPOINTMENT (OUTPATIENT)
Age: 69
End: 2024-12-02

## 2024-12-02 PROBLEM — E11.9 TYPE 2 DIABETES MELLITUS WITHOUT COMPLICATIONS: Chronic | Status: ACTIVE | Noted: 2024-11-10

## 2024-12-17 NOTE — ED ADULT NURSE NOTE - AS PAIN REST
Quality 47: Advance Care Plan: Advance Care Planning discussed and documented; advance care plan or surrogate decision maker documented in the medical record.
Quality 226: Preventive Care And Screening: Tobacco Use: Screening And Cessation Intervention: Patient screened for tobacco use and is an ex/non-smoker
Detail Level: Detailed
Quality 130: Documentation Of Current Medications In The Medical Record: Current Medications Documented
Quality 431: Preventive Care And Screening: Unhealthy Alcohol Use - Screening: Patient not identified as an unhealthy alcohol user when screened for unhealthy alcohol use using a systematic screening method
0 (no pain/absence of nonverbal indicators of pain)

## 2024-12-18 ENCOUNTER — APPOINTMENT (OUTPATIENT)
Dept: NEUROSURGERY | Facility: CLINIC | Age: 69
End: 2024-12-18
Payer: MEDICARE

## 2024-12-18 ENCOUNTER — NON-APPOINTMENT (OUTPATIENT)
Age: 69
End: 2024-12-18

## 2024-12-18 VITALS
SYSTOLIC BLOOD PRESSURE: 150 MMHG | DIASTOLIC BLOOD PRESSURE: 80 MMHG | RESPIRATION RATE: 16 BRPM | BODY MASS INDEX: 26.03 KG/M2 | OXYGEN SATURATION: 98 % | HEIGHT: 66 IN | TEMPERATURE: 98.1 F | WEIGHT: 162 LBS | HEART RATE: 78 BPM

## 2024-12-18 DIAGNOSIS — D32.9 BENIGN NEOPLASM OF MENINGES, UNSPECIFIED: ICD-10-CM

## 2024-12-18 PROCEDURE — 99203 OFFICE O/P NEW LOW 30 MIN: CPT

## 2024-12-20 PROBLEM — D32.9 MENINGIOMA: Status: ACTIVE | Noted: 2024-12-20

## 2024-12-31 ENCOUNTER — APPOINTMENT (OUTPATIENT)
Dept: NEUROLOGY | Facility: CLINIC | Age: 69
End: 2024-12-31

## 2024-12-31 VITALS
SYSTOLIC BLOOD PRESSURE: 148 MMHG | HEART RATE: 72 BPM | HEIGHT: 66 IN | DIASTOLIC BLOOD PRESSURE: 79 MMHG | WEIGHT: 162 LBS | BODY MASS INDEX: 26.03 KG/M2

## 2024-12-31 PROBLEM — G40.209 FOCAL EPILEPSY WITH IMPAIRMENT OF CONSCIOUSNESS: Status: ACTIVE | Noted: 2024-12-31

## 2024-12-31 PROCEDURE — 99215 OFFICE O/P EST HI 40 MIN: CPT

## 2024-12-31 RX ORDER — LACOSAMIDE 100 MG/1
100 TABLET ORAL
Qty: 60 | Refills: 5 | Status: ACTIVE | COMMUNITY
Start: 2024-12-31 | End: 1900-01-01

## 2025-01-03 PROCEDURE — 97530 THERAPEUTIC ACTIVITIES: CPT | Mod: GP

## 2025-01-03 PROCEDURE — 80053 COMPREHEN METABOLIC PANEL: CPT

## 2025-01-03 PROCEDURE — 36415 COLL VENOUS BLD VENIPUNCTURE: CPT

## 2025-01-03 PROCEDURE — 85025 COMPLETE CBC W/AUTO DIFF WBC: CPT

## 2025-01-03 PROCEDURE — 97112 NEUROMUSCULAR REEDUCATION: CPT | Mod: GP

## 2025-01-03 PROCEDURE — 82962 GLUCOSE BLOOD TEST: CPT

## 2025-01-03 PROCEDURE — 92507 TX SP LANG VOICE COMM INDIV: CPT | Mod: GN

## 2025-01-03 PROCEDURE — 92610 EVALUATE SWALLOWING FUNCTION: CPT | Mod: GN

## 2025-01-03 PROCEDURE — 97161 PT EVAL LOW COMPLEX 20 MIN: CPT | Mod: GP

## 2025-01-03 PROCEDURE — 87635 SARS-COV-2 COVID-19 AMP PRB: CPT

## 2025-01-03 PROCEDURE — 92523 SPEECH SOUND LANG COMPREHEN: CPT | Mod: GN

## 2025-01-03 PROCEDURE — 97535 SELF CARE MNGMENT TRAINING: CPT | Mod: GO

## 2025-01-03 PROCEDURE — 93970 EXTREMITY STUDY: CPT

## 2025-01-03 PROCEDURE — 85027 COMPLETE CBC AUTOMATED: CPT

## 2025-01-03 PROCEDURE — 97110 THERAPEUTIC EXERCISES: CPT | Mod: GP

## 2025-01-03 PROCEDURE — 97116 GAIT TRAINING THERAPY: CPT | Mod: GP

## 2025-01-03 PROCEDURE — 97165 OT EVAL LOW COMPLEX 30 MIN: CPT | Mod: GO

## 2025-01-08 ENCOUNTER — APPOINTMENT (OUTPATIENT)
Dept: PHYSICAL MEDICINE AND REHAB | Facility: CLINIC | Age: 70
End: 2025-01-08
Payer: MEDICARE

## 2025-01-08 ENCOUNTER — NON-APPOINTMENT (OUTPATIENT)
Age: 70
End: 2025-01-08

## 2025-01-08 VITALS
TEMPERATURE: 98.3 F | SYSTOLIC BLOOD PRESSURE: 163 MMHG | WEIGHT: 162 LBS | DIASTOLIC BLOOD PRESSURE: 80 MMHG | HEIGHT: 66 IN | OXYGEN SATURATION: 99 % | HEART RATE: 71 BPM | BODY MASS INDEX: 26.03 KG/M2

## 2025-01-08 DIAGNOSIS — R26.9 UNSPECIFIED ABNORMALITIES OF GAIT AND MOBILITY: ICD-10-CM

## 2025-01-08 DIAGNOSIS — D32.9 BENIGN NEOPLASM OF MENINGES, UNSPECIFIED: ICD-10-CM

## 2025-01-08 DIAGNOSIS — G40.209 LOCALIZATION-RELATED (FOCAL) (PARTIAL) SYMPTOMATIC EPILEPSY AND EPILEPTIC SYNDROMES WITH COMPLEX PARTIAL SEIZURES, NOT INTRACTABLE, W/OUT STATUS EPILEPTICUS: ICD-10-CM

## 2025-01-08 DIAGNOSIS — M25.569 PAIN IN UNSPECIFIED KNEE: ICD-10-CM

## 2025-01-08 DIAGNOSIS — R56.9 UNSPECIFIED CONVULSIONS: ICD-10-CM

## 2025-01-08 PROCEDURE — 99215 OFFICE O/P EST HI 40 MIN: CPT

## 2025-01-22 ENCOUNTER — APPOINTMENT (OUTPATIENT)
Dept: NEUROLOGY | Facility: CLINIC | Age: 70
End: 2025-01-22
Payer: MEDICARE

## 2025-01-22 PROCEDURE — 95816 EEG AWAKE AND DROWSY: CPT

## 2025-01-23 ENCOUNTER — NON-APPOINTMENT (OUTPATIENT)
Age: 70
End: 2025-01-23

## 2025-01-23 ENCOUNTER — APPOINTMENT (OUTPATIENT)
Dept: CARDIOLOGY | Facility: CLINIC | Age: 70
End: 2025-01-23
Payer: MEDICARE

## 2025-01-23 VITALS
DIASTOLIC BLOOD PRESSURE: 76 MMHG | SYSTOLIC BLOOD PRESSURE: 135 MMHG | OXYGEN SATURATION: 100 % | HEIGHT: 66 IN | WEIGHT: 162 LBS | HEART RATE: 68 BPM | BODY MASS INDEX: 26.03 KG/M2

## 2025-01-23 DIAGNOSIS — I82.461 RT CALF MUSCULAR VEIN ACUTE EMBOLISM AND THROMBOSIS: ICD-10-CM

## 2025-01-23 PROCEDURE — 95700 EEG CONT REC W/VID EEG TECH: CPT

## 2025-01-23 PROCEDURE — 95719 EEG PHYS/QHP EA INCR W/O VID: CPT

## 2025-01-23 PROCEDURE — 95708 EEG WO VID EA 12-26HR UNMNTR: CPT

## 2025-01-23 PROCEDURE — 99214 OFFICE O/P EST MOD 30 MIN: CPT

## 2025-01-23 PROCEDURE — G2211 COMPLEX E/M VISIT ADD ON: CPT

## 2025-01-23 RX ORDER — APIXABAN 5 MG/1
5 TABLET, FILM COATED ORAL
Qty: 90 | Refills: 3 | Status: ACTIVE | COMMUNITY
Start: 2025-01-23 | End: 1900-01-01

## 2025-01-28 RX ORDER — APIXABAN 5 MG/1
5 TABLET, FILM COATED ORAL
Qty: 60 | Refills: 0 | Status: ACTIVE | COMMUNITY
Start: 2025-01-28 | End: 1900-01-01

## 2025-03-21 ENCOUNTER — APPOINTMENT (OUTPATIENT)
Dept: CARDIOLOGY | Facility: CLINIC | Age: 70
End: 2025-03-21
Payer: MEDICARE

## 2025-03-21 VITALS
HEART RATE: 86 BPM | WEIGHT: 162 LBS | SYSTOLIC BLOOD PRESSURE: 149 MMHG | DIASTOLIC BLOOD PRESSURE: 77 MMHG | OXYGEN SATURATION: 100 % | BODY MASS INDEX: 26.15 KG/M2

## 2025-03-21 VITALS — SYSTOLIC BLOOD PRESSURE: 136 MMHG | DIASTOLIC BLOOD PRESSURE: 74 MMHG

## 2025-03-21 DIAGNOSIS — I82.461 RT CALF MUSCULAR VEIN ACUTE EMBOLISM AND THROMBOSIS: ICD-10-CM

## 2025-03-21 PROCEDURE — G2211 COMPLEX E/M VISIT ADD ON: CPT

## 2025-03-21 PROCEDURE — 99214 OFFICE O/P EST MOD 30 MIN: CPT

## 2025-03-31 ENCOUNTER — APPOINTMENT (OUTPATIENT)
Dept: ULTRASOUND IMAGING | Facility: HOSPITAL | Age: 70
End: 2025-03-31

## 2025-03-31 ENCOUNTER — OUTPATIENT (OUTPATIENT)
Dept: OUTPATIENT SERVICES | Facility: HOSPITAL | Age: 70
LOS: 1 days | End: 2025-03-31
Payer: COMMERCIAL

## 2025-03-31 DIAGNOSIS — I82.461 ACUTE EMBOLISM AND THROMBOSIS OF RIGHT CALF MUSCULAR VEIN: ICD-10-CM

## 2025-03-31 PROCEDURE — 93970 EXTREMITY STUDY: CPT | Mod: 26

## 2025-03-31 PROCEDURE — 93970 EXTREMITY STUDY: CPT

## 2025-04-01 ENCOUNTER — NON-APPOINTMENT (OUTPATIENT)
Age: 70
End: 2025-04-01

## 2025-04-23 ENCOUNTER — NON-APPOINTMENT (OUTPATIENT)
Age: 70
End: 2025-04-23

## 2025-04-23 ENCOUNTER — APPOINTMENT (OUTPATIENT)
Dept: CARDIOLOGY | Facility: CLINIC | Age: 70
End: 2025-04-23
Payer: MEDICARE

## 2025-04-23 VITALS
BODY MASS INDEX: 26.03 KG/M2 | HEIGHT: 66 IN | DIASTOLIC BLOOD PRESSURE: 80 MMHG | SYSTOLIC BLOOD PRESSURE: 145 MMHG | OXYGEN SATURATION: 99 % | HEART RATE: 69 BPM | WEIGHT: 162 LBS

## 2025-04-23 DIAGNOSIS — I82.461 RT CALF MUSCULAR VEIN ACUTE EMBOLISM AND THROMBOSIS: ICD-10-CM

## 2025-04-23 PROCEDURE — 99214 OFFICE O/P EST MOD 30 MIN: CPT

## 2025-04-23 PROCEDURE — G2211 COMPLEX E/M VISIT ADD ON: CPT

## 2025-05-14 ENCOUNTER — APPOINTMENT (OUTPATIENT)
Dept: NEUROSURGERY | Facility: CLINIC | Age: 70
End: 2025-05-14
Payer: MEDICARE

## 2025-05-14 ENCOUNTER — OUTPATIENT (OUTPATIENT)
Dept: OUTPATIENT SERVICES | Facility: HOSPITAL | Age: 70
LOS: 1 days | End: 2025-05-14
Payer: COMMERCIAL

## 2025-05-14 ENCOUNTER — APPOINTMENT (OUTPATIENT)
Dept: MRI IMAGING | Facility: IMAGING CENTER | Age: 70
End: 2025-05-14
Payer: MEDICARE

## 2025-05-14 VITALS
TEMPERATURE: 98.1 F | BODY MASS INDEX: 27.32 KG/M2 | OXYGEN SATURATION: 98 % | WEIGHT: 170 LBS | HEART RATE: 66 BPM | DIASTOLIC BLOOD PRESSURE: 73 MMHG | RESPIRATION RATE: 16 BRPM | HEIGHT: 66 IN | SYSTOLIC BLOOD PRESSURE: 139 MMHG

## 2025-05-14 DIAGNOSIS — D32.9 BENIGN NEOPLASM OF MENINGES, UNSPECIFIED: ICD-10-CM

## 2025-05-14 PROCEDURE — 70553 MRI BRAIN STEM W/O & W/DYE: CPT | Mod: 26

## 2025-05-14 PROCEDURE — 70553 MRI BRAIN STEM W/O & W/DYE: CPT

## 2025-05-14 PROCEDURE — A9585: CPT

## 2025-05-14 PROCEDURE — 99213 OFFICE O/P EST LOW 20 MIN: CPT

## 2025-05-23 ENCOUNTER — APPOINTMENT (OUTPATIENT)
Dept: ULTRASOUND IMAGING | Facility: HOSPITAL | Age: 70
End: 2025-05-23

## 2025-05-23 ENCOUNTER — OUTPATIENT (OUTPATIENT)
Dept: OUTPATIENT SERVICES | Facility: HOSPITAL | Age: 70
LOS: 1 days | End: 2025-05-23
Payer: COMMERCIAL

## 2025-05-23 DIAGNOSIS — I82.461 ACUTE EMBOLISM AND THROMBOSIS OF RIGHT CALF MUSCULAR VEIN: ICD-10-CM

## 2025-05-23 PROCEDURE — 93970 EXTREMITY STUDY: CPT | Mod: 26

## 2025-05-23 PROCEDURE — 93970 EXTREMITY STUDY: CPT

## 2025-05-28 ENCOUNTER — APPOINTMENT (OUTPATIENT)
Dept: NEUROLOGY | Facility: CLINIC | Age: 70
End: 2025-05-28
Payer: MEDICARE

## 2025-05-28 VITALS
HEIGHT: 66 IN | WEIGHT: 170 LBS | BODY MASS INDEX: 27.32 KG/M2 | DIASTOLIC BLOOD PRESSURE: 80 MMHG | HEART RATE: 81 BPM | OXYGEN SATURATION: 98 % | RESPIRATION RATE: 20 BRPM | SYSTOLIC BLOOD PRESSURE: 129 MMHG

## 2025-05-28 DIAGNOSIS — D32.9 BENIGN NEOPLASM OF MENINGES, UNSPECIFIED: ICD-10-CM

## 2025-05-28 DIAGNOSIS — G40.209 LOCALIZATION-RELATED (FOCAL) (PARTIAL) SYMPTOMATIC EPILEPSY AND EPILEPTIC SYNDROMES WITH COMPLEX PARTIAL SEIZURES, NOT INTRACTABLE, W/OUT STATUS EPILEPTICUS: ICD-10-CM

## 2025-05-28 DIAGNOSIS — R56.9 UNSPECIFIED CONVULSIONS: ICD-10-CM

## 2025-05-28 PROCEDURE — G2211 COMPLEX E/M VISIT ADD ON: CPT

## 2025-05-28 PROCEDURE — 99213 OFFICE O/P EST LOW 20 MIN: CPT

## 2025-06-09 ENCOUNTER — OUTPATIENT (OUTPATIENT)
Dept: OUTPATIENT SERVICES | Facility: HOSPITAL | Age: 70
LOS: 1 days | End: 2025-06-09
Payer: COMMERCIAL

## 2025-06-09 ENCOUNTER — APPOINTMENT (OUTPATIENT)
Dept: ULTRASOUND IMAGING | Facility: HOSPITAL | Age: 70
End: 2025-06-09

## 2025-06-09 DIAGNOSIS — I82.461 ACUTE EMBOLISM AND THROMBOSIS OF RIGHT CALF MUSCULAR VEIN: ICD-10-CM

## 2025-06-09 PROCEDURE — 93970 EXTREMITY STUDY: CPT | Mod: 26

## 2025-06-09 PROCEDURE — 93970 EXTREMITY STUDY: CPT

## 2025-06-26 ENCOUNTER — APPOINTMENT (OUTPATIENT)
Dept: CARDIOLOGY | Facility: HOSPITAL | Age: 70
End: 2025-06-26

## 2025-06-26 VITALS — DIASTOLIC BLOOD PRESSURE: 75 MMHG | SYSTOLIC BLOOD PRESSURE: 129 MMHG | HEART RATE: 70 BPM

## 2025-06-26 PROCEDURE — G2211 COMPLEX E/M VISIT ADD ON: CPT

## 2025-06-26 PROCEDURE — 99214 OFFICE O/P EST MOD 30 MIN: CPT

## 2025-07-22 ENCOUNTER — APPOINTMENT (OUTPATIENT)
Dept: NEUROLOGY | Facility: CLINIC | Age: 70
End: 2025-07-22
Payer: MEDICARE

## 2025-07-22 VITALS
HEART RATE: 72 BPM | HEIGHT: 66 IN | DIASTOLIC BLOOD PRESSURE: 81 MMHG | SYSTOLIC BLOOD PRESSURE: 133 MMHG | BODY MASS INDEX: 27.32 KG/M2 | WEIGHT: 170 LBS

## 2025-07-22 DIAGNOSIS — G40.209 LOCALIZATION-RELATED (FOCAL) (PARTIAL) SYMPTOMATIC EPILEPSY AND EPILEPTIC SYNDROMES WITH COMPLEX PARTIAL SEIZURES, NOT INTRACTABLE, W/OUT STATUS EPILEPTICUS: ICD-10-CM

## 2025-07-22 DIAGNOSIS — R56.9 UNSPECIFIED CONVULSIONS: ICD-10-CM

## 2025-07-22 PROCEDURE — 99213 OFFICE O/P EST LOW 20 MIN: CPT

## 2025-07-22 PROCEDURE — G2211 COMPLEX E/M VISIT ADD ON: CPT

## 2025-09-03 ENCOUNTER — APPOINTMENT (OUTPATIENT)
Dept: NEUROLOGY | Facility: CLINIC | Age: 70
End: 2025-09-03
Payer: MEDICARE

## 2025-09-03 VITALS
DIASTOLIC BLOOD PRESSURE: 80 MMHG | BODY MASS INDEX: 27.32 KG/M2 | HEIGHT: 66 IN | HEART RATE: 70 BPM | WEIGHT: 170 LBS | SYSTOLIC BLOOD PRESSURE: 160 MMHG

## 2025-09-03 DIAGNOSIS — D32.9 BENIGN NEOPLASM OF MENINGES, UNSPECIFIED: ICD-10-CM

## 2025-09-03 DIAGNOSIS — G40.209 LOCALIZATION-RELATED (FOCAL) (PARTIAL) SYMPTOMATIC EPILEPSY AND EPILEPTIC SYNDROMES WITH COMPLEX PARTIAL SEIZURES, NOT INTRACTABLE, W/OUT STATUS EPILEPTICUS: ICD-10-CM

## 2025-09-03 DIAGNOSIS — R41.3 OTHER AMNESIA: ICD-10-CM

## 2025-09-03 PROCEDURE — 99214 OFFICE O/P EST MOD 30 MIN: CPT
